# Patient Record
Sex: FEMALE | Race: WHITE | NOT HISPANIC OR LATINO | Employment: UNEMPLOYED | URBAN - METROPOLITAN AREA
[De-identification: names, ages, dates, MRNs, and addresses within clinical notes are randomized per-mention and may not be internally consistent; named-entity substitution may affect disease eponyms.]

---
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
## 2022-01-13 ENCOUNTER — ROUTINE PRENATAL (OUTPATIENT)
Dept: OBSTETRICS AND GYNECOLOGY | Facility: CLINIC | Age: 22
End: 2022-01-13

## 2022-01-13 VITALS — SYSTOLIC BLOOD PRESSURE: 128 MMHG | BODY MASS INDEX: 22.86 KG/M2 | DIASTOLIC BLOOD PRESSURE: 80 MMHG | WEIGHT: 121 LBS

## 2022-01-13 DIAGNOSIS — Z34.80 INTRAUTERINE PREGNANCY IN TEENAGER: ICD-10-CM

## 2022-01-13 DIAGNOSIS — A59.01 TRICHOMONAL VAGINITIS: ICD-10-CM

## 2022-01-13 DIAGNOSIS — Z3A.16 16 WEEKS GESTATION OF PREGNANCY: ICD-10-CM

## 2022-01-13 DIAGNOSIS — N30.00 ACUTE CYSTITIS WITHOUT HEMATURIA: ICD-10-CM

## 2022-01-13 DIAGNOSIS — O99.820 GBS (GROUP B STREPTOCOCCUS CARRIER), +RV CULTURE, CURRENTLY PREGNANT: ICD-10-CM

## 2022-01-13 LAB
GLUCOSE UR STRIP-MCNC: NEGATIVE MG/DL
PROT UR STRIP-MCNC: NEGATIVE MG/DL

## 2022-01-13 PROCEDURE — 99213 OFFICE O/P EST LOW 20 MIN: CPT | Performed by: OBSTETRICS & GYNECOLOGY

## 2022-01-13 RX ORDER — PRENATAL VIT NO.126/IRON/FOLIC 28MG-0.8MG
1 TABLET ORAL DAILY
COMMUNITY
End: 2022-08-19

## 2022-01-13 NOTE — PROGRESS NOTES
OB FOLLOW UP  CC- Here for care of pregnancy        Kristel Fernandez is a 21 y.o.  16w3d patient being seen today for her obstetrical follow up visit. Patient reports no complaints    Her prenatal care is complicated by (and status) :    Patient Active Problem List   Diagnosis   • Pregnancy   • Intrauterine pregnancy in teenager   • Trichomonal vaginitis   • Acute cystitis   • GBS (group B Streptococcus carrier), +RV culture, currently pregnant       Flu Status: Declines  Ultrasound Today: No.    ROS -   Patient Reports : No Problems  Patient Denies: Loss of Fluid, Vaginal Spotting, Vision Changes and Headaches  Fetal Movement : fells flutters  All other systems reviewed and are negative.       The additional following portions of the patient's history were reviewed and updated as appropriate: allergies and current medications.    I have reviewed and agree with the HPI, ROS, and historical information as entered above. Kurt Corea MD    /80   Wt 54.9 kg (121 lb)   LMP 10/24/2021   BMI 22.86 kg/m²       EXAM:     FHT: 155 BPM   Uterine Size: size equals dates  Pelvic Exam: No    Urine glucose/protein: See prenatal flowsheet       Assessment and Plan    Problem List Items Addressed This Visit            Pregnancy    Overview     Dates by 9 1/7 wk u/s  Cell free DNA screen low risk; consistent with female fetus.         Relevant Orders    POC Urinalysis Dipstick (Completed)    Alpha Fetoprotein, Maternal    US Ob 14 + Weeks Single or First Gestation    Intrauterine pregnancy in teenager    Overview     22 yo.          Relevant Orders    US Ob 14 + Weeks Single or First Gestation    Trichomonal vaginitis    Overview     At prenatal visit.   Rx Flagy; treat partner.          Acute cystitis    Overview     GBS on prenatal Urine culture.   Rx amoxicillin.     Went to urgent treatment center and treated with Macrobid and 10 on 2021.  Can discontinue Macrodantin         GBS (group B  Streptococcus carrier), +RV culture, currently pregnant    Overview     Prenatal urine culture positive;   Treat in labor.                1. Pregnancy at 16w3d; AFP screen today.   2. Anom scan next visit.   3. Fetal status reassuring.   4. Activity and Exercise discussed.  Return in about 4 weeks (around 2/10/2022) for Anomaly scan ultrasound.    Kurt Corea MD  01/13/2022

## 2022-01-15 LAB
AFP ADJ MOM SERPL: 1.14
AFP INTERP SERPL-IMP: NORMAL
AFP INTERP SERPL-IMP: NORMAL
AFP SERPL-MCNC: 51.4 NG/ML
AGE AT DELIVERY: 20.1 YR
GA METHOD: NORMAL
GA: 16.4 WEEKS
IDDM PATIENT QL: NO
LABORATORY COMMENT REPORT: NORMAL
MULTIPLE PREGNANCY: NO
NEURAL TUBE DEFECT RISK FETUS: NORMAL %
RESULT: NORMAL

## 2022-01-31 ENCOUNTER — TELEPHONE (OUTPATIENT)
Dept: LABOR AND DELIVERY | Facility: HOSPITAL | Age: 22
End: 2022-01-31

## 2022-01-31 NOTE — TELEPHONE ENCOUNTER
1st attempt to contact Kristel to introduce [employer] program.  Unable to leave message.

## 2022-02-02 ENCOUNTER — TELEPHONE (OUTPATIENT)
Dept: LABOR AND DELIVERY | Facility: HOSPITAL | Age: 22
End: 2022-02-02

## 2022-02-02 NOTE — TELEPHONE ENCOUNTER
"2nd attempt to contact Kristel to introduce [employer] program.  Number is \"not reachable\", unable to leave message.  "

## 2022-02-10 ENCOUNTER — ROUTINE PRENATAL (OUTPATIENT)
Dept: OBSTETRICS AND GYNECOLOGY | Facility: CLINIC | Age: 22
End: 2022-02-10

## 2022-02-10 VITALS — DIASTOLIC BLOOD PRESSURE: 74 MMHG | WEIGHT: 124.6 LBS | SYSTOLIC BLOOD PRESSURE: 131 MMHG | BODY MASS INDEX: 23.54 KG/M2

## 2022-02-10 DIAGNOSIS — Z3A.20 20 WEEKS GESTATION OF PREGNANCY: Primary | ICD-10-CM

## 2022-02-10 DIAGNOSIS — Z3A.16 16 WEEKS GESTATION OF PREGNANCY: ICD-10-CM

## 2022-02-10 DIAGNOSIS — O35.EXX0 FETAL RENAL ANOMALY, SINGLE GESTATION: ICD-10-CM

## 2022-02-10 DIAGNOSIS — Z34.80 INTRAUTERINE PREGNANCY IN TEENAGER: ICD-10-CM

## 2022-02-10 DIAGNOSIS — N30.00 ACUTE CYSTITIS WITHOUT HEMATURIA: ICD-10-CM

## 2022-02-10 DIAGNOSIS — A59.01 TRICHOMONAL VAGINITIS: ICD-10-CM

## 2022-02-10 DIAGNOSIS — O99.820 GBS (GROUP B STREPTOCOCCUS CARRIER), +RV CULTURE, CURRENTLY PREGNANT: ICD-10-CM

## 2022-02-10 DIAGNOSIS — R10.30 LOWER ABDOMINAL PAIN: ICD-10-CM

## 2022-02-10 LAB
EXPIRATION DATE: 1
GLUCOSE UR STRIP-MCNC: NEGATIVE MG/DL
Lab: 1
PROT UR STRIP-MCNC: NEGATIVE MG/DL

## 2022-02-10 PROCEDURE — 99214 OFFICE O/P EST MOD 30 MIN: CPT | Performed by: OBSTETRICS & GYNECOLOGY

## 2022-02-10 NOTE — PROGRESS NOTES
OB FOLLOW UP  CC- Here for care of pregnancy        Kristel Fernandez is a 21 y.o.  20w3d patient being seen today for her obstetrical follow up visit. Patient reports low pelvic pain radiating into her lower back for 3 days. She states that in this 3 day period, she didn't feel baby move much, but she started feeling small movements today. Patient reports that she went to  ER today due to pain and they told her she had a yeast infection and prescribed her some Diflucan.     Her prenatal care is complicated by (and status) :    Patient Active Problem List   Diagnosis   • Pregnancy   • Intrauterine pregnancy in teenager   • Trichomonal vaginitis   • Acute cystitis   • GBS (group B Streptococcus carrier), +RV culture, currently pregnant   • Lower abdominal pain   • Fetal renal anomaly, single gestation       Flu Status: Declines  Ultrasound Today: Yes.  Findings showed viable IUP; anomaly scan with multicystic right kidney. .  I have personally evaluated the U/S and agree with the findings.     ROS -   Patient Reports : Headaches  Patient Denies: Loss of Fluid, Vaginal Spotting, Vision Changes, Nausea , Vomiting , Contractions and Epigastric pain  Fetal Movement : normal  All other systems reviewed and are negative.       The additional following portions of the patient's history were reviewed and updated as appropriate: allergies and current medications.    I have reviewed and agree with the HPI, ROS, and historical information as entered above. Kurt Corea MD    /74   Wt 56.5 kg (124 lb 9.6 oz)   LMP 10/24/2021   BMI 23.54 kg/m²       EXAM:     Abdomen: soft, non-tender; no masses  no umbilical or inguinal hernias are present  no hepato-splenomegaly  fundus firm and non-tender  bilateral lower abdominal tenderness without guarding or rebound.      FHT: 157 BPM   Uterine Size: size equals dates  Pelvic Exam: No    Urine glucose/protein: See prenatal flowsheet       Assessment and Plan    Problem  List Items Addressed This Visit            Pregnancy - Primary    Overview     Dates by 9 1/7 wk u/s  Cell free DNA screen low risk; consistent with female fetus.  aFP screen was negative.         Relevant Orders    POC Glucose, Urine, Qualitative, Dipstick    POC Protein, Urine, Qualitative, Dipstick    Intrauterine pregnancy in teenager    Overview     22 yo.          Trichomonal vaginitis    Overview     At prenatal visit.   Rx Flagy; treat partner.          Acute cystitis    Overview     GBS on prenatal Urine culture.   Rx amoxicillin.     Went to urgent treatment center and treated with Macrobid and 10 on 2021.  Can discontinue Macrodantin         GBS (group B Streptococcus carrier), +RV culture, currently pregnant    Overview     Prenatal urine culture positive;   Treat in labor.          Lower abdominal pain    Overview     2/10/22; Pt c/o diffuse lower abdominal pain and tenderness. No fever chills; Has had prior appendectomy. Cx length normal; Had negative CCu/a at [facility] this am.     Probable round ligament pain.   Try heat and Tylenol.          Fetal renal anomaly, single gestation    Overview     Anomaly scan 2/10/22; multicystic right kidney;     PDC consultation ordered.          Relevant Orders    [facility]          1. Pregnancy at 20w3d;  2. Anomaly scan with multicystic right fetal kidney; PDC evaluation scheduled.   3. Lower abdominal pain; went to [facility] ER today. Told had vaginal yeast infection.   4. Fetal status reassuring.   5. Activity and Exercise discussed.  Return in about 4 weeks (around 3/10/2022).    Kurt Corea MD  02/10/2022

## 2022-02-28 ENCOUNTER — APPOINTMENT (OUTPATIENT)
Dept: WOMENS IMAGING | Facility: HOSPITAL | Age: 22
End: 2022-02-28

## 2022-03-04 ENCOUNTER — OFFICE VISIT (OUTPATIENT)
Dept: OBSTETRICS AND GYNECOLOGY | Facility: HOSPITAL | Age: 22
End: 2022-03-04

## 2022-03-04 ENCOUNTER — HOSPITAL ENCOUNTER (OUTPATIENT)
Dept: WOMENS IMAGING | Facility: HOSPITAL | Age: 22
Discharge: HOME | End: 2022-03-04
Admitting: OBSTETRICS & GYNECOLOGY

## 2022-03-04 VITALS
HEIGHT: 59 IN | WEIGHT: 129 LBS | BODY MASS INDEX: 26 KG/M2 | DIASTOLIC BLOOD PRESSURE: 81 MMHG | SYSTOLIC BLOOD PRESSURE: 129 MMHG

## 2022-03-04 DIAGNOSIS — O35.EXX0 FETAL RENAL ANOMALY, SINGLE GESTATION: Primary | ICD-10-CM

## 2022-03-04 DIAGNOSIS — O35.EXX0 FETAL RENAL ANOMALY, SINGLE GESTATION: ICD-10-CM

## 2022-03-04 DIAGNOSIS — Z34.90 PREGNANCY, UNSPECIFIED GESTATIONAL AGE: ICD-10-CM

## 2022-03-04 PROCEDURE — 99213 OFFICE O/P EST LOW 20 MIN: CPT | Performed by: OBSTETRICS & GYNECOLOGY

## 2022-03-04 PROCEDURE — 76811 OB US DETAILED SNGL FETUS: CPT

## 2022-03-04 PROCEDURE — 76811 OB US DETAILED SNGL FETUS: CPT | Performed by: OBSTETRICS & GYNECOLOGY

## 2022-03-04 RX ORDER — POLYETHYLENE GLYCOL 3350 17 G/17G
POWDER, FOR SOLUTION ORAL TAKE AS DIRECTED
COMMUNITY
Start: 2022-02-11 | End: 2022-03-17

## 2022-03-04 RX ORDER — CYCLOBENZAPRINE HCL 5 MG
TABLET ORAL TAKE AS DIRECTED
COMMUNITY
Start: 2022-02-11 | End: 2022-03-17

## 2022-03-04 RX ORDER — DOCUSATE SODIUM 100 MG/1
CAPSULE, LIQUID FILLED ORAL TAKE AS DIRECTED
COMMUNITY
Start: 2022-02-11 | End: 2022-03-17

## 2022-03-04 RX ORDER — BISACODYL 10 MG
SUPPOSITORY, RECTAL RECTAL TAKE AS DIRECTED
COMMUNITY
Start: 2022-02-11 | End: 2022-03-17

## 2022-03-17 ENCOUNTER — ROUTINE PRENATAL (OUTPATIENT)
Dept: OBSTETRICS AND GYNECOLOGY | Facility: CLINIC | Age: 22
End: 2022-03-17

## 2022-03-17 VITALS — SYSTOLIC BLOOD PRESSURE: 102 MMHG | DIASTOLIC BLOOD PRESSURE: 70 MMHG | BODY MASS INDEX: 28.07 KG/M2 | WEIGHT: 139 LBS

## 2022-03-17 DIAGNOSIS — N30.00 ACUTE CYSTITIS WITHOUT HEMATURIA: ICD-10-CM

## 2022-03-17 DIAGNOSIS — O35.EXX0 FETAL RENAL ANOMALY, SINGLE GESTATION: ICD-10-CM

## 2022-03-17 DIAGNOSIS — A59.01 TRICHOMONAL VAGINITIS: ICD-10-CM

## 2022-03-17 DIAGNOSIS — R10.30 LOWER ABDOMINAL PAIN: ICD-10-CM

## 2022-03-17 DIAGNOSIS — Z34.80 INTRAUTERINE PREGNANCY IN TEENAGER: ICD-10-CM

## 2022-03-17 DIAGNOSIS — O99.820 GBS (GROUP B STREPTOCOCCUS CARRIER), +RV CULTURE, CURRENTLY PREGNANT: ICD-10-CM

## 2022-03-17 DIAGNOSIS — Z3A.25 25 WEEKS GESTATION OF PREGNANCY: Primary | ICD-10-CM

## 2022-03-17 LAB
GLUCOSE UR STRIP-MCNC: NEGATIVE MG/DL
PROT UR STRIP-MCNC: ABNORMAL MG/DL

## 2022-03-17 PROCEDURE — 99213 OFFICE O/P EST LOW 20 MIN: CPT | Performed by: OBSTETRICS & GYNECOLOGY

## 2022-03-17 NOTE — PROGRESS NOTES
OB FOLLOW UP  CC- Here for care of pregnancy        Kristel Fernandez is a 21 y.o.  25w3d patient being seen today for her obstetrical follow up visit. Patient reports no complaints.     Her prenatal care is complicated by (and status) :    Patient Active Problem List   Diagnosis   • Pregnancy   • Intrauterine pregnancy in teenager   • Trichomonal vaginitis   • Acute cystitis   • GBS (group B Streptococcus carrier), +RV culture, currently pregnant   • Lower abdominal pain   • Fetal renal anomaly, single gestation       Flu Status: Declines  Ultrasound Today: No.    ROS -   Patient Reports : No Problems  Patient Denies: Loss of Fluid, Vaginal Spotting, Vision Changes and Headaches  Fetal Movement : normal  All other systems reviewed and are negative.       The additional following portions of the patient's history were reviewed and updated as appropriate: allergies and current medications.    I have reviewed and agree with the HPI, ROS, and historical information as entered above. Kurt Corea MD    /70   Wt 63 kg (139 lb)   LMP 10/24/2021   BMI 28.07 kg/m²       EXAM:     FHT: 163 BPM   Uterine Size: size equals dates  Pelvic Exam: No    Urine glucose/protein: See prenatal flowsheet       Assessment and Plan    Problem List Items Addressed This Visit            Pregnancy - Primary    Overview     Dates by 9 1/7 wk u/s  Cell free DNA screen low risk; consistent with female fetus.  aFP screen was negative.           Relevant Orders    POC Urinalysis Dipstick (Completed)    Intrauterine pregnancy in teenager    Overview     20 yo.            Trichomonal vaginitis    Overview     At prenatal visit.   Rx Flagy; treat partner.            Acute cystitis    Overview     GBS on prenatal Urine culture.   Rx amoxicillin.     Went to urgent treatment center and treated with Macrobid and 10 on 2021.  Can discontinue Macrodantin           GBS (group B Streptococcus carrier), +RV culture, currently  pregnant    Overview     Prenatal urine culture positive;   Treat in labor.            Lower abdominal pain    Overview     2/10/22; Pt c/o diffuse lower abdominal pain and tenderness. No fever chills; Has had prior appendectomy. Cx length normal; Had negative CCu/a at  [facility] this am.     Probable round ligament pain.   Try heat and Tylenol.            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 Pregnancy at 25w3d; glucose screen next.   2. 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   3. Fetal status reassuring.   4. Activity and Exercise discussed.  Return in about 4 weeks (around 4/14/2022) for Next scheduled follow up, One hour Glucose Screen.    Kurt Corea MD  03/17/2022

## 2022-04-07 ENCOUNTER — ROUTINE PRENATAL (OUTPATIENT)
Dept: OBSTETRICS AND GYNECOLOGY | Facility: CLINIC | Age: 22
End: 2022-04-07

## 2022-04-07 VITALS — WEIGHT: 139 LBS | DIASTOLIC BLOOD PRESSURE: 78 MMHG | SYSTOLIC BLOOD PRESSURE: 100 MMHG | BODY MASS INDEX: 28.07 KG/M2

## 2022-04-07 DIAGNOSIS — R10.30 LOWER ABDOMINAL PAIN: ICD-10-CM

## 2022-04-07 DIAGNOSIS — Z3A.28 28 WEEKS GESTATION OF PREGNANCY: Primary | ICD-10-CM

## 2022-04-07 DIAGNOSIS — A59.01 TRICHOMONAL VAGINITIS: ICD-10-CM

## 2022-04-07 DIAGNOSIS — O99.820 GBS (GROUP B STREPTOCOCCUS CARRIER), +RV CULTURE, CURRENTLY PREGNANT: ICD-10-CM

## 2022-04-07 DIAGNOSIS — Z34.80 INTRAUTERINE PREGNANCY IN TEENAGER: ICD-10-CM

## 2022-04-07 DIAGNOSIS — N30.00 ACUTE CYSTITIS WITHOUT HEMATURIA: ICD-10-CM

## 2022-04-07 DIAGNOSIS — Z23 IMMUNIZATION DUE: ICD-10-CM

## 2022-04-07 DIAGNOSIS — O35.EXX0 FETAL RENAL ANOMALY, SINGLE GESTATION: ICD-10-CM

## 2022-04-07 LAB
GLUCOSE UR STRIP-MCNC: NEGATIVE MG/DL
PROT UR STRIP-MCNC: ABNORMAL MG/DL

## 2022-04-07 PROCEDURE — 99213 OFFICE O/P EST LOW 20 MIN: CPT | Performed by: OBSTETRICS & GYNECOLOGY

## 2022-04-07 NOTE — PROGRESS NOTES
OB FOLLOW UP  CC- Here for care of pregnancy        Kristel Fernandez is a 21 y.o.  28w3d patient being seen today for her obstetrical follow up. Patient reports round ligament pain when getting up patient denies headaches andblurred vision pt states sometimes gets nauseated  with no vomiting.     Patient undergoing Glucola testing today. She is due for her testing at 10:05.     MBT: O+  Rhogam Given: N/A  TDAP: given today  Breast Pump: prescription given  Flu Status: Declines  Ultrasound Today: No.    Her prenatal care is complicated by (and status) :    Patient Active Problem List   Diagnosis   • Pregnancy   • Intrauterine pregnancy in teenager   • Trichomonal vaginitis   • Acute cystitis   • GBS (group B Streptococcus carrier), +RV culture, currently pregnant   • Lower abdominal pain   • Fetal renal anomaly, single gestation         ROS -   Patient Reports : round ligament pain  Patient Denies: Loss of Fluid, Vaginal Spotting, Vision Changes, Headaches, Vomiting  and Contractions  Fetal Movement : normal    The additional following portions of the patient's history were reviewed and updated as appropriate: allergies and current medications.    I have reviewed and agree with the HPI, ROS, and historical information as entered above. Kurt Corea MD    /78   Wt 63 kg (139 lb)   LMP 10/24/2021   BMI 28.07 kg/m²         EXAM:     FHT: 153 BPM   Uterine Size: 27 cm  Pelvic Exam: No       Assessment and Plan    Problem List Items Addressed This Visit            Pregnancy - Primary    Overview     Dates by 9 1/7 wk u/s  Cell free DNA screen low risk; consistent with female fetus.  aFP screen was negative.           Relevant Orders    POC Urinalysis Dipstick (Completed)    Antibody Screen    CBC (No Diff)    Gestational Screen 1 Hr ([facility])    Intrauterine pregnancy in teenager    Overview     22 yo.            Relevant Orders    Antibody Screen    CBC (No Diff)    Gestational Screen 1 Hr  ([facility])    Trichomonal vaginitis    Overview     At prenatal visit.   Rx Flagy; treat partner.            Relevant Orders    Antibody Screen    CBC (No Diff)    Gestational Screen 1 Hr ([facility])    Acute cystitis    Overview     GBS on prenatal Urine culture.   Rx amoxicillin.     Went to urgent treatment center and treated with Macrobid and 10 on 12/6/2021.  Can discontinue Macrodantin           Relevant Orders    Antibody Screen    CBC (No Diff)    Gestational Screen 1 Hr ([facility])    GBS (group B Streptococcus carrier), +RV culture, currently pregnant    Overview     Prenatal urine culture positive;   Treat in labor.            Relevant Orders    Antibody Screen    CBC (No Diff)    Gestational Screen 1 Hr ([facility])    Lower abdominal pain    Overview     2/10/22; Pt c/o diffuse lower abdominal pain and tenderness. No fever chills; Has had prior appendectomy. Cx length normal; Had negative [facility]a [facility] this [facility].     Probable round ligament pain.   Try heat and Tylenol.            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 Pregnancy at 28w3d; one hour glucose screen today.   2. H/o dysplastic fetal kidney; Has PDC f/u us/ on 4/28/22.  3. Fetal status reassuring.   4. Activity and Exercise discussed.  Return in about 4 weeks (around 5/5/2022) for Next scheduled follow up.    Kurt Corea MD  04/07/2022

## 2022-04-08 LAB
BLD GP AB SCN SERPL QL: NEGATIVE
ERYTHROCYTE [DISTWIDTH] IN BLOOD BY AUTOMATED COUNT: 12.3 % (ref 12.3–15.4)
GLUCOSE 1H P 50 G GLC PO SERPL-MCNC: 112 MG/DL (ref 65–139)
HCT VFR BLD AUTO: 35.5 % (ref 34–46.6)
HGB BLD-MCNC: 12.2 G/DL (ref 12–15.9)
MCH RBC QN AUTO: 29.8 PG (ref 26.6–33)
MCHC RBC AUTO-ENTMCNC: 34.4 G/DL (ref 31.5–35.7)
MCV RBC AUTO: 86.6 FL (ref 79–97)
PLATELET # BLD AUTO: 200 10*3/MM3 (ref 140–450)
RBC # BLD AUTO: 4.1 10*6/MM3 (ref 3.77–5.28)
WBC # BLD AUTO: 7.62 10*3/MM3 (ref 3.4–10.8)

## 2022-04-11 ENCOUNTER — TELEPHONE (OUTPATIENT)
Dept: OBSTETRICS AND GYNECOLOGY | Facility: CLINIC | Age: 22
End: 2022-04-11

## 2022-04-11 NOTE — TELEPHONE ENCOUNTER
Pt called in stating that she is 29w0d and that her boyfriend called her and said that he has trichomonas. Pt instructed to come in to the office and leave a urine sample so that it can be sent out for STD testing. Pt stated understanding.

## 2022-04-14 ENCOUNTER — ROUTINE PRENATAL (OUTPATIENT)
Dept: OBSTETRICS AND GYNECOLOGY | Facility: CLINIC | Age: 22
End: 2022-04-14

## 2022-04-14 VITALS — WEIGHT: 138 LBS | DIASTOLIC BLOOD PRESSURE: 70 MMHG | SYSTOLIC BLOOD PRESSURE: 128 MMHG | BODY MASS INDEX: 27.87 KG/M2

## 2022-04-14 DIAGNOSIS — Z11.3 SCREENING EXAMINATION FOR STD (SEXUALLY TRANSMITTED DISEASE): ICD-10-CM

## 2022-04-14 DIAGNOSIS — O35.EXX0 FETAL RENAL ANOMALY, SINGLE GESTATION: ICD-10-CM

## 2022-04-14 DIAGNOSIS — O36.8130 DECREASED FETAL MOVEMENTS IN THIRD TRIMESTER, SINGLE OR UNSPECIFIED FETUS: ICD-10-CM

## 2022-04-14 DIAGNOSIS — O99.820 GBS (GROUP B STREPTOCOCCUS CARRIER), +RV CULTURE, CURRENTLY PREGNANT: ICD-10-CM

## 2022-04-14 DIAGNOSIS — Z34.80 INTRAUTERINE PREGNANCY IN TEENAGER: ICD-10-CM

## 2022-04-14 DIAGNOSIS — A59.01 TRICHOMONAL VAGINITIS: ICD-10-CM

## 2022-04-14 DIAGNOSIS — N89.8 VAGINAL DISCHARGE: ICD-10-CM

## 2022-04-14 DIAGNOSIS — O36.8120 DECREASED FETAL MOVEMENTS IN SECOND TRIMESTER, SINGLE OR UNSPECIFIED FETUS: ICD-10-CM

## 2022-04-14 DIAGNOSIS — Z3A.29 29 WEEKS GESTATION OF PREGNANCY: Primary | ICD-10-CM

## 2022-04-14 PROBLEM — K21.9 GERD WITHOUT ESOPHAGITIS: Status: ACTIVE | Noted: 2022-04-14

## 2022-04-14 LAB
EXPIRATION DATE: 0
GLUCOSE UR STRIP-MCNC: NEGATIVE MG/DL
Lab: 0
PROT UR STRIP-MCNC: ABNORMAL MG/DL
WET PREP GENITAL: ABNORMAL

## 2022-04-14 PROCEDURE — 87210 SMEAR WET MOUNT SALINE/INK: CPT | Performed by: OBSTETRICS & GYNECOLOGY

## 2022-04-14 PROCEDURE — 99213 OFFICE O/P EST LOW 20 MIN: CPT | Performed by: OBSTETRICS & GYNECOLOGY

## 2022-04-14 RX ORDER — METRONIDAZOLE 500 MG/1
500 TABLET ORAL 2 TIMES DAILY
Qty: 14 TABLET | Refills: 0 | Status: SHIPPED | OUTPATIENT
Start: 2022-04-14 | End: 2022-04-21

## 2022-04-14 RX ORDER — FAMOTIDINE 20 MG/1
20 TABLET, FILM COATED ORAL 2 TIMES DAILY
Qty: 60 TABLET | Refills: 6 | Status: SHIPPED | OUTPATIENT
Start: 2022-04-14 | End: 2022-06-23 | Stop reason: HOSPADM

## 2022-04-14 RX ORDER — BREAST PUMP
EACH MISCELLANEOUS
Qty: 1 EACH | Refills: 0 | Status: SHIPPED | OUTPATIENT
Start: 2022-04-14

## 2022-04-14 NOTE — PROGRESS NOTES
OB FOLLOW UP  CC- Here for care of pregnancy        Kristel Fernandez is a 21 y.o.  29w3d patient being seen today for her obstetrical follow up visit. Patient reports decreased fetal movement. Patient reports that she was told by her boyfriend this past Monday that he has trichomonas and she would like to be tested.     NST today for decreased fetal movement.     Her prenatal care is complicated by (and status) :    Patient Active Problem List   Diagnosis   • Pregnancy   • Intrauterine pregnancy in teenager   • Trichomonal vaginitis   • Acute cystitis   • GBS (group B Streptococcus carrier), +RV culture, currently pregnant   • Lower abdominal pain   • Fetal renal anomaly, single gestation   • Decreased fetal movements in third trimester   • Vaginal discharge   • GERD without esophagitis       Flu Status: Declines  Ultrasound Today: No.    ROS -   Patient Reports : Cramping  Patient Denies: Loss of Fluid, Vaginal Spotting, Vision Changes, Headaches, Nausea , Vomiting , Contractions and Epigastric pain  Fetal Movement : decreased  All other systems reviewed and are negative.       The additional following portions of the patient's history were reviewed and updated as appropriate: allergies and current medications.    I have reviewed and agree with the HPI, ROS, and historical information as entered above. Kurt Corea MD    /70   Wt 62.6 kg (138 lb)   LMP 10/24/2021   BMI 27.87 kg/m²       EXAM:     FHT: 150 BPM   Uterine Size: size equals dates  Pelvic Exam: Heavy yellow discharge.    Urine glucose/protein: See prenatal flowsheet       Assessment and Plan    Problem List Items Addressed This Visit            Pregnancy - Primary    Overview     Dates by 9 1/7 wk u/s  Cell free DNA screen low risk; consistent with female fetus.  aFP screen was negative.           Relevant Orders    POC Glucose, Urine, Qualitative, Dipstick (Completed)    POC Protein, Urine, Qualitative, Dipstick (Completed)     US Ob Follow Up Transabdominal Approach    US Fetal Biophysical Profile;With Non-Stress Testing    Intrauterine pregnancy in teenager    Overview     20 yo.            Trichomonal vaginitis    Overview     At prenatal visit.   Rx Flagy; treat partner.            GBS (group B Streptococcus carrier), +RV culture, currently pregnant    Overview     Prenatal urine culture positive;   Treat in labor.            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            Relevant Orders    POC Wet Prep (Completed)    Chlamydia trachomatis, Neisseria gonorrhoeae, Trichomonas vaginalis, PCR - Swab, Cervix      Other Visit Diagnoses     Decreased fetal movements in second trimester, single or unspecified fetus        Relevant Orders    Fetal Nonstress Test    US Ob Follow Up Transabdominal Approach    US Fetal Biophysical Profile;With Non-Stress Testing    Screening examination for STD (sexually transmitted disease)        Relevant Orders    Chlamydia trachomatis, Neisseria gonorrhoeae, Trichomonas vaginalis, PCR - Swab, Cervix          1. Pregnancy at 29w3d;  2. Complains of decreased fetal movement.  NST was reactive active fetal movement heard.  Biophysical profile 8 out of 8.   3. Vaginal discharge.  Boyfriend treated for trichomonas.  Wet mount positive for BV.  Rx Flagyl 500 mg twice daily for 7 days.  New swab sent.  4. Massive heartburn.  Rx Pepcid sent to pharmacy.  5. Fetal status reassuring.  Estimate fetal weight 75 percentile.  6. Activity and Exercise discussed.  Return in about 2 weeks (around 4/28/2022).    Kurt Corea MD  04/14/2022

## 2022-04-16 LAB
C TRACH RRNA SPEC QL NAA+PROBE: NEGATIVE
N GONORRHOEA RRNA SPEC QL NAA+PROBE: NEGATIVE
T VAGINALIS RRNA SPEC QL NAA+PROBE: NEGATIVE

## 2022-04-28 ENCOUNTER — HOSPITAL ENCOUNTER (OUTPATIENT)
Dept: WOMENS IMAGING | Facility: HOSPITAL | Age: 22
Discharge: HOME | End: 2022-04-28
Admitting: OBSTETRICS & GYNECOLOGY

## 2022-04-28 ENCOUNTER — OFFICE VISIT (OUTPATIENT)
Dept: OBSTETRICS AND GYNECOLOGY | Facility: HOSPITAL | Age: 22
End: 2022-04-28

## 2022-04-28 VITALS — WEIGHT: 138.8 LBS | DIASTOLIC BLOOD PRESSURE: 64 MMHG | SYSTOLIC BLOOD PRESSURE: 117 MMHG | BODY MASS INDEX: 28.03 KG/M2

## 2022-04-28 DIAGNOSIS — Z34.90 PREGNANCY, UNSPECIFIED GESTATIONAL AGE: ICD-10-CM

## 2022-04-28 DIAGNOSIS — O35.EXX0 FETAL RENAL ANOMALY, SINGLE GESTATION: Primary | ICD-10-CM

## 2022-04-28 DIAGNOSIS — O35.EXX0 FETAL RENAL ANOMALY, SINGLE GESTATION: ICD-10-CM

## 2022-04-28 PROCEDURE — 76816 OB US FOLLOW-UP PER FETUS: CPT | Performed by: OBSTETRICS & GYNECOLOGY

## 2022-04-28 PROCEDURE — 76816 OB US FOLLOW-UP PER FETUS: CPT

## 2022-04-28 NOTE — PROGRESS NOTES
Patient seen in Maternal Fetal Medicine clinic today. Please see full note in under imaging tab of patient chart in Epic (Viewpoint report).    Antonina Moss MD

## 2022-05-12 ENCOUNTER — ROUTINE PRENATAL (OUTPATIENT)
Dept: OBSTETRICS AND GYNECOLOGY | Facility: CLINIC | Age: 22
End: 2022-05-12

## 2022-05-12 VITALS — BODY MASS INDEX: 28.92 KG/M2 | SYSTOLIC BLOOD PRESSURE: 118 MMHG | WEIGHT: 143.2 LBS | DIASTOLIC BLOOD PRESSURE: 64 MMHG

## 2022-05-12 DIAGNOSIS — Z34.80 INTRAUTERINE PREGNANCY IN TEENAGER: ICD-10-CM

## 2022-05-12 DIAGNOSIS — Z34.90 PREGNANCY, UNSPECIFIED GESTATIONAL AGE: ICD-10-CM

## 2022-05-12 DIAGNOSIS — O35.EXX0 FETAL RENAL ANOMALY, SINGLE GESTATION: Primary | ICD-10-CM

## 2022-05-12 DIAGNOSIS — O99.820 GBS (GROUP B STREPTOCOCCUS CARRIER), +RV CULTURE, CURRENTLY PREGNANT: ICD-10-CM

## 2022-05-12 DIAGNOSIS — K21.9 GERD WITHOUT ESOPHAGITIS: ICD-10-CM

## 2022-05-12 PROCEDURE — 99213 OFFICE O/P EST LOW 20 MIN: CPT | Performed by: OBSTETRICS & GYNECOLOGY

## 2022-05-12 NOTE — PROGRESS NOTES
OB FOLLOW UP  CC- Here for care of pregnancy        Kristel Fernandez is a 21 y.o.  33w3d patient being seen today for her obstetrical follow up visit. Patient reports no complaints. Pt states that she needs a refill on her flagyl because she lost her medicine.    Her prenatal care is complicated by (and status) :    Patient Active Problem List   Diagnosis   • Pregnancy   • Intrauterine pregnancy in teenager   • Trichomonal vaginitis   • Acute cystitis   • GBS (group B Streptococcus carrier), +RV culture, currently pregnant   • Lower abdominal pain   • Fetal renal anomaly, single gestation   • Decreased fetal movements in third trimester   • Vaginal discharge   • GERD without esophagitis         Ultrasound Today: No.    ROS -   Patient Reports : No Problems  Patient Denies: Loss of Fluid, Vaginal Spotting, Vision Changes, Headaches, Nausea , Vomiting , Contractions and Epigastric pain  Fetal Movement : normal  All other systems reviewed and are negative.       The additional following portions of the patient's history were reviewed and updated as appropriate: allergies and current medications.    I have reviewed and agree with the HPI, ROS, and historical information as entered above. Kurt Corea MD    /64   Wt 65 kg (143 lb 3.2 oz)   LMP 10/24/2021   BMI 28.92 kg/m²       EXAM:     FHT: 155 BPM   Uterine Size: 32 cm  Pelvic Exam: No    Urine glucose/protein: See prenatal flowsheet       Assessment and Plan    Problem List Items Addressed This Visit            Pregnancy    Overview     Dates by 9 1/7 wk u/s  Cell free DNA screen low risk; consistent with female fetus.  aFP screen was negative.  Unilateral Multicystic dysplastic kidney.            Intrauterine pregnancy in teenager    Overview     22 yo.            Relevant Orders    US Ob Follow Up Transabdominal Approach    US Fetal Biophysical Profile;Without Non-Stress Testing    GBS (group B Streptococcus carrier), +RV culture,  currently pregnant    Overview     Prenatal urine culture positive;   Treat in labor.            Fetal renal anomaly, single gestation - Primary    Overview     Anomaly scan 2/10/22; multicystic right kidney;     PDC u/s c/w right sided unilateral multicystic dysplastic kidney> f/u 4/28 PDC;    PDC 4/28/22;           Relevant Orders    US Ob Follow Up Transabdominal Approach    US Fetal Biophysical Profile;Without Non-Stress Testing    GERD without esophagitis    Overview     Rx Pepcid 20 mg po bid.            Relevant Medications    famotidine (Pepcid) 20 MG tablet          1. Pregnancy at 33w3d; no problems today.  2. Right-sided unilateral dysplastic kidney.  Ultrasound 4/28/2022 at [facility] showed normal fetal growth at 64 percentile and stable fetal dysplastic kidney measuring 7 x 5 cm.  3. Fetal status reassuring.  Agus ultrasound for growth in 2 weeks.  4. Activity and Exercise discussed.  Return in about 2 weeks (around 5/26/2022) for Follow-up ultrasound, Next scheduled follow up.    Kurt Corea MD  05/12/2022

## 2022-05-26 ENCOUNTER — ROUTINE PRENATAL (OUTPATIENT)
Dept: OBSTETRICS AND GYNECOLOGY | Facility: CLINIC | Age: 22
End: 2022-05-26

## 2022-05-26 VITALS — DIASTOLIC BLOOD PRESSURE: 80 MMHG | WEIGHT: 148 LBS | SYSTOLIC BLOOD PRESSURE: 110 MMHG | BODY MASS INDEX: 29.89 KG/M2

## 2022-05-26 DIAGNOSIS — Z3A.35 35 WEEKS GESTATION OF PREGNANCY: ICD-10-CM

## 2022-05-26 DIAGNOSIS — K21.9 GERD WITHOUT ESOPHAGITIS: ICD-10-CM

## 2022-05-26 DIAGNOSIS — Z34.90 PREGNANCY, UNSPECIFIED GESTATIONAL AGE: ICD-10-CM

## 2022-05-26 DIAGNOSIS — N30.00 ACUTE CYSTITIS WITHOUT HEMATURIA: ICD-10-CM

## 2022-05-26 DIAGNOSIS — A59.01 TRICHOMONAL VAGINITIS: ICD-10-CM

## 2022-05-26 DIAGNOSIS — Z34.80 INTRAUTERINE PREGNANCY IN TEENAGER: ICD-10-CM

## 2022-05-26 DIAGNOSIS — N89.8 VAGINAL DISCHARGE: ICD-10-CM

## 2022-05-26 DIAGNOSIS — R10.30 LOWER ABDOMINAL PAIN: ICD-10-CM

## 2022-05-26 DIAGNOSIS — O36.8131 DECREASED FETAL MOVEMENTS IN THIRD TRIMESTER, FETUS 1 OF MULTIPLE GESTATION: Primary | ICD-10-CM

## 2022-05-26 DIAGNOSIS — O99.820 GBS (GROUP B STREPTOCOCCUS CARRIER), +RV CULTURE, CURRENTLY PREGNANT: ICD-10-CM

## 2022-05-26 DIAGNOSIS — O35.EXX0 FETAL RENAL ANOMALY, SINGLE GESTATION: ICD-10-CM

## 2022-05-26 LAB
GLUCOSE UR STRIP-MCNC: NEGATIVE MG/DL
PROT UR STRIP-MCNC: NEGATIVE MG/DL

## 2022-05-26 PROCEDURE — 99214 OFFICE O/P EST MOD 30 MIN: CPT | Performed by: OBSTETRICS & GYNECOLOGY

## 2022-05-26 RX ORDER — ACETAMINOPHEN 500 MG
500 TABLET ORAL EVERY 6 HOURS PRN
COMMUNITY

## 2022-05-26 RX ORDER — METRONIDAZOLE 500 MG/1
500 TABLET ORAL 2 TIMES DAILY
Qty: 14 TABLET | Refills: 0 | Status: SHIPPED | OUTPATIENT
Start: 2022-05-26 | End: 2022-06-02

## 2022-05-26 NOTE — PROGRESS NOTES
OB FOLLOW UP  CC- Here for care of pregnancy        Kristel Fernandez is a 22 y.o.  35w3d patient being seen today for her obstetrical follow up visit. Patient reports shady al.     Her prenatal care is complicated by (and status) :    Patient Active Problem List   Diagnosis   • Pregnancy   • Intrauterine pregnancy in teenager   • Trichomonal vaginitis   • Acute cystitis   • GBS (group B Streptococcus carrier), +RV culture, currently pregnant   • Lower abdominal pain   • Fetal renal anomaly, single gestation   • Decreased fetal movements in third trimester   • Vaginal discharge   • GERD without esophagitis       Flu Status: Declines  Ultrasound Today: No.    ROS -   Patient Reports : Contractions  Patient Denies: Loss of Fluid, Vaginal Spotting, Vision Changes, Headaches, Nausea , Vomiting  and Contractions  Fetal Movement : normal  All other systems reviewed and are negative.       The additional following portions of the patient's history were reviewed and updated as appropriate: allergies, current medications, past family history, past medical history, past social history, past surgical history and problem list.    I have reviewed and agree with the HPI, ROS, and historical information as entered above. Kurt Corea MD    /80   Wt 67.1 kg (148 lb)   LMP 10/24/2021   BMI 29.89 kg/m²       EXAM:     FHT: 158 BPM   Uterine Size: 33 cm  Pelvic Exam: No    Urine glucose/protein: See prenatal flowsheet       Assessment and Plan    Problem List Items Addressed This Visit            Pregnancy    Overview     Dates by 9 1/7 wk u/s  Cell free DNA screen low risk; consistent with female fetus.  aFP screen was negative.  Unilateral Multicystic dysplastic kidney.            Relevant Orders    POC Urinalysis Dipstick    Intrauterine pregnancy in teenager    Overview     20 yo.            Trichomonal vaginitis    Overview     At prenatal visit.   Rx Flagy; treat partner.            Acute cystitis     Overview     GBS on prenatal Urine culture.   Rx amoxicillin.     Went to urgent treatment center and treated with Macrobid and 10 on 12/6/2021.  Can discontinue Macrodantin           GBS (group B Streptococcus carrier), +RV culture, currently pregnant    Overview     Prenatal urine culture positive;   Treat in labor.            Lower abdominal pain    Overview     2/10/22; Pt c/o diffuse lower abdominal pain and tenderness. No fever chills; Has had prior appendectomy. Cx length normal; Had negative [facility]a at  [facility] this am.     Probable round ligament pain.   Try heat and Tylenol.            Fetal renal anomaly, single gestation    Overview     Anomaly scan 2/10/22; multicystic right kidney;     PDC u/s c/w right sided unilateral multicystic dysplastic kidney> f/u 4/28 PDC;    Ultrasound 4/28/2022 at [facility] showed normal fetal growth at 64 percentile and stable fetal dysplastic kidney measuring 7 x 5 cm.           Relevant Orders    US OB Follow Up Transabdominal Approach    Decreased fetal movements in third trimester - Primary    Overview     4/14/22 U/s and BPP           Vaginal discharge    Overview     4/14/22; Wet mount positive for BV; Many WBCs  New swab sent.            GERD without esophagitis    Overview     Rx Pepcid 20 mg po bid.            Relevant Medications    famotidine (Pepcid) 20 MG tablet          1. Pregnancy at 35w3d; breech presentation.  2. H/o right sided unilateral multicystic dysplastic kidney> last u/s 4/28 PDC;7 x 5 cm; measuring 8 cm today.  3. Estimated fetal weight 77 percentile on ultrasound today.  AC 95%.  4. Options for breech presentation reviewed.  May not be a good candidate for external cephalic version with decreased fluid and large baby.  5. Fetal status reassuring.  LOLI 7.9 today.  Encouraged hydration and left-sided rest.  Repeat BPP and LOLI next week.  6. Activity and Exercise discussed.  Return in about 2 weeks (around 6/9/2022) for Next scheduled follow up.    Kurt  Karri Corea MD  05/26/2022

## 2022-06-09 ENCOUNTER — ROUTINE PRENATAL (OUTPATIENT)
Dept: OBSTETRICS AND GYNECOLOGY | Facility: CLINIC | Age: 22
End: 2022-06-09

## 2022-06-09 VITALS — DIASTOLIC BLOOD PRESSURE: 84 MMHG | BODY MASS INDEX: 29.77 KG/M2 | SYSTOLIC BLOOD PRESSURE: 128 MMHG | WEIGHT: 147.4 LBS

## 2022-06-09 DIAGNOSIS — Z34.80 INTRAUTERINE PREGNANCY IN TEENAGER: Primary | ICD-10-CM

## 2022-06-09 DIAGNOSIS — O35.EXX0 FETAL RENAL ANOMALY, SINGLE GESTATION: ICD-10-CM

## 2022-06-09 DIAGNOSIS — K21.9 GERD WITHOUT ESOPHAGITIS: ICD-10-CM

## 2022-06-09 DIAGNOSIS — Z34.90 PREGNANCY, UNSPECIFIED GESTATIONAL AGE: ICD-10-CM

## 2022-06-09 DIAGNOSIS — O99.820 GBS (GROUP B STREPTOCOCCUS CARRIER), +RV CULTURE, CURRENTLY PREGNANT: ICD-10-CM

## 2022-06-09 DIAGNOSIS — Z3A.37 37 WEEKS GESTATION OF PREGNANCY: ICD-10-CM

## 2022-06-09 LAB
EXPIRATION DATE: 0
GLUCOSE UR STRIP-MCNC: NEGATIVE MG/DL
Lab: 0
PROT UR STRIP-MCNC: NEGATIVE MG/DL

## 2022-06-09 PROCEDURE — 90471 IMMUNIZATION ADMIN: CPT | Performed by: OBSTETRICS & GYNECOLOGY

## 2022-06-09 PROCEDURE — 99214 OFFICE O/P EST MOD 30 MIN: CPT | Performed by: OBSTETRICS & GYNECOLOGY

## 2022-06-09 PROCEDURE — 90715 TDAP VACCINE 7 YRS/> IM: CPT | Performed by: OBSTETRICS & GYNECOLOGY

## 2022-06-09 RX ORDER — METRONIDAZOLE 500 MG/1
500 TABLET ORAL 2 TIMES DAILY
COMMUNITY
End: 2022-06-17

## 2022-06-09 NOTE — PROGRESS NOTES
OB FOLLOW UP  CC- Here for care of pregnancy        Kristel Fernandez is a 22 y.o.  37w3d patient being seen today for her obstetrical follow up visit. Patient reports heartburn. Patient also reports some generalized lower abdominal cramping.     Her prenatal care is complicated by (and status) :    Patient Active Problem List   Diagnosis   • Pregnancy   • Intrauterine pregnancy in teenager   • Trichomonal vaginitis   • Acute cystitis   • GBS (group B Streptococcus carrier), +RV culture, currently pregnant   • Lower abdominal pain   • Fetal renal anomaly, single gestation   • Decreased fetal movements in third trimester   • Vaginal discharge   • GERD without esophagitis   • Breech presentation     GBS Status: was positive in urine.  Her Delivery Plan is: Primary . Needs to be scheduled  Ultrasound Today: Yes.  Findings showed viable IUP persistent breech presentation.  Fetal right dysplastic kidney measuring 8 x 5 x 6 cm.  LOLI was 7.3.  Biophysical 8 out of 8..  I have personally evaluated the U/S and agree with the findings.     ROS -   Patient Reports : Cramping  Patient Denies: Loss of Fluid, Vaginal Spotting, Vision Changes, Headaches, Nausea , Vomiting , Contractions and Epigastric pain  Fetal Movement : normal  All other systems reviewed and are negative.       The additional following portions of the patient's history were reviewed and updated as appropriate: allergies and current medications.    I have reviewed and agree with the HPI, ROS, and historical information as entered above. Kurt Corea MD        /84   Wt 66.9 kg (147 lb 6.4 oz)   LMP 10/24/2021   BMI 29.77 kg/m²     EXAM:     FHT: 169 BPM   Uterine Size: not examined.  Pelvic Exam: No    Urine glucose/protein: See prenatal flowsheet       Assessment and Plan    Problem List Items Addressed This Visit            Pregnancy    Overview     Dates by 9 1/7 wk u/s  Cell free DNA screen low risk; consistent with female  fetus.  aFP screen was negative.  Unilateral Multicystic dysplastic kidney.            Relevant Orders    POC Glucose, Urine, Qualitative, Dipstick (Completed)    POC Protein, Urine, Qualitative, Dipstick (Completed)    Intrauterine pregnancy in teenager - Primary    Overview     20 yo.            GBS (group B Streptococcus carrier), +RV culture, currently pregnant    Overview     Prenatal urine culture positive;   Treat in labor.            Fetal renal anomaly, single gestation    Overview     Anomaly scan 2/10/22; multicystic right kidney;     PDC u/s c/w right sided unilateral multicystic dysplastic kidney> f/u  PDC;    Ultrasound 2022 at [facility] showed normal fetal growth at 64 percentile and stable fetal dysplastic kidney measuring 7 x 5 cm.           Relevant Orders    US Fetal Biophysical Profile;Without Non-Stress Testing    GERD without esophagitis    Overview     Rx Pepcid 20 mg po bid.            Relevant Medications    famotidine (Pepcid) 20 MG tablet    Breech presentation    Overview     22; U/s Breech; EFW 77%; LOLI 7.9           Relevant Orders    US Fetal Biophysical Profile;Without Non-Stress Testing          1. Pregnancy at 37w3d;  U/s today;viable IUP persistent breech presentation.  Fetal right dysplastic kidney measuring 8 x 5 x 6 cm.  LOLI was 7.3.  Biophysical 8 out of 8.  2. Persistent breech presentation.  Options reviewed decision made to proceed with delivery by primary  section.  3. Right dysplastic kidney.  Now measuring 8 x 5 x 6 cm.  Low normal LOLI.  Continue oral hydration and left-sided rest.  Repeat LOLI and biophysical profile weekly.  4. Fetal status reassuring.   5. Activity and Exercise discussed.  Return in about 1 week (around 2022) for Weekly BPP.    Kurt Corea MD  2022

## 2022-06-13 ENCOUNTER — TELEPHONE (OUTPATIENT)
Dept: OBSTETRICS AND GYNECOLOGY | Facility: CLINIC | Age: 22
End: 2022-06-13

## 2022-06-16 ENCOUNTER — ROUTINE PRENATAL (OUTPATIENT)
Dept: OBSTETRICS AND GYNECOLOGY | Facility: CLINIC | Age: 22
End: 2022-06-16

## 2022-06-16 ENCOUNTER — PREP FOR SURGERY (OUTPATIENT)
Dept: OTHER | Facility: HOSPITAL | Age: 22
End: 2022-06-16

## 2022-06-16 VITALS — WEIGHT: 149.6 LBS | DIASTOLIC BLOOD PRESSURE: 64 MMHG | SYSTOLIC BLOOD PRESSURE: 116 MMHG | BODY MASS INDEX: 30.22 KG/M2

## 2022-06-16 DIAGNOSIS — Z3A.38 38 WEEKS GESTATION OF PREGNANCY: Primary | ICD-10-CM

## 2022-06-16 DIAGNOSIS — N89.8 VAGINAL DISCHARGE: ICD-10-CM

## 2022-06-16 DIAGNOSIS — O35.EXX0 FETAL RENAL ANOMALY, SINGLE GESTATION: ICD-10-CM

## 2022-06-16 DIAGNOSIS — Z3A.39 39 WEEKS GESTATION OF PREGNANCY: Primary | ICD-10-CM

## 2022-06-16 DIAGNOSIS — Z34.80 INTRAUTERINE PREGNANCY IN TEENAGER: ICD-10-CM

## 2022-06-16 DIAGNOSIS — A59.01 TRICHOMONAL VAGINITIS: ICD-10-CM

## 2022-06-16 DIAGNOSIS — O99.820 GBS (GROUP B STREPTOCOCCUS CARRIER), +RV CULTURE, CURRENTLY PREGNANT: ICD-10-CM

## 2022-06-16 DIAGNOSIS — K21.9 GERD WITHOUT ESOPHAGITIS: ICD-10-CM

## 2022-06-16 DIAGNOSIS — R10.30 LOWER ABDOMINAL PAIN: ICD-10-CM

## 2022-06-16 LAB
EXPIRATION DATE: NORMAL
EXPIRATION DATE: NORMAL
GLUCOSE UR STRIP-MCNC: NEGATIVE MG/DL
Lab: NORMAL
Lab: NORMAL
PROT UR STRIP-MCNC: NEGATIVE MG/DL

## 2022-06-16 PROCEDURE — 99213 OFFICE O/P EST LOW 20 MIN: CPT | Performed by: OBSTETRICS & GYNECOLOGY

## 2022-06-16 RX ORDER — LIDOCAINE HYDROCHLORIDE 10 MG/ML
5 INJECTION, SOLUTION EPIDURAL; INFILTRATION; INTRACAUDAL; PERINEURAL AS NEEDED
Status: CANCELLED | OUTPATIENT
Start: 2022-06-16

## 2022-06-16 RX ORDER — TRISODIUM CITRATE DIHYDRATE AND CITRIC ACID MONOHYDRATE 500; 334 MG/5ML; MG/5ML
30 SOLUTION ORAL ONCE
Status: CANCELLED | OUTPATIENT
Start: 2022-06-16 | End: 2022-06-16

## 2022-06-16 RX ORDER — SODIUM CHLORIDE, SODIUM LACTATE, POTASSIUM CHLORIDE, CALCIUM CHLORIDE 600; 310; 30; 20 MG/100ML; MG/100ML; MG/100ML; MG/100ML
125 INJECTION, SOLUTION INTRAVENOUS CONTINUOUS
Status: CANCELLED | OUTPATIENT
Start: 2022-06-16

## 2022-06-16 RX ORDER — MISOPROSTOL 200 UG/1
800 TABLET ORAL AS NEEDED
Status: CANCELLED | OUTPATIENT
Start: 2022-06-16

## 2022-06-16 RX ORDER — ACETAMINOPHEN 500 MG
1000 TABLET ORAL ONCE
Status: CANCELLED | OUTPATIENT
Start: 2022-06-16 | End: 2022-06-16

## 2022-06-16 RX ORDER — OXYTOCIN/0.9 % SODIUM CHLORIDE 30/500 ML
650 PLASTIC BAG, INJECTION (ML) INTRAVENOUS ONCE
Status: CANCELLED | OUTPATIENT
Start: 2022-06-16

## 2022-06-16 RX ORDER — SODIUM CHLORIDE 0.9 % (FLUSH) 0.9 %
3 SYRINGE (ML) INJECTION EVERY 12 HOURS SCHEDULED
Status: CANCELLED | OUTPATIENT
Start: 2022-06-16

## 2022-06-16 RX ORDER — OXYTOCIN/0.9 % SODIUM CHLORIDE 30/500 ML
85 PLASTIC BAG, INJECTION (ML) INTRAVENOUS ONCE
Status: CANCELLED | OUTPATIENT
Start: 2022-06-16

## 2022-06-16 RX ORDER — CEFAZOLIN SODIUM 2 G/100ML
2 INJECTION, SOLUTION INTRAVENOUS ONCE
Status: CANCELLED | OUTPATIENT
Start: 2022-06-16 | End: 2022-06-16

## 2022-06-16 RX ORDER — CARBOPROST TROMETHAMINE 250 UG/ML
250 INJECTION, SOLUTION INTRAMUSCULAR AS NEEDED
Status: CANCELLED | OUTPATIENT
Start: 2022-06-16

## 2022-06-16 RX ORDER — METHYLERGONOVINE MALEATE 0.2 MG/ML
200 INJECTION INTRAVENOUS ONCE AS NEEDED
Status: CANCELLED | OUTPATIENT
Start: 2022-06-16

## 2022-06-16 RX ORDER — SODIUM CHLORIDE 0.9 % (FLUSH) 0.9 %
3-10 SYRINGE (ML) INJECTION AS NEEDED
Status: CANCELLED | OUTPATIENT
Start: 2022-06-16

## 2022-06-16 RX ORDER — KETOROLAC TROMETHAMINE 30 MG/ML
30 INJECTION, SOLUTION INTRAMUSCULAR; INTRAVENOUS ONCE
Status: CANCELLED | OUTPATIENT
Start: 2022-06-16 | End: 2022-06-16

## 2022-06-16 NOTE — PROGRESS NOTES
OB FOLLOW UP  CC- Here for care of pregnancy        Kristel Fernandez is a 22 y.o.  38w3d patient being seen today for her obstetrical follow up visit. Patient reports headache and cramps.     Her prenatal care is complicated by (and status) :    Patient Active Problem List   Diagnosis   • Pregnancy   • Intrauterine pregnancy in teenager   • Trichomonal vaginitis   • Acute cystitis   • GBS (group B Streptococcus carrier), +RV culture, currently pregnant   • Lower abdominal pain   • Fetal renal anomaly, single gestation   • Decreased fetal movements in third trimester   • Vaginal discharge   • GERD without esophagitis   • Breech presentation         GBS Status: N/A  Her Delivery Plan is: [facility] IOL at 39wks. Scheduled 2022   Ultrasound Today: Yes.  Findings showed viable IUP; breech; BPP 8/8; LOLI low normal 5.3.  I have personally evaluated the U/S and agree with the findings. Kurt Corea MD    ROS -   Patient Reports : Cramping  Patient Denies: Loss of Fluid, Vaginal Spotting, Vision Changes, Headaches, Nausea  and Vomiting   Fetal Movement : normal  All other systems reviewed and are negative.       The additional following portions of the patient's history were reviewed and updated as appropriate: allergies and current medications.    I have reviewed and agree with the HPI, ROS, and historical information as entered above. Kurt Corea MD        /64   Wt 67.9 kg (149 lb 9.6 oz)   LMP 10/24/2021   BMI 30.22 kg/m²     EXAM:     FHT: present u/s BPM   Uterine Size: NA  Pelvic Exam: No    Urine glucose/protein: See prenatal flowsheet       Assessment and Plan    Problem List Items Addressed This Visit            Pregnancy - Primary    Overview     Dates by 9 1/7 wk u/s  Cell free DNA screen low risk; consistent with female fetus.  aFP screen was negative.  Unilateral Multicystic dysplastic kidney.            Relevant Orders    POC Glucose, Urine, Qualitative, Dipstick  (Completed)    POC Protein, Urine, Qualitative, Dipstick (Completed)    Intrauterine pregnancy in teenager    Overview     20 yo.            Trichomonal vaginitis    Overview     At prenatal visit.   Rx Flagy; treat partner.            GBS (group B Streptococcus carrier), +RV culture, currently pregnant    Overview     Prenatal urine culture positive;   Treat in labor.            Lower abdominal pain    Overview     2/10/22; Pt c/o diffuse lower abdominal pain and tenderness. No fever chills; Has had prior appendectomy. Cx length normal; Had negative CCu/a at  ER this am.     Probable round ligament pain.   Try heat and Tylenol.            Fetal renal anomaly, single gestation    Overview     Anomaly scan 2/10/22; multicystic right kidney;     PDC u/s c/w right sided unilateral multicystic dysplastic kidney> f/u 4/28 PDC;    Ultrasound 4/28/2022 at [facility] showed normal fetal growth at 64 percentile and stable fetal dysplastic kidney measuring 7 x 5 cm.    U/s 6/9/22; right dysplastic kidney measures 8/x5x6 cm. LOLI 7.3    Repeat BPP weekly. Continue oral hydration.            Vaginal discharge    Overview     4/14/22; Wet mount positive for BV; Many WBCs  New swab sent.            GERD without esophagitis    Overview     Rx Pepcid 20 mg po bid.            Relevant Medications    famotidine (Pepcid) 20 MG tablet    Breech presentation    Overview     5/26/22; U/s Breech; EFW 77%; LOLI 7.9    6/9/22; Persistent Breech;  LOLI 7.3. Will schedule c section at 39 wk;                      1. Pregnancy at 38w3d; BPP 8/8; LOLI low normal 5.3.   2. Increase po fluids. Repeat LOLI in am. May need C section tomorrow if LOLI decreased.   3. Primary C section scheduled Monday 6/20/2022 at 9 AM; 6/17/2022 2:15 PM PAT   4. Fetal status reassuring.   5. Activity and Exercise discussed.  No follow-ups on file.    Kurt Corea MD  06/16/2022

## 2022-06-17 ENCOUNTER — ROUTINE PRENATAL (OUTPATIENT)
Dept: OBSTETRICS AND GYNECOLOGY | Facility: CLINIC | Age: 22
End: 2022-06-17

## 2022-06-17 ENCOUNTER — PRE-ADMISSION TESTING (OUTPATIENT)
Dept: PREADMISSION TESTING | Facility: HOSPITAL | Age: 22
End: 2022-06-17

## 2022-06-17 VITALS — BODY MASS INDEX: 30.01 KG/M2 | WEIGHT: 148.6 LBS | DIASTOLIC BLOOD PRESSURE: 66 MMHG | SYSTOLIC BLOOD PRESSURE: 118 MMHG

## 2022-06-17 VITALS — WEIGHT: 146.61 LBS | HEIGHT: 59 IN | BODY MASS INDEX: 29.56 KG/M2

## 2022-06-17 DIAGNOSIS — Z3A.38 38 WEEKS GESTATION OF PREGNANCY: Primary | ICD-10-CM

## 2022-06-17 DIAGNOSIS — Z3A.39 39 WEEKS GESTATION OF PREGNANCY: ICD-10-CM

## 2022-06-17 LAB
ABO GROUP BLD: NORMAL
ALP SERPL-CCNC: 151 U/L (ref 39–117)
ALT SERPL W P-5'-P-CCNC: 5 U/L (ref 1–33)
AST SERPL-CCNC: 16 U/L (ref 1–32)
BILIRUB SERPL-MCNC: 0.4 MG/DL (ref 0–1.2)
BLD GP AB SCN SERPL QL: NEGATIVE
CREAT SERPL-MCNC: 0.48 MG/DL (ref 0.57–1)
DEPRECATED RDW RBC AUTO: 41.1 FL (ref 37–54)
ERYTHROCYTE [DISTWIDTH] IN BLOOD BY AUTOMATED COUNT: 13.4 % (ref 12.3–15.4)
GLUCOSE UR STRIP-MCNC: NEGATIVE MG/DL
HCT VFR BLD AUTO: 34.3 % (ref 34–46.6)
HGB BLD-MCNC: 11.6 G/DL (ref 12–15.9)
LDH SERPL-CCNC: 200 U/L (ref 135–214)
MCH RBC QN AUTO: 28.4 PG (ref 26.6–33)
MCHC RBC AUTO-ENTMCNC: 33.8 G/DL (ref 31.5–35.7)
MCV RBC AUTO: 84.1 FL (ref 79–97)
PLATELET # BLD AUTO: 186 10*3/MM3 (ref 140–450)
PMV BLD AUTO: 10.1 FL (ref 6–12)
PROT UR STRIP-MCNC: NEGATIVE MG/DL
RBC # BLD AUTO: 4.08 10*6/MM3 (ref 3.77–5.28)
RH BLD: POSITIVE
T&S EXPIRATION DATE: NORMAL
URATE SERPL-MCNC: 4.2 MG/DL (ref 2.4–5.7)
WBC NRBC COR # BLD: 7.46 10*3/MM3 (ref 3.4–10.8)

## 2022-06-17 PROCEDURE — 84550 ASSAY OF BLOOD/URIC ACID: CPT

## 2022-06-17 PROCEDURE — 84460 ALANINE AMINO (ALT) (SGPT): CPT

## 2022-06-17 PROCEDURE — 86900 BLOOD TYPING SEROLOGIC ABO: CPT

## 2022-06-17 PROCEDURE — 85027 COMPLETE CBC AUTOMATED: CPT

## 2022-06-17 PROCEDURE — 84075 ASSAY ALKALINE PHOSPHATASE: CPT

## 2022-06-17 PROCEDURE — 36415 COLL VENOUS BLD VENIPUNCTURE: CPT

## 2022-06-17 PROCEDURE — 86901 BLOOD TYPING SEROLOGIC RH(D): CPT

## 2022-06-17 PROCEDURE — 84450 TRANSFERASE (AST) (SGOT): CPT

## 2022-06-17 PROCEDURE — C9803 HOPD COVID-19 SPEC COLLECT: HCPCS

## 2022-06-17 PROCEDURE — 82565 ASSAY OF CREATININE: CPT

## 2022-06-17 PROCEDURE — 99212 OFFICE O/P EST SF 10 MIN: CPT | Performed by: NURSE PRACTITIONER

## 2022-06-17 PROCEDURE — U0004 COV-19 TEST NON-CDC HGH THRU: HCPCS

## 2022-06-17 PROCEDURE — 86850 RBC ANTIBODY SCREEN: CPT

## 2022-06-17 PROCEDURE — 82247 BILIRUBIN TOTAL: CPT

## 2022-06-17 PROCEDURE — 83615 LACTATE (LD) (LDH) ENZYME: CPT

## 2022-06-17 NOTE — PROGRESS NOTES
OB FOLLOW UP  CC- Here for care of pregnancy        Kristel Fernandez is a 22 y.o.  38w4d patient being seen today for her obstetrical follow up visit. Patient reports occasional cramping and tightness.     Her prenatal care is complicated by (and status) :    Patient Active Problem List   Diagnosis   • Pregnancy   • Intrauterine pregnancy in teenager   • Trichomonal vaginitis   • Acute cystitis   • GBS (group B Streptococcus carrier), +RV culture, currently pregnant   • Lower abdominal pain   • Fetal renal anomaly, single gestation   • Decreased fetal movements in third trimester   • Vaginal discharge   • GERD without esophagitis   • Breech presentation         Flu Status: Declines  GBS Status: Patient is having a   Her Delivery Plan is: Primary . Scheduled  Ultrasound Today: Yes.  Findings showed --- breech; LOLI 19---- triple checked per US; all over 10.  I have personally evaluated the U/S and agree with the findings. SANGEETHA Jesus    ROS -   Patient Reports : Cramping and Tightness  Patient Denies: Loss of Fluid, Vaginal Spotting, Vision Changes, Headaches, Nausea , Vomiting  and Contractions  Fetal Movement : normal  All other systems reviewed and are negative.       The additional following portions of the patient's history were reviewed and updated as appropriate: allergies and current medications.    I have reviewed and agree with the HPI, ROS, and historical information as entered above. SANGEETHA Jesus        /66   Wt 67.4 kg (148 lb 9.6 oz)   LMP 10/24/2021   BMI 30.01 kg/m²     EXAM:     FHT: see US; wnl BPM     Urine glucose/protein: See prenatal flowsheet       Assessment and Plan    Problem List Items Addressed This Visit        Gravid and     Pregnancy - Primary    Overview     Dates by 9 1/7 wk u/s  Cell free DNA screen low risk; consistent with female fetus.  aFP screen was negative.  Unilateral Multicystic dysplastic kidney.   Breech;  6/20/2022 at 9 AM; 6/17/2022 2:15 PM PAT            Relevant Orders    POC Urinalysis Dipstick          1. Pregnancy at 38w4d.  LOLI wnl now--- over 10  2. Fetal status reassuring.   3. Activity and Exercise discussed.  Return if symptoms worsen or fail to improve, for Next scheduled follow up.    Estefania Bose, APRN  06/17/2022

## 2022-06-18 LAB — SARS-COV-2 RNA PNL SPEC NAA+PROBE: NOT DETECTED

## 2022-06-20 ENCOUNTER — ANESTHESIA (OUTPATIENT)
Dept: LABOR AND DELIVERY | Facility: HOSPITAL | Age: 22
End: 2022-06-20

## 2022-06-20 ENCOUNTER — HOSPITAL ENCOUNTER (INPATIENT)
Facility: HOSPITAL | Age: 22
LOS: 3 days | Discharge: HOME | End: 2022-06-23
Attending: OBSTETRICS & GYNECOLOGY | Admitting: OBSTETRICS & GYNECOLOGY

## 2022-06-20 ENCOUNTER — ANESTHESIA EVENT (OUTPATIENT)
Dept: LABOR AND DELIVERY | Facility: HOSPITAL | Age: 22
End: 2022-06-20

## 2022-06-20 DIAGNOSIS — Z3A.39 39 WEEKS GESTATION OF PREGNANCY: ICD-10-CM

## 2022-06-20 LAB
ABO GROUP BLD: NORMAL
RH BLD: POSITIVE

## 2022-06-20 PROCEDURE — 59515 CESAREAN DELIVERY: CPT | Performed by: OBSTETRICS & GYNECOLOGY

## 2022-06-20 PROCEDURE — 25010000002 FENTANYL CITRATE (PF) 50 MCG/ML SOLUTION: Performed by: NURSE ANESTHETIST, CERTIFIED REGISTERED

## 2022-06-20 PROCEDURE — 25010000002 KETOROLAC TROMETHAMINE PER 15 MG: Performed by: OBSTETRICS & GYNECOLOGY

## 2022-06-20 PROCEDURE — 25010000002 MIDAZOLAM PER 1 MG: Performed by: NURSE ANESTHETIST, CERTIFIED REGISTERED

## 2022-06-20 PROCEDURE — S0260 H&P FOR SURGERY: HCPCS | Performed by: OBSTETRICS & GYNECOLOGY

## 2022-06-20 PROCEDURE — 25010000002 METOCLOPRAMIDE PER 10 MG: Performed by: NURSE ANESTHETIST, CERTIFIED REGISTERED

## 2022-06-20 PROCEDURE — 86901 BLOOD TYPING SEROLOGIC RH(D): CPT

## 2022-06-20 PROCEDURE — 25010000002 CEFAZOLIN IN DEXTROSE 2-4 GM/100ML-% SOLUTION: Performed by: OBSTETRICS & GYNECOLOGY

## 2022-06-20 PROCEDURE — 25010000002 ONDANSETRON PER 1 MG: Performed by: NURSE ANESTHETIST, CERTIFIED REGISTERED

## 2022-06-20 PROCEDURE — 86900 BLOOD TYPING SEROLOGIC ABO: CPT

## 2022-06-20 PROCEDURE — 59025 FETAL NON-STRESS TEST: CPT

## 2022-06-20 PROCEDURE — 0 MORPHINE PER 10 MG: Performed by: NURSE ANESTHETIST, CERTIFIED REGISTERED

## 2022-06-20 RX ORDER — OXYTOCIN/0.9 % SODIUM CHLORIDE 30/500 ML
85 PLASTIC BAG, INJECTION (ML) INTRAVENOUS ONCE
Status: COMPLETED | OUTPATIENT
Start: 2022-06-20 | End: 2022-06-20

## 2022-06-20 RX ORDER — FAMOTIDINE 10 MG/ML
INJECTION, SOLUTION INTRAVENOUS AS NEEDED
Status: DISCONTINUED | OUTPATIENT
Start: 2022-06-20 | End: 2022-06-20 | Stop reason: SURG

## 2022-06-20 RX ORDER — ACETAMINOPHEN 500 MG
1000 TABLET ORAL ONCE
Status: COMPLETED | OUTPATIENT
Start: 2022-06-20 | End: 2022-06-20

## 2022-06-20 RX ORDER — DIPHENHYDRAMINE HCL 25 MG
25 CAPSULE ORAL EVERY 4 HOURS PRN
Status: DISCONTINUED | OUTPATIENT
Start: 2022-06-20 | End: 2022-06-23 | Stop reason: HOSPADM

## 2022-06-20 RX ORDER — MIDAZOLAM HYDROCHLORIDE 1 MG/ML
INJECTION INTRAMUSCULAR; INTRAVENOUS AS NEEDED
Status: DISCONTINUED | OUTPATIENT
Start: 2022-06-20 | End: 2022-06-20 | Stop reason: SURG

## 2022-06-20 RX ORDER — CARBOPROST TROMETHAMINE 250 UG/ML
250 INJECTION, SOLUTION INTRAMUSCULAR AS NEEDED
Status: DISCONTINUED | OUTPATIENT
Start: 2022-06-20 | End: 2022-06-20 | Stop reason: HOSPADM

## 2022-06-20 RX ORDER — DIPHENHYDRAMINE HYDROCHLORIDE 50 MG/ML
25 INJECTION INTRAMUSCULAR; INTRAVENOUS EVERY 4 HOURS PRN
Status: DISCONTINUED | OUTPATIENT
Start: 2022-06-20 | End: 2022-06-23 | Stop reason: HOSPADM

## 2022-06-20 RX ORDER — CALCIUM CARBONATE 200(500)MG
1 TABLET,CHEWABLE ORAL EVERY 4 HOURS PRN
Status: DISCONTINUED | OUTPATIENT
Start: 2022-06-20 | End: 2022-06-23 | Stop reason: HOSPADM

## 2022-06-20 RX ORDER — METOCLOPRAMIDE HYDROCHLORIDE 5 MG/ML
INJECTION INTRAMUSCULAR; INTRAVENOUS AS NEEDED
Status: DISCONTINUED | OUTPATIENT
Start: 2022-06-20 | End: 2022-06-20 | Stop reason: SURG

## 2022-06-20 RX ORDER — HYDROCORTISONE 25 MG/G
1 CREAM TOPICAL AS NEEDED
Status: DISCONTINUED | OUTPATIENT
Start: 2022-06-20 | End: 2022-06-23 | Stop reason: HOSPADM

## 2022-06-20 RX ORDER — DOCUSATE SODIUM 100 MG/1
100 CAPSULE, LIQUID FILLED ORAL 2 TIMES DAILY PRN
Status: DISCONTINUED | OUTPATIENT
Start: 2022-06-20 | End: 2022-06-21

## 2022-06-20 RX ORDER — ACETAMINOPHEN 325 MG/1
650 TABLET ORAL EVERY 6 HOURS
Status: DISCONTINUED | OUTPATIENT
Start: 2022-06-21 | End: 2022-06-23 | Stop reason: HOSPADM

## 2022-06-20 RX ORDER — KETOROLAC TROMETHAMINE 15 MG/ML
15 INJECTION, SOLUTION INTRAMUSCULAR; INTRAVENOUS EVERY 6 HOURS
Status: COMPLETED | OUTPATIENT
Start: 2022-06-20 | End: 2022-06-21

## 2022-06-20 RX ORDER — ACETAMINOPHEN 500 MG
1000 TABLET ORAL EVERY 6 HOURS
Status: COMPLETED | OUTPATIENT
Start: 2022-06-20 | End: 2022-06-21

## 2022-06-20 RX ORDER — MORPHINE SULFATE 0.5 MG/ML
INJECTION, SOLUTION EPIDURAL; INTRATHECAL; INTRAVENOUS AS NEEDED
Status: DISCONTINUED | OUTPATIENT
Start: 2022-06-20 | End: 2022-06-20 | Stop reason: SURG

## 2022-06-20 RX ORDER — OXYCODONE HYDROCHLORIDE 5 MG/1
10 TABLET ORAL EVERY 6 HOURS PRN
Status: DISCONTINUED | OUTPATIENT
Start: 2022-06-20 | End: 2022-06-23 | Stop reason: HOSPADM

## 2022-06-20 RX ORDER — CEFAZOLIN SODIUM 2 G/100ML
2 INJECTION, SOLUTION INTRAVENOUS ONCE
Status: COMPLETED | OUTPATIENT
Start: 2022-06-20 | End: 2022-06-20

## 2022-06-20 RX ORDER — MISOPROSTOL 200 UG/1
800 TABLET ORAL AS NEEDED
Status: DISCONTINUED | OUTPATIENT
Start: 2022-06-20 | End: 2022-06-20 | Stop reason: HOSPADM

## 2022-06-20 RX ORDER — OXYCODONE HYDROCHLORIDE 5 MG/1
5 TABLET ORAL EVERY 6 HOURS PRN
Status: DISCONTINUED | OUTPATIENT
Start: 2022-06-20 | End: 2022-06-23 | Stop reason: HOSPADM

## 2022-06-20 RX ORDER — NALOXONE HCL 0.4 MG/ML
0.4 VIAL (ML) INJECTION
Status: ACTIVE | OUTPATIENT
Start: 2022-06-20 | End: 2022-06-21

## 2022-06-20 RX ORDER — OXYTOCIN/0.9 % SODIUM CHLORIDE 30/500 ML
PLASTIC BAG, INJECTION (ML) INTRAVENOUS AS NEEDED
Status: DISCONTINUED | OUTPATIENT
Start: 2022-06-20 | End: 2022-06-20 | Stop reason: SURG

## 2022-06-20 RX ORDER — FENTANYL CITRATE 50 UG/ML
50 INJECTION, SOLUTION INTRAMUSCULAR; INTRAVENOUS
Status: DISCONTINUED | OUTPATIENT
Start: 2022-06-20 | End: 2022-06-23 | Stop reason: HOSPADM

## 2022-06-20 RX ORDER — CARBOPROST TROMETHAMINE 250 UG/ML
250 INJECTION, SOLUTION INTRAMUSCULAR AS NEEDED
Status: DISCONTINUED | OUTPATIENT
Start: 2022-06-20 | End: 2022-06-23 | Stop reason: HOSPADM

## 2022-06-20 RX ORDER — BUPIVACAINE HYDROCHLORIDE 7.5 MG/ML
INJECTION, SOLUTION INTRASPINAL AS NEEDED
Status: DISCONTINUED | OUTPATIENT
Start: 2022-06-20 | End: 2022-06-20 | Stop reason: SURG

## 2022-06-20 RX ORDER — BUPIVACAINE HCL/0.9 % NACL/PF 0.125 %
PLASTIC BAG, INJECTION (ML) EPIDURAL AS NEEDED
Status: DISCONTINUED | OUTPATIENT
Start: 2022-06-20 | End: 2022-06-20 | Stop reason: SURG

## 2022-06-20 RX ORDER — TRISODIUM CITRATE DIHYDRATE AND CITRIC ACID MONOHYDRATE 500; 334 MG/5ML; MG/5ML
30 SOLUTION ORAL ONCE
Status: COMPLETED | OUTPATIENT
Start: 2022-06-20 | End: 2022-06-20

## 2022-06-20 RX ORDER — METHYLERGONOVINE MALEATE 0.2 MG/ML
200 INJECTION INTRAVENOUS ONCE AS NEEDED
Status: DISCONTINUED | OUTPATIENT
Start: 2022-06-20 | End: 2022-06-20 | Stop reason: HOSPADM

## 2022-06-20 RX ORDER — IBUPROFEN 600 MG/1
600 TABLET ORAL EVERY 6 HOURS
Status: DISCONTINUED | OUTPATIENT
Start: 2022-06-21 | End: 2022-06-23 | Stop reason: HOSPADM

## 2022-06-20 RX ORDER — OXYTOCIN/0.9 % SODIUM CHLORIDE 30/500 ML
650 PLASTIC BAG, INJECTION (ML) INTRAVENOUS ONCE
Status: DISCONTINUED | OUTPATIENT
Start: 2022-06-20 | End: 2022-06-20 | Stop reason: HOSPADM

## 2022-06-20 RX ORDER — ONDANSETRON 2 MG/ML
4 INJECTION INTRAMUSCULAR; INTRAVENOUS EVERY 6 HOURS PRN
Status: DISCONTINUED | OUTPATIENT
Start: 2022-06-20 | End: 2022-06-23 | Stop reason: HOSPADM

## 2022-06-20 RX ORDER — ONDANSETRON 2 MG/ML
INJECTION INTRAMUSCULAR; INTRAVENOUS AS NEEDED
Status: DISCONTINUED | OUTPATIENT
Start: 2022-06-20 | End: 2022-06-20 | Stop reason: SURG

## 2022-06-20 RX ORDER — PRENATAL VIT/IRON FUM/FOLIC AC 27MG-0.8MG
1 TABLET ORAL DAILY
Status: DISCONTINUED | OUTPATIENT
Start: 2022-06-20 | End: 2022-06-23 | Stop reason: HOSPADM

## 2022-06-20 RX ORDER — ONDANSETRON 2 MG/ML
4 INJECTION INTRAMUSCULAR; INTRAVENOUS ONCE AS NEEDED
Status: COMPLETED | OUTPATIENT
Start: 2022-06-20 | End: 2022-06-20

## 2022-06-20 RX ORDER — SODIUM CHLORIDE, SODIUM LACTATE, POTASSIUM CHLORIDE, CALCIUM CHLORIDE 600; 310; 30; 20 MG/100ML; MG/100ML; MG/100ML; MG/100ML
125 INJECTION, SOLUTION INTRAVENOUS CONTINUOUS
Status: DISCONTINUED | OUTPATIENT
Start: 2022-06-20 | End: 2022-06-23 | Stop reason: HOSPADM

## 2022-06-20 RX ORDER — FENTANYL CITRATE 50 UG/ML
INJECTION, SOLUTION INTRAMUSCULAR; INTRAVENOUS AS NEEDED
Status: DISCONTINUED | OUTPATIENT
Start: 2022-06-20 | End: 2022-06-20 | Stop reason: SURG

## 2022-06-20 RX ORDER — SIMETHICONE 80 MG
80 TABLET,CHEWABLE ORAL 4 TIMES DAILY PRN
Status: DISCONTINUED | OUTPATIENT
Start: 2022-06-20 | End: 2022-06-23 | Stop reason: HOSPADM

## 2022-06-20 RX ORDER — KETOROLAC TROMETHAMINE 30 MG/ML
30 INJECTION, SOLUTION INTRAMUSCULAR; INTRAVENOUS ONCE
Status: COMPLETED | OUTPATIENT
Start: 2022-06-20 | End: 2022-06-20

## 2022-06-20 RX ORDER — METHYLERGONOVINE MALEATE 0.2 MG/ML
200 INJECTION INTRAVENOUS ONCE AS NEEDED
Status: DISCONTINUED | OUTPATIENT
Start: 2022-06-20 | End: 2022-06-23 | Stop reason: HOSPADM

## 2022-06-20 RX ORDER — ALUMINA, MAGNESIA, AND SIMETHICONE 2400; 2400; 240 MG/30ML; MG/30ML; MG/30ML
15 SUSPENSION ORAL EVERY 4 HOURS PRN
Status: DISCONTINUED | OUTPATIENT
Start: 2022-06-20 | End: 2022-06-23 | Stop reason: HOSPADM

## 2022-06-20 RX ADMIN — OXYTOCIN 85 ML/HR: 10 INJECTION INTRAVENOUS at 10:44

## 2022-06-20 RX ADMIN — ACETAMINOPHEN 1000 MG: 500 TABLET ORAL at 14:46

## 2022-06-20 RX ADMIN — KETOROLAC TROMETHAMINE 30 MG: 30 INJECTION, SOLUTION INTRAMUSCULAR; INTRAVENOUS at 11:05

## 2022-06-20 RX ADMIN — BUPIVACAINE HYDROCHLORIDE IN DEXTROSE 1.4 ML: 7.5 INJECTION, SOLUTION SUBARACHNOID at 09:30

## 2022-06-20 RX ADMIN — MIDAZOLAM 1 MG: 1 INJECTION INTRAMUSCULAR; INTRAVENOUS at 10:05

## 2022-06-20 RX ADMIN — FENTANYL CITRATE 20 MCG: 50 INJECTION, SOLUTION INTRAMUSCULAR; INTRAVENOUS at 09:30

## 2022-06-20 RX ADMIN — Medication 100 MCG: at 10:08

## 2022-06-20 RX ADMIN — SODIUM CHLORIDE, POTASSIUM CHLORIDE, SODIUM LACTATE AND CALCIUM CHLORIDE 125 ML/HR: 600; 310; 30; 20 INJECTION, SOLUTION INTRAVENOUS at 08:39

## 2022-06-20 RX ADMIN — ACETAMINOPHEN 1000 MG: 500 TABLET ORAL at 21:16

## 2022-06-20 RX ADMIN — SODIUM CHLORIDE, POTASSIUM CHLORIDE, SODIUM LACTATE AND CALCIUM CHLORIDE 125 ML/HR: 600; 310; 30; 20 INJECTION, SOLUTION INTRAVENOUS at 11:08

## 2022-06-20 RX ADMIN — Medication 500 ML: at 09:52

## 2022-06-20 RX ADMIN — SODIUM CHLORIDE, POTASSIUM CHLORIDE, SODIUM LACTATE AND CALCIUM CHLORIDE 1000 ML: 600; 310; 30; 20 INJECTION, SOLUTION INTRAVENOUS at 08:10

## 2022-06-20 RX ADMIN — ONDANSETRON 4 MG: 2 INJECTION INTRAMUSCULAR; INTRAVENOUS at 09:47

## 2022-06-20 RX ADMIN — METOCLOPRAMIDE 10 MG: 5 INJECTION, SOLUTION INTRAMUSCULAR; INTRAVENOUS at 09:49

## 2022-06-20 RX ADMIN — KETOROLAC TROMETHAMINE 15 MG: 15 INJECTION, SOLUTION INTRAMUSCULAR; INTRAVENOUS at 18:06

## 2022-06-20 RX ADMIN — MORPHINE SULFATE 0.15 MG: 0.5 INJECTION, SOLUTION EPIDURAL; INTRATHECAL; INTRAVENOUS at 09:30

## 2022-06-20 RX ADMIN — FAMOTIDINE 20 MG: 10 INJECTION, SOLUTION INTRAVENOUS at 09:47

## 2022-06-20 RX ADMIN — SODIUM CHLORIDE, POTASSIUM CHLORIDE, SODIUM LACTATE AND CALCIUM CHLORIDE 125 ML/HR: 600; 310; 30; 20 INJECTION, SOLUTION INTRAVENOUS at 19:58

## 2022-06-20 RX ADMIN — ACETAMINOPHEN 1000 MG: 500 TABLET ORAL at 08:12

## 2022-06-20 RX ADMIN — ONDANSETRON 4 MG: 2 INJECTION INTRAMUSCULAR; INTRAVENOUS at 12:28

## 2022-06-20 RX ADMIN — CEFAZOLIN SODIUM 2 G: 2 INJECTION, SOLUTION INTRAVENOUS at 09:18

## 2022-06-20 RX ADMIN — MIDAZOLAM 1 MG: 1 INJECTION INTRAMUSCULAR; INTRAVENOUS at 09:52

## 2022-06-20 RX ADMIN — MIDAZOLAM 1 MG: 1 INJECTION INTRAMUSCULAR; INTRAVENOUS at 09:22

## 2022-06-20 RX ADMIN — SODIUM CHLORIDE, POTASSIUM CHLORIDE, SODIUM LACTATE AND CALCIUM CHLORIDE: 600; 310; 30; 20 INJECTION, SOLUTION INTRAVENOUS at 09:39

## 2022-06-20 RX ADMIN — SODIUM CITRATE AND CITRIC ACID MONOHYDRATE 30 ML: 500; 334 SOLUTION ORAL at 09:17

## 2022-06-20 NOTE — ANESTHESIA POSTPROCEDURE EVALUATION
Patient: Kristel Fernandez    Procedure Summary     Date: 22 Room / Location: [facility] LABOR DELIVERY   [facility] LABOR DELIVERY    Anesthesia Start: 921 Anesthesia Stop:     Procedure:  SECTION PRIMARY BREECH (N/A Abdomen) Diagnosis:     Surgeons: Kurt Corea MD Provider: Marilia Nixon DO    Anesthesia Type: spinal, ITN ASA Status: 2          Anesthesia Type: spinal, ITN    Vitals  Vitals Value Taken Time   /62 22 1022   Temp 97.8    Pulse 84 22 1023   Resp 18    SpO2 92 % 22 1023   Vitals shown include unvalidated device data.        Post Anesthesia Care and Evaluation    Patient location during evaluation: bedside  Patient participation: complete - patient participated  Level of consciousness: awake and alert  Pain score: 0  Pain management: adequate    Airway patency: patent  Anesthetic complications: No anesthetic complications    Cardiovascular status: acceptable  Respiratory status: acceptable  Hydration status: acceptable

## 2022-06-20 NOTE — ANESTHESIA PREPROCEDURE EVALUATION
Anesthesia Evaluation     Patient summary reviewed and Nursing notes reviewed   NPO Solid Status: > 8 hours  NPO Liquid Status: > 8 hours           Airway   Dental      Pulmonary - negative pulmonary ROS   Cardiovascular - negative cardio ROS        Neuro/Psych- negative ROS  GI/Hepatic/Renal/Endo    (+)  GERD,      Musculoskeletal (-) negative ROS    Abdominal    Substance History - negative use     OB/GYN    (+) Pregnant,         Other                        Anesthesia Plan    ASA 2     spinal and ITN       Anesthetic plan, risks, benefits, and alternatives have been provided, discussed and informed consent has been obtained with: patient.        CODE STATUS:

## 2022-06-21 LAB
BASOPHILS # BLD AUTO: 0.02 10*3/MM3 (ref 0–0.2)
BASOPHILS NFR BLD AUTO: 0.3 % (ref 0–1.5)
DEPRECATED RDW RBC AUTO: 41.3 FL (ref 37–54)
EOSINOPHIL # BLD AUTO: 0.14 10*3/MM3 (ref 0–0.4)
EOSINOPHIL NFR BLD AUTO: 1.9 % (ref 0.3–6.2)
ERYTHROCYTE [DISTWIDTH] IN BLOOD BY AUTOMATED COUNT: 13.5 % (ref 12.3–15.4)
HCT VFR BLD AUTO: 29.2 % (ref 34–46.6)
HGB BLD-MCNC: 9.8 G/DL (ref 12–15.9)
IMM GRANULOCYTES # BLD AUTO: 0.02 10*3/MM3 (ref 0–0.05)
IMM GRANULOCYTES NFR BLD AUTO: 0.3 % (ref 0–0.5)
LYMPHOCYTES # BLD AUTO: 0.88 10*3/MM3 (ref 0.7–3.1)
LYMPHOCYTES NFR BLD AUTO: 12.2 % (ref 19.6–45.3)
MCH RBC QN AUTO: 28.5 PG (ref 26.6–33)
MCHC RBC AUTO-ENTMCNC: 33.6 G/DL (ref 31.5–35.7)
MCV RBC AUTO: 84.9 FL (ref 79–97)
MONOCYTES # BLD AUTO: 0.54 10*3/MM3 (ref 0.1–0.9)
MONOCYTES NFR BLD AUTO: 7.5 % (ref 5–12)
NEUTROPHILS NFR BLD AUTO: 5.64 10*3/MM3 (ref 1.7–7)
NEUTROPHILS NFR BLD AUTO: 77.8 % (ref 42.7–76)
NRBC BLD AUTO-RTO: 0 /100 WBC (ref 0–0.2)
PLATELET # BLD AUTO: 162 10*3/MM3 (ref 140–450)
PMV BLD AUTO: 10.5 FL (ref 6–12)
RBC # BLD AUTO: 3.44 10*6/MM3 (ref 3.77–5.28)
WBC NRBC COR # BLD: 7.24 10*3/MM3 (ref 3.4–10.8)

## 2022-06-21 PROCEDURE — 85025 COMPLETE CBC W/AUTO DIFF WBC: CPT | Performed by: OBSTETRICS & GYNECOLOGY

## 2022-06-21 PROCEDURE — 25010000002 KETOROLAC TROMETHAMINE PER 15 MG: Performed by: OBSTETRICS & GYNECOLOGY

## 2022-06-21 PROCEDURE — 0503F POSTPARTUM CARE VISIT: CPT

## 2022-06-21 RX ORDER — FAMOTIDINE 20 MG/1
20 TABLET, FILM COATED ORAL 2 TIMES DAILY
Status: DISCONTINUED | OUTPATIENT
Start: 2022-06-21 | End: 2022-06-23 | Stop reason: HOSPADM

## 2022-06-21 RX ORDER — DOCUSATE SODIUM 100 MG/1
100 CAPSULE, LIQUID FILLED ORAL 2 TIMES DAILY
Status: DISCONTINUED | OUTPATIENT
Start: 2022-06-21 | End: 2022-06-23 | Stop reason: HOSPADM

## 2022-06-21 RX ORDER — FERROUS SULFATE 325(65) MG
325 TABLET ORAL
Status: DISCONTINUED | OUTPATIENT
Start: 2022-06-21 | End: 2022-06-23 | Stop reason: HOSPADM

## 2022-06-21 RX ADMIN — SODIUM CHLORIDE, POTASSIUM CHLORIDE, SODIUM LACTATE AND CALCIUM CHLORIDE 125 ML/HR: 600; 310; 30; 20 INJECTION, SOLUTION INTRAVENOUS at 04:31

## 2022-06-21 RX ADMIN — DOCUSATE SODIUM 100 MG: 100 CAPSULE, LIQUID FILLED ORAL at 07:31

## 2022-06-21 RX ADMIN — KETOROLAC TROMETHAMINE 15 MG: 15 INJECTION, SOLUTION INTRAMUSCULAR; INTRAVENOUS at 06:25

## 2022-06-21 RX ADMIN — ACETAMINOPHEN 650 MG: 325 TABLET ORAL at 15:01

## 2022-06-21 RX ADMIN — DOCUSATE SODIUM 100 MG: 100 CAPSULE, LIQUID FILLED ORAL at 22:00

## 2022-06-21 RX ADMIN — ACETAMINOPHEN 1000 MG: 500 TABLET ORAL at 07:29

## 2022-06-21 RX ADMIN — ACETAMINOPHEN 650 MG: 325 TABLET ORAL at 21:59

## 2022-06-21 RX ADMIN — IBUPROFEN 600 MG: 600 TABLET ORAL at 18:01

## 2022-06-21 RX ADMIN — KETOROLAC TROMETHAMINE 15 MG: 15 INJECTION, SOLUTION INTRAMUSCULAR; INTRAVENOUS at 12:23

## 2022-06-21 RX ADMIN — SIMETHICONE 80 MG: 80 TABLET, CHEWABLE ORAL at 22:00

## 2022-06-21 RX ADMIN — PRENATAL VITAMINS-IRON FUMARATE 27 MG IRON-FOLIC ACID 0.8 MG TABLET 1 TABLET: at 07:31

## 2022-06-21 RX ADMIN — FAMOTIDINE 20 MG: 20 TABLET ORAL at 22:00

## 2022-06-21 RX ADMIN — KETOROLAC TROMETHAMINE 15 MG: 15 INJECTION, SOLUTION INTRAMUSCULAR; INTRAVENOUS at 00:06

## 2022-06-21 RX ADMIN — ACETAMINOPHEN 1000 MG: 500 TABLET ORAL at 02:52

## 2022-06-21 RX ADMIN — FERROUS SULFATE TAB 325 MG (65 MG ELEMENTAL FE) 325 MG: 325 (65 FE) TAB at 09:39

## 2022-06-21 RX ADMIN — FAMOTIDINE 20 MG: 20 TABLET ORAL at 07:30

## 2022-06-21 RX ADMIN — IBUPROFEN 600 MG: 600 TABLET ORAL at 23:54

## 2022-06-21 RX ADMIN — SIMETHICONE 80 MG: 80 TABLET, CHEWABLE ORAL at 18:01

## 2022-06-21 RX ADMIN — SIMETHICONE 80 MG: 80 TABLET, CHEWABLE ORAL at 07:29

## 2022-06-21 NOTE — ANESTHESIA POSTPROCEDURE EVALUATION
Patient: Kristel Fernandez    Procedure Summary     Date: 22 Room / Location: [facility] LABOR DELIVERY   [facility] LABOR DELIVERY    Anesthesia Start: 921 Anesthesia Stop:     Procedure:  SECTION PRIMARY BREECH (N/A Abdomen) Diagnosis:     Surgeons: Kurt Corea MD Provider: Marilia Nixon DO    Anesthesia Type: spinal, ITN ASA Status: 2          Anesthesia Type: spinal, ITN    Vitals  Vitals Value Taken Time   /73 22 0700   Temp 97.5 °F (36.4 °C) 22 0700   Pulse 59 22 0700   Resp 16 22 0700   SpO2 98 % 22 1122           Post Anesthesia Care and Evaluation    Patient location during evaluation: bedside  Patient participation: complete - patient participated  Level of consciousness: awake and alert  Pain management: adequate    Airway patency: patent  Anesthetic complications: No anesthetic complications    Cardiovascular status: acceptable  Respiratory status: acceptable  Hydration status: acceptable  Post Neuraxial Block status: Motor and sensory function returned to baseline and No signs or symptoms of PDPH

## 2022-06-22 PROCEDURE — 0503F POSTPARTUM CARE VISIT: CPT

## 2022-06-22 RX ADMIN — IBUPROFEN 600 MG: 600 TABLET ORAL at 12:08

## 2022-06-22 RX ADMIN — SIMETHICONE 80 MG: 80 TABLET, CHEWABLE ORAL at 12:08

## 2022-06-22 RX ADMIN — ACETAMINOPHEN 650 MG: 325 TABLET ORAL at 20:51

## 2022-06-22 RX ADMIN — DOCUSATE SODIUM 100 MG: 100 CAPSULE, LIQUID FILLED ORAL at 07:18

## 2022-06-22 RX ADMIN — DOCUSATE SODIUM 100 MG: 100 CAPSULE, LIQUID FILLED ORAL at 20:51

## 2022-06-22 RX ADMIN — FAMOTIDINE 20 MG: 20 TABLET ORAL at 20:52

## 2022-06-22 RX ADMIN — FERROUS SULFATE TAB 325 MG (65 MG ELEMENTAL FE) 325 MG: 325 (65 FE) TAB at 07:18

## 2022-06-22 RX ADMIN — SIMETHICONE 80 MG: 80 TABLET, CHEWABLE ORAL at 07:18

## 2022-06-22 RX ADMIN — IBUPROFEN 600 MG: 600 TABLET ORAL at 06:13

## 2022-06-22 RX ADMIN — ACETAMINOPHEN 650 MG: 325 TABLET ORAL at 15:04

## 2022-06-22 RX ADMIN — PRENATAL VITAMINS-IRON FUMARATE 27 MG IRON-FOLIC ACID 0.8 MG TABLET 1 TABLET: at 07:18

## 2022-06-22 RX ADMIN — IBUPROFEN 600 MG: 600 TABLET ORAL at 23:40

## 2022-06-22 RX ADMIN — ACETAMINOPHEN 650 MG: 325 TABLET ORAL at 07:18

## 2022-06-22 RX ADMIN — IBUPROFEN 600 MG: 600 TABLET ORAL at 17:29

## 2022-06-22 RX ADMIN — SIMETHICONE 80 MG: 80 TABLET, CHEWABLE ORAL at 17:29

## 2022-06-22 RX ADMIN — ACETAMINOPHEN 650 MG: 325 TABLET ORAL at 03:26

## 2022-06-22 RX ADMIN — FAMOTIDINE 20 MG: 20 TABLET ORAL at 07:18

## 2022-06-23 VITALS
DIASTOLIC BLOOD PRESSURE: 85 MMHG | RESPIRATION RATE: 18 BRPM | TEMPERATURE: 98 F | HEART RATE: 63 BPM | OXYGEN SATURATION: 98 % | SYSTOLIC BLOOD PRESSURE: 136 MMHG

## 2022-06-23 PROCEDURE — 0503F POSTPARTUM CARE VISIT: CPT | Performed by: NURSE PRACTITIONER

## 2022-06-23 RX ORDER — IBUPROFEN 600 MG/1
600 TABLET ORAL EVERY 6 HOURS
Qty: 30 TABLET | Refills: 0 | Status: SHIPPED | OUTPATIENT
Start: 2022-06-23

## 2022-06-23 RX ADMIN — PRENATAL VITAMINS-IRON FUMARATE 27 MG IRON-FOLIC ACID 0.8 MG TABLET 1 TABLET: at 08:45

## 2022-06-23 RX ADMIN — ACETAMINOPHEN 650 MG: 325 TABLET ORAL at 08:45

## 2022-06-23 RX ADMIN — SIMETHICONE 80 MG: 80 TABLET, CHEWABLE ORAL at 08:45

## 2022-06-23 RX ADMIN — IBUPROFEN 600 MG: 600 TABLET ORAL at 06:21

## 2022-06-23 RX ADMIN — IBUPROFEN 600 MG: 600 TABLET ORAL at 11:20

## 2022-06-23 RX ADMIN — DOCUSATE SODIUM 100 MG: 100 CAPSULE, LIQUID FILLED ORAL at 08:45

## 2022-06-23 RX ADMIN — ACETAMINOPHEN 650 MG: 325 TABLET ORAL at 03:21

## 2022-06-23 RX ADMIN — FERROUS SULFATE TAB 325 MG (65 MG ELEMENTAL FE) 325 MG: 325 (65 FE) TAB at 08:45

## 2022-06-23 RX ADMIN — FAMOTIDINE 20 MG: 20 TABLET ORAL at 08:45

## 2022-06-24 ENCOUNTER — APPOINTMENT (OUTPATIENT)
Dept: CT IMAGING | Facility: HOSPITAL | Age: 22
End: 2022-06-24

## 2022-06-24 ENCOUNTER — APPOINTMENT (OUTPATIENT)
Dept: PREADMISSION TESTING | Facility: HOSPITAL | Age: 22
End: 2022-06-24

## 2022-06-24 ENCOUNTER — APPOINTMENT (OUTPATIENT)
Dept: GENERAL RADIOLOGY | Facility: HOSPITAL | Age: 22
End: 2022-06-24

## 2022-06-24 ENCOUNTER — HOSPITAL ENCOUNTER (OUTPATIENT)
Facility: HOSPITAL | Age: 22
Discharge: HOME | End: 2022-06-24
Attending: EMERGENCY MEDICINE | Admitting: OBSTETRICS & GYNECOLOGY

## 2022-06-24 ENCOUNTER — APPOINTMENT (OUTPATIENT)
Dept: CARDIOLOGY | Facility: HOSPITAL | Age: 22
End: 2022-06-24

## 2022-06-24 VITALS
DIASTOLIC BLOOD PRESSURE: 73 MMHG | HEIGHT: 59 IN | BODY MASS INDEX: 33.26 KG/M2 | WEIGHT: 165 LBS | TEMPERATURE: 98.3 F | SYSTOLIC BLOOD PRESSURE: 127 MMHG | OXYGEN SATURATION: 92 % | HEART RATE: 93 BPM | RESPIRATION RATE: 18 BRPM

## 2022-06-24 DIAGNOSIS — R06.02 SHORTNESS OF BREATH: Primary | ICD-10-CM

## 2022-06-24 DIAGNOSIS — I10 HYPERTENSION, UNSPECIFIED TYPE: ICD-10-CM

## 2022-06-24 DIAGNOSIS — R10.9 ACUTE ABDOMINAL PAIN: ICD-10-CM

## 2022-06-24 DIAGNOSIS — R79.89 ELEVATED BRAIN NATRIURETIC PEPTIDE (BNP) LEVEL: ICD-10-CM

## 2022-06-24 DIAGNOSIS — R60.0 BILATERAL LEG EDEMA: ICD-10-CM

## 2022-06-24 LAB
ALBUMIN SERPL-MCNC: 3.6 G/DL (ref 3.5–5.2)
ALBUMIN/GLOB SERPL: 1.4 G/DL
ALP SERPL-CCNC: 142 U/L (ref 39–117)
ALP SERPL-CCNC: 142 U/L (ref 39–117)
ALT SERPL W P-5'-P-CCNC: 9 U/L (ref 1–33)
ALT SERPL W P-5'-P-CCNC: 9 U/L (ref 1–33)
ANION GAP SERPL CALCULATED.3IONS-SCNC: 10 MMOL/L (ref 5–15)
ASCENDING AORTA: 2.4 CM
AST SERPL-CCNC: 18 U/L (ref 1–32)
AST SERPL-CCNC: 18 U/L (ref 1–32)
BASOPHILS # BLD AUTO: 0.02 10*3/MM3 (ref 0–0.2)
BASOPHILS NFR BLD AUTO: 0.3 % (ref 0–1.5)
BH CV ECHO MEAS - AO MAX PG: 13.4 MMHG
BH CV ECHO MEAS - AO MEAN PG: 6 MMHG
BH CV ECHO MEAS - AO ROOT DIAM: 2.6 CM
BH CV ECHO MEAS - AO V2 MAX: 183 CM/SEC
BH CV ECHO MEAS - AO V2 VTI: 36.6 CM
BH CV ECHO MEAS - AVA(I,D): 1.26 CM2
BH CV ECHO MEAS - EDV(CUBED): 117.6 ML
BH CV ECHO MEAS - EDV(MOD-SP2): 79 ML
BH CV ECHO MEAS - EDV(MOD-SP4): 73.4 ML
BH CV ECHO MEAS - EF(MOD-BP): 58 %
BH CV ECHO MEAS - EF(MOD-SP2): 58.2 %
BH CV ECHO MEAS - EF(MOD-SP4): 59.5 %
BH CV ECHO MEAS - ESV(CUBED): 22 ML
BH CV ECHO MEAS - ESV(MOD-SP2): 33 ML
BH CV ECHO MEAS - ESV(MOD-SP4): 29.7 ML
BH CV ECHO MEAS - FS: 42.9 %
BH CV ECHO MEAS - IVS/LVPW: 0.89 CM
BH CV ECHO MEAS - IVSD: 0.8 CM
BH CV ECHO MEAS - LA DIMENSION: 3.2 CM
BH CV ECHO MEAS - LAT PEAK E' VEL: 23.2 CM/SEC
BH CV ECHO MEAS - LV MASS(C)D: 141.9 GRAMS
BH CV ECHO MEAS - LV MAX PG: 2.22 MMHG
BH CV ECHO MEAS - LV MEAN PG: 1 MMHG
BH CV ECHO MEAS - LV V1 MAX: 74.5 CM/SEC
BH CV ECHO MEAS - LV V1 VTI: 14.7 CM
BH CV ECHO MEAS - LVIDD: 4.9 CM
BH CV ECHO MEAS - LVIDS: 2.8 CM
BH CV ECHO MEAS - LVOT AREA: 3.1 CM2
BH CV ECHO MEAS - LVOT DIAM: 2 CM
BH CV ECHO MEAS - LVPWD: 0.9 CM
BH CV ECHO MEAS - MED PEAK E' VEL: 14.3 CM/SEC
BH CV ECHO MEAS - MV A MAX VEL: 50.4 CM/SEC
BH CV ECHO MEAS - MV DEC SLOPE: 476.5 CM/SEC2
BH CV ECHO MEAS - MV DEC TIME: 0.25 MSEC
BH CV ECHO MEAS - MV E MAX VEL: 104 CM/SEC
BH CV ECHO MEAS - MV E/A: 2.06
BH CV ECHO MEAS - MV MAX PG: 7.8 MMHG
BH CV ECHO MEAS - MV MEAN PG: 2 MMHG
BH CV ECHO MEAS - MV P1/2T: 86.7 MSEC
BH CV ECHO MEAS - MV V2 VTI: 36.3 CM
BH CV ECHO MEAS - MVA(P1/2T): 2.5 CM2
BH CV ECHO MEAS - MVA(VTI): 1.27 CM2
BH CV ECHO MEAS - PA ACC TIME: 0.23 SEC
BH CV ECHO MEAS - PA PR(ACCEL): -25.4 MMHG
BH CV ECHO MEAS - PA V2 MAX: 92.4 CM/SEC
BH CV ECHO MEAS - RAP SYSTOLE: 3 MMHG
BH CV ECHO MEAS - RVSP: 25.1 MMHG
BH CV ECHO MEAS - SV(LVOT): 46.2 ML
BH CV ECHO MEAS - SV(MOD-SP2): 46 ML
BH CV ECHO MEAS - SV(MOD-SP4): 43.7 ML
BH CV ECHO MEAS - TAPSE (>1.6): 2.5 CM
BH CV ECHO MEAS - TR MAX PG: 22.1 MMHG
BH CV ECHO MEAS - TR MAX VEL: 235 CM/SEC
BH CV ECHO MEASUREMENTS AVERAGE E/E' RATIO: 5.55
BH CV LOWER VASCULAR LEFT COMMON FEMORAL AUGMENT: NORMAL
BH CV LOWER VASCULAR LEFT COMMON FEMORAL COMPRESS: NORMAL
BH CV LOWER VASCULAR LEFT COMMON FEMORAL PHASIC: NORMAL
BH CV LOWER VASCULAR LEFT COMMON FEMORAL SPONT: NORMAL
BH CV LOWER VASCULAR LEFT DISTAL FEMORAL AUGMENT: NORMAL
BH CV LOWER VASCULAR LEFT DISTAL FEMORAL COMPRESS: NORMAL
BH CV LOWER VASCULAR LEFT DISTAL FEMORAL PHASIC: NORMAL
BH CV LOWER VASCULAR LEFT DISTAL FEMORAL SPONT: NORMAL
BH CV LOWER VASCULAR LEFT GASTRONEMIUS COMPRESS: NORMAL
BH CV LOWER VASCULAR LEFT GREATER SAPH AK COMPRESS: NORMAL
BH CV LOWER VASCULAR LEFT GREATER SAPH BK COMPRESS: NORMAL
BH CV LOWER VASCULAR LEFT LESSER SAPH COMPRESS: NORMAL
BH CV LOWER VASCULAR LEFT MID FEMORAL AUGMENT: NORMAL
BH CV LOWER VASCULAR LEFT MID FEMORAL COMPRESS: NORMAL
BH CV LOWER VASCULAR LEFT MID FEMORAL PHASIC: NORMAL
BH CV LOWER VASCULAR LEFT MID FEMORAL SPONT: NORMAL
BH CV LOWER VASCULAR LEFT PERONEAL COMPRESS: NORMAL
BH CV LOWER VASCULAR LEFT POPLITEAL AUGMENT: NORMAL
BH CV LOWER VASCULAR LEFT POPLITEAL COMPRESS: NORMAL
BH CV LOWER VASCULAR LEFT POPLITEAL PHASIC: NORMAL
BH CV LOWER VASCULAR LEFT POPLITEAL SPONT: NORMAL
BH CV LOWER VASCULAR LEFT POSTERIOR TIBIAL COMPRESS: NORMAL
BH CV LOWER VASCULAR LEFT PROFUNDA FEMORAL AUGMENT: NORMAL
BH CV LOWER VASCULAR LEFT PROFUNDA FEMORAL COMPRESS: NORMAL
BH CV LOWER VASCULAR LEFT PROFUNDA FEMORAL PHASIC: NORMAL
BH CV LOWER VASCULAR LEFT PROFUNDA FEMORAL SPONT: NORMAL
BH CV LOWER VASCULAR LEFT PROXIMAL FEMORAL AUGMENT: NORMAL
BH CV LOWER VASCULAR LEFT PROXIMAL FEMORAL COMPRESS: NORMAL
BH CV LOWER VASCULAR LEFT PROXIMAL FEMORAL PHASIC: NORMAL
BH CV LOWER VASCULAR LEFT PROXIMAL FEMORAL SPONT: NORMAL
BH CV LOWER VASCULAR LEFT SAPHENOFEMORAL JUNCTION AUGMENT: NORMAL
BH CV LOWER VASCULAR LEFT SAPHENOFEMORAL JUNCTION COMPRESS: NORMAL
BH CV LOWER VASCULAR LEFT SAPHENOFEMORAL JUNCTION PHASIC: NORMAL
BH CV LOWER VASCULAR LEFT SAPHENOFEMORAL JUNCTION SPONT: NORMAL
BH CV LOWER VASCULAR RIGHT COMMON FEMORAL AUGMENT: NORMAL
BH CV LOWER VASCULAR RIGHT COMMON FEMORAL COMPRESS: NORMAL
BH CV LOWER VASCULAR RIGHT COMMON FEMORAL PHASIC: NORMAL
BH CV LOWER VASCULAR RIGHT COMMON FEMORAL SPONT: NORMAL
BH CV LOWER VASCULAR RIGHT DISTAL FEMORAL AUGMENT: NORMAL
BH CV LOWER VASCULAR RIGHT DISTAL FEMORAL COMPRESS: NORMAL
BH CV LOWER VASCULAR RIGHT DISTAL FEMORAL PHASIC: NORMAL
BH CV LOWER VASCULAR RIGHT DISTAL FEMORAL SPONT: NORMAL
BH CV LOWER VASCULAR RIGHT GASTRONEMIUS COMPRESS: NORMAL
BH CV LOWER VASCULAR RIGHT GREATER SAPH AK COMPRESS: NORMAL
BH CV LOWER VASCULAR RIGHT GREATER SAPH BK COMPRESS: NORMAL
BH CV LOWER VASCULAR RIGHT LESSER SAPH COMPRESS: NORMAL
BH CV LOWER VASCULAR RIGHT MID FEMORAL AUGMENT: NORMAL
BH CV LOWER VASCULAR RIGHT MID FEMORAL COMPRESS: NORMAL
BH CV LOWER VASCULAR RIGHT MID FEMORAL PHASIC: NORMAL
BH CV LOWER VASCULAR RIGHT MID FEMORAL SPONT: NORMAL
BH CV LOWER VASCULAR RIGHT PERONEAL COMPRESS: NORMAL
BH CV LOWER VASCULAR RIGHT POPLITEAL AUGMENT: NORMAL
BH CV LOWER VASCULAR RIGHT POPLITEAL COMPRESS: NORMAL
BH CV LOWER VASCULAR RIGHT POPLITEAL PHASIC: NORMAL
BH CV LOWER VASCULAR RIGHT POPLITEAL SPONT: NORMAL
BH CV LOWER VASCULAR RIGHT POSTERIOR TIBIAL COMPRESS: NORMAL
BH CV LOWER VASCULAR RIGHT PROFUNDA FEMORAL AUGMENT: NORMAL
BH CV LOWER VASCULAR RIGHT PROFUNDA FEMORAL COMPRESS: NORMAL
BH CV LOWER VASCULAR RIGHT PROFUNDA FEMORAL PHASIC: NORMAL
BH CV LOWER VASCULAR RIGHT PROFUNDA FEMORAL SPONT: NORMAL
BH CV LOWER VASCULAR RIGHT PROXIMAL FEMORAL AUGMENT: NORMAL
BH CV LOWER VASCULAR RIGHT PROXIMAL FEMORAL COMPRESS: NORMAL
BH CV LOWER VASCULAR RIGHT PROXIMAL FEMORAL PHASIC: NORMAL
BH CV LOWER VASCULAR RIGHT PROXIMAL FEMORAL SPONT: NORMAL
BH CV LOWER VASCULAR RIGHT SAPHENOFEMORAL JUNCTION AUGMENT: NORMAL
BH CV LOWER VASCULAR RIGHT SAPHENOFEMORAL JUNCTION COMPRESS: NORMAL
BH CV LOWER VASCULAR RIGHT SAPHENOFEMORAL JUNCTION PHASIC: NORMAL
BH CV LOWER VASCULAR RIGHT SAPHENOFEMORAL JUNCTION SPONT: NORMAL
BH CV VAS BP RIGHT ARM: NORMAL MMHG
BH CV XLRA - RV BASE: 3.2 CM
BH CV XLRA - RV LENGTH: 6.5 CM
BH CV XLRA - RV MID: 2.9 CM
BH CV XLRA - TDI S': 11.5 CM/SEC
BILIRUB SERPL-MCNC: 0.5 MG/DL (ref 0–1.2)
BILIRUB SERPL-MCNC: 0.5 MG/DL (ref 0–1.2)
BUN SERPL-MCNC: 4 MG/DL (ref 6–20)
BUN/CREAT SERPL: 7.7 (ref 7–25)
CALCIUM SPEC-SCNC: 8.7 MG/DL (ref 8.6–10.5)
CHLORIDE SERPL-SCNC: 110 MMOL/L (ref 98–107)
CO2 SERPL-SCNC: 24 MMOL/L (ref 22–29)
CREAT SERPL-MCNC: 0.52 MG/DL (ref 0.57–1)
CREAT SERPL-MCNC: 0.52 MG/DL (ref 0.57–1)
DEPRECATED RDW RBC AUTO: 42.7 FL (ref 37–54)
EGFRCR SERPLBLD CKD-EPI 2021: 136.6 ML/MIN/1.73
EOSINOPHIL # BLD AUTO: 0.17 10*3/MM3 (ref 0–0.4)
EOSINOPHIL NFR BLD AUTO: 2.2 % (ref 0.3–6.2)
ERYTHROCYTE [DISTWIDTH] IN BLOOD BY AUTOMATED COUNT: 13.8 % (ref 12.3–15.4)
FLUAV RNA RESP QL NAA+PROBE: NOT DETECTED
FLUBV RNA RESP QL NAA+PROBE: NOT DETECTED
GLOBULIN UR ELPH-MCNC: 2.6 GM/DL
GLUCOSE SERPL-MCNC: 76 MG/DL (ref 65–99)
HCT VFR BLD AUTO: 32.6 % (ref 34–46.6)
HGB BLD-MCNC: 11 G/DL (ref 12–15.9)
HOLD SPECIMEN: NORMAL
IMM GRANULOCYTES # BLD AUTO: 0.03 10*3/MM3 (ref 0–0.05)
IMM GRANULOCYTES NFR BLD AUTO: 0.4 % (ref 0–0.5)
IVRT: 85 MSEC
LDH SERPL-CCNC: 251 U/L (ref 135–214)
LEFT ATRIUM VOLUME INDEX: 23.2 ML/M2
LV EF 2D ECHO EST: 60 %
LYMPHOCYTES # BLD AUTO: 0.93 10*3/MM3 (ref 0.7–3.1)
LYMPHOCYTES NFR BLD AUTO: 12.3 % (ref 19.6–45.3)
MAXIMAL PREDICTED HEART RATE: 200 BPM
MAXIMAL PREDICTED HEART RATE: 200 BPM
MCH RBC QN AUTO: 28.7 PG (ref 26.6–33)
MCHC RBC AUTO-ENTMCNC: 33.7 G/DL (ref 31.5–35.7)
MCV RBC AUTO: 85.1 FL (ref 79–97)
MONOCYTES # BLD AUTO: 0.65 10*3/MM3 (ref 0.1–0.9)
MONOCYTES NFR BLD AUTO: 8.6 % (ref 5–12)
NEUTROPHILS NFR BLD AUTO: 5.79 10*3/MM3 (ref 1.7–7)
NEUTROPHILS NFR BLD AUTO: 76.2 % (ref 42.7–76)
NRBC BLD AUTO-RTO: 0 /100 WBC (ref 0–0.2)
NT-PROBNP SERPL-MCNC: 997.6 PG/ML (ref 0–450)
PLATELET # BLD AUTO: 250 10*3/MM3 (ref 140–450)
PMV BLD AUTO: 10 FL (ref 6–12)
POTASSIUM SERPL-SCNC: 3.5 MMOL/L (ref 3.5–5.2)
PROT SERPL-MCNC: 6.2 G/DL (ref 6–8.5)
QT INTERVAL: 342 MS
QTC INTERVAL: 389 MS
RBC # BLD AUTO: 3.83 10*6/MM3 (ref 3.77–5.28)
SARS-COV-2 RNA RESP QL NAA+PROBE: NOT DETECTED
SODIUM SERPL-SCNC: 144 MMOL/L (ref 136–145)
STRESS TARGET HR: 170 BPM
STRESS TARGET HR: 170 BPM
TROPONIN T SERPL-MCNC: <0.01 NG/ML (ref 0–0.03)
URATE SERPL-MCNC: 5 MG/DL (ref 2.4–5.7)
WBC NRBC COR # BLD: 7.59 10*3/MM3 (ref 3.4–10.8)
WHOLE BLOOD HOLD COAG: NORMAL
WHOLE BLOOD HOLD SPECIMEN: NORMAL

## 2022-06-24 PROCEDURE — 87636 SARSCOV2 & INF A&B AMP PRB: CPT | Performed by: NURSE PRACTITIONER

## 2022-06-24 PROCEDURE — 74177 CT ABD & PELVIS W/CONTRAST: CPT

## 2022-06-24 PROCEDURE — 84075 ASSAY ALKALINE PHOSPHATASE: CPT | Performed by: NURSE PRACTITIONER

## 2022-06-24 PROCEDURE — 80053 COMPREHEN METABOLIC PANEL: CPT | Performed by: NURSE PRACTITIONER

## 2022-06-24 PROCEDURE — C9803 HOPD COVID-19 SPEC COLLECT: HCPCS

## 2022-06-24 PROCEDURE — 93005 ELECTROCARDIOGRAM TRACING: CPT | Performed by: NURSE PRACTITIONER

## 2022-06-24 PROCEDURE — G0463 HOSPITAL OUTPT CLINIC VISIT: HCPCS

## 2022-06-24 PROCEDURE — 83880 ASSAY OF NATRIURETIC PEPTIDE: CPT | Performed by: NURSE PRACTITIONER

## 2022-06-24 PROCEDURE — 84484 ASSAY OF TROPONIN QUANT: CPT | Performed by: NURSE PRACTITIONER

## 2022-06-24 PROCEDURE — 84450 TRANSFERASE (AST) (SGOT): CPT | Performed by: NURSE PRACTITIONER

## 2022-06-24 PROCEDURE — 99285 EMERGENCY DEPT VISIT HI MDM: CPT

## 2022-06-24 PROCEDURE — 25010000002 ONDANSETRON PER 1 MG: Performed by: EMERGENCY MEDICINE

## 2022-06-24 PROCEDURE — 84550 ASSAY OF BLOOD/URIC ACID: CPT | Performed by: NURSE PRACTITIONER

## 2022-06-24 PROCEDURE — 71275 CT ANGIOGRAPHY CHEST: CPT

## 2022-06-24 PROCEDURE — 85025 COMPLETE CBC W/AUTO DIFF WBC: CPT | Performed by: NURSE PRACTITIONER

## 2022-06-24 PROCEDURE — 82247 BILIRUBIN TOTAL: CPT | Performed by: NURSE PRACTITIONER

## 2022-06-24 PROCEDURE — 0 IOPAMIDOL PER 1 ML: Performed by: EMERGENCY MEDICINE

## 2022-06-24 PROCEDURE — 96374 THER/PROPH/DIAG INJ IV PUSH: CPT

## 2022-06-24 PROCEDURE — 93306 TTE W/DOPPLER COMPLETE: CPT

## 2022-06-24 PROCEDURE — 84460 ALANINE AMINO (ALT) (SGPT): CPT | Performed by: NURSE PRACTITIONER

## 2022-06-24 PROCEDURE — 93970 EXTREMITY STUDY: CPT

## 2022-06-24 PROCEDURE — 83615 LACTATE (LD) (LDH) ENZYME: CPT | Performed by: NURSE PRACTITIONER

## 2022-06-24 PROCEDURE — 71045 X-RAY EXAM CHEST 1 VIEW: CPT

## 2022-06-24 PROCEDURE — 96375 TX/PRO/DX INJ NEW DRUG ADDON: CPT

## 2022-06-24 PROCEDURE — 25010000002 HYDROMORPHONE PER 4 MG: Performed by: EMERGENCY MEDICINE

## 2022-06-24 PROCEDURE — 82565 ASSAY OF CREATININE: CPT | Performed by: NURSE PRACTITIONER

## 2022-06-24 RX ORDER — SODIUM CHLORIDE 0.9 % (FLUSH) 0.9 %
10 SYRINGE (ML) INJECTION AS NEEDED
Status: DISCONTINUED | OUTPATIENT
Start: 2022-06-24 | End: 2022-06-25 | Stop reason: HOSPADM

## 2022-06-24 RX ORDER — GUAIFENESIN AND DEXTROMETHORPHAN HYDROBROMIDE 600; 30 MG/1; MG/1
2 TABLET, EXTENDED RELEASE ORAL 2 TIMES DAILY PRN
Status: DISCONTINUED | OUTPATIENT
Start: 2022-06-24 | End: 2022-06-25 | Stop reason: HOSPADM

## 2022-06-24 RX ORDER — LABETALOL HYDROCHLORIDE 5 MG/ML
10 INJECTION, SOLUTION INTRAVENOUS ONCE
Status: DISCONTINUED | OUTPATIENT
Start: 2022-06-24 | End: 2022-06-25 | Stop reason: HOSPADM

## 2022-06-24 RX ORDER — HYDROMORPHONE HYDROCHLORIDE 1 MG/ML
0.5 INJECTION, SOLUTION INTRAMUSCULAR; INTRAVENOUS; SUBCUTANEOUS ONCE
Status: DISCONTINUED | OUTPATIENT
Start: 2022-06-24 | End: 2022-06-24

## 2022-06-24 RX ORDER — NIFEDIPINE 10 MG/1
10 CAPSULE ORAL EVERY 8 HOURS SCHEDULED
Status: DISCONTINUED | OUTPATIENT
Start: 2022-06-24 | End: 2022-06-25 | Stop reason: HOSPADM

## 2022-06-24 RX ORDER — ONDANSETRON 2 MG/ML
4 INJECTION INTRAMUSCULAR; INTRAVENOUS ONCE
Status: COMPLETED | OUTPATIENT
Start: 2022-06-24 | End: 2022-06-24

## 2022-06-24 RX ORDER — HYDROMORPHONE HYDROCHLORIDE 1 MG/ML
0.5 INJECTION, SOLUTION INTRAMUSCULAR; INTRAVENOUS; SUBCUTANEOUS ONCE
Status: COMPLETED | OUTPATIENT
Start: 2022-06-24 | End: 2022-06-24

## 2022-06-24 RX ADMIN — HYDROMORPHONE HYDROCHLORIDE 0.5 MG: 1 INJECTION, SOLUTION INTRAMUSCULAR; INTRAVENOUS; SUBCUTANEOUS at 12:38

## 2022-06-24 RX ADMIN — NIFEDIPINE 10 MG: 10 CAPSULE ORAL at 18:47

## 2022-06-24 RX ADMIN — IOPAMIDOL 95 ML: 755 INJECTION, SOLUTION INTRAVENOUS at 12:10

## 2022-06-24 RX ADMIN — GUAIFENESIN AND DEXTROMETHORPHAN HYDROBROMIDE 2 TABLET: 600; 30 TABLET, EXTENDED RELEASE ORAL at 17:17

## 2022-06-24 RX ADMIN — ONDANSETRON 4 MG: 2 INJECTION INTRAMUSCULAR; INTRAVENOUS at 12:38

## 2022-06-24 NOTE — H&P
[facility]  Obstetric History and Physical    Chief Complaint   Patient presents with   • Flu Symptoms   • Postpartum Complications       HPI:    Patient is a 22 y.o. female  POD #4 s/p LTCS.  SHe was discharged on POD#3, but returned to the E.R today with a complaint of a cough making it difficult to breath and causing her increased pain from her surgery.  She also noted leg swelling.    The E.R, worked her up for PE, cardiomyopathy, and started thinking pre-eclampsia.  She did have elevated BPs there, but also in pain.   She is COVID and Flu negative.   She was sent up to L&D after a negative CT and normal ECHO.           The following portions of the patients history were reviewed and updated as appropriate: current medications, allergies, past medical history, past surgical history, past family history, past social history and problem list .       Prenatal Information:   Maternal Prenatal Labs  Blood Type No results found for: ABO   Rh Status No results found for: RH   Antibody Screen No results found for: ABSCRN   Gonnorhea No results found for: GCCX   Chlamydia No results found for: CLAMYDCU   RPR No results found for: RPR   Syphilis Antibody No results found for: SYPHILIS   Rubella No results found for: RUBELLAIGGIN   Hepatitis B Surface Antigen No results found for: HEPBSAG   HIV-1 Antibody No results found for: LABHIV1   Hepatitis C Antibody No results found for: HEPCAB   Rapid Urin Drug Screen No results found for: AMPMETHU, BARBITSCNUR, LABBENZSCN, LABMETHSCN, LABOPIASCN, THCURSCR, COCAINEUR, AMPHETSCREEN, PROPOXSCN, BUPRENORSCNU, METAMPSCNUR, OXYCODONESCN, TRICYCLICSCN   Group B Strep Culture No results found for: GBSANTIGEN           External Prenatal Results     Pregnancy Outside Results - Transcribed From Office Records - See Scanned Records For Details     Test Value Date Time    ABO  O  22 0757    Rh  Positive  22 0757    Antibody Screen  Negative  22 1531       Negative   22 1102       Negative  21 1454    Varicella IgG       Rubella  5.01 index 21 1454    Hgb  11.0 g/dL 22 0956       9.8 g/dL 22 0635       11.6 g/dL 22 1531       12.2 g/dL 22 1102      ^ 12.4 g/dL 22 0849       14.4 g/dL 21 1454    Hct  32.6 % 22 0956       29.2 % 22 0635       34.3 % 22 1531       35.5 % 22 1102      ^ 35.9 % 22 0849       40.9 % 21 1454    Glucose Fasting GTT       Glucose Tolerance Test 1 hour       Glucose Tolerance Test 3 hour       Gonorrhea (discrete)  Negative  22 1230       Negative  21 1430       Negative  21 1454    Chlamydia (discrete)  Negative  22 1230       Negative  21 1430       Negative  21 1454    RPR  Non Reactive  21 1454    VDRL       Syphilis Antibody       HBsAg  Negative  21 1454    Herpes Simplex Virus PCR       Herpes Simplex VIrus Culture       HIV  Non Reactive  21 1454    Hep C RNA Quant PCR       Hep C Antibody  <0.1 s/co ratio 21 1454    AFP  51.4 ng/mL 22 1357    Group B Strep       GBS Susceptibility to Clindamycin       GBS Susceptibility to Erythromycin       Fetal Fibronectin       Genetic Testing, Maternal Blood             Drug Screening     Test Value Date Time    Urine Drug Screen       Amphetamine Screen  Negative ng/mL 21 1454    Barbiturate Screen  Negative ng/mL 21 1454    Benzodiazepine Screen  Negative ng/mL 21 1454    Methadone Screen  Negative ng/mL 21 1454    Phencyclidine Screen  Negative ng/mL 21 1454    Opiates Screen       THC Screen       Cocaine Screen       Propoxyphene Screen  Negative ng/mL 21 1454    Buprenorphine Screen       Methamphetamine Screen       Oxycodone Screen       Tricyclic Antidepressants Screen             Legend    ^: Historical                          Past OB History:     OB History    Para Term  AB Living   1 1 1 0 0 1    SAB IAB Ectopic Molar Multiple Live Births   0 0 0 0 0 1      # Outcome Date GA Lbr Cam/2nd Weight Sex Delivery Anes PTL Lv   1 Term 22 39w0d  3285 g (7 lb 3.9 oz) F CS-LTranv Spinal N LUIS      Name: MARYBEL PEREZ      Apgar1: 8  Apgar5: 9       Past Medical History: History reviewed. No pertinent past medical history.   Past Surgical History Past Surgical History:   Procedure Laterality Date   • APPENDECTOMY     •  SECTION N/A 2022    Procedure:  SECTION PRIMARY BREECH;  Surgeon: Kurt Corea MD;  Location: [facility] LABOR DELIVERY;  Service: Obstetrics/Gynecology;  Laterality: N/A;   • EAR TUBES        Family History: Family History   Problem Relation Age of Onset   • Diabetes Maternal Aunt    • Diabetes Maternal Grandmother    • Hypertension Maternal Grandmother       Social History:  reports that she has never smoked. She has never used smokeless tobacco.   reports no history of alcohol use.   reports current drug use. Drug: Marijuana.        Review of Systems  Denies fever, HA, CP, muscle weakness,                                             and rashes      Objective     Vital Signs Range for the last 24 hours  Temperature: Temp:  [98.5 °F (36.9 °C)-99.2 °F (37.3 °C)] 98.5 °F (36.9 °C)   Temp Source: Temp src: Oral   BP: BP: (140-168)/(75-98) 150/93   Pulse: Heart Rate:  [] 79   Respirations: Resp:  [18-20] 18   SPO2: SpO2:  [92 %-98 %] 92 %   O2 Amount (l/min):     O2 Devices Device (Oxygen Therapy): room air   Weight: Weight:  [74.8 kg (165 lb)] 74.8 kg (165 lb)     Physical Examination: General appearance - alert, ill appearing, but  in no distress and oriented to person, place, and time  Chest - clear to auscultation, no wheezes, rales or rhonchi, symmetric air entry  Heart - S1 and S2 normal,  Slight murmurs noted  Abdomen - soft, nontender, nondistended, no masses or organomegaly  no rebound tenderness noted  bowel sounds normal  No guarding, No RUQ  pain  Extremities - pedal edema 2 +        Laboratory Results:   Lab Results   Component Value Date     2022    HGB 11.0 (L) 2022    HCT 32.6 (L) 2022    WBC 7.59 2022     Lab Results   Component Value Date     2022    K 3.5 2022     (H) 2022    CO2 24.0 2022    BUN 4 (L) 2022    CREATININE 0.52 (L) 2022    CREATININE 0.52 (L) 2022    GLUCOSE 76 2022    ALBUMIN 3.60 2022    CALCIUM 8.7 2022    AST 18 2022    AST 18 2022    ALT 9 2022    ALT 9 2022    BILITOT 0.5 2022    BILITOT 0.5 2022     Lab Results   Component Value Date    HGB 11.0 (L) 2022     2022    AST 18 2022    AST 18 2022    ALT 9 2022    ALT 9 2022     (H) 2022    URICACID 5.0 2022    BUN 4 (L) 2022    CREATININE 0.52 (L) 2022    CREATININE 0.52 (L) 2022    GLUCOSE 76 2022               Assessment/Plan   21 yo  POD #4 s/p LTVS with elevated blood pressures and URI     1.  Observe on APU  2.  Monitor blood pressures - give a dose of 10 mg Procardia and see response.     3.  Mucinex for cough   4.  Re-evaluate later this evening  5. Discussed with Dr. Guerrero covering for Dr. Nahomy Stevens MD  2022  19:04 EDT

## 2022-06-24 NOTE — ED PROVIDER NOTES
Subjective   Pleasant patient presents the ER for fever cough difficulty breathing and leg swelling.  She was a  delivery this hospital several days ago by Dr. Corea.  Her blood pressure on arrival was 147/96.  She denies any history of hypertension or any history of preeclampsia during her pregnancy.  Her temperature is 99.9.  She tells me she is not currently breast-feeding but would like to.  She tells me she is having some abdominal discomfort related to her  scar but the majority of her complaint today is fever body aches difficulty breathing.      Shortness of Breath  Severity:  Moderate  Onset quality:  Gradual  Duration: several days.  Timing:  Constant  Progression:  Worsening  Chronicity:  New  Context: activity    Relieved by:  Rest and sitting up  Worsened by:  Activity  Associated symptoms: abdominal pain, cough and fever    Associated symptoms: no chest pain, no diaphoresis, no sore throat, no vomiting and no wheezing    Risk factors: recent surgery        Review of Systems   Constitutional: Positive for fever. Negative for chills and diaphoresis.   HENT: Negative for congestion and sore throat.    Respiratory: Positive for cough and shortness of breath. Negative for choking, chest tightness and wheezing.    Cardiovascular: Positive for leg swelling. Negative for chest pain.   Gastrointestinal: Positive for abdominal pain. Negative for abdominal distention, anal bleeding, blood in stool, constipation, diarrhea, nausea and vomiting.   Genitourinary: Negative for difficulty urinating, dysuria, flank pain, frequency, hematuria and urgency.   All other systems reviewed and are negative.      History reviewed. No pertinent past medical history.    No Known Allergies    Past Surgical History:   Procedure Laterality Date   • APPENDECTOMY     •  SECTION N/A 2022    Procedure:  SECTION PRIMARY BREECH;  Surgeon: Kurt Corea MD;  Location: [facility] LABOR DELIVERY;   Service: Obstetrics/Gynecology;  Laterality: N/A;   • EAR TUBES         Family History   Problem Relation Age of Onset   • Diabetes Maternal Aunt    • Diabetes Maternal Grandmother    • Hypertension Maternal Grandmother        Social History     Socioeconomic History   • Marital status: Single   Tobacco Use   • Smoking status: Never Smoker   • Smokeless tobacco: Never Used   Vaping Use   • Vaping Use: Never used   Substance and Sexual Activity   • Alcohol use: Never   • Drug use: Yes     Types: Marijuana     Comment: LAST USE 2021   • Sexual activity: Yes           Objective   Physical Exam  Constitutional:       Appearance: She is well-developed. She is ill-appearing.   HENT:      Head: Normocephalic and atraumatic.      Right Ear: External ear normal.      Left Ear: External ear normal.      Nose: Nose normal.   Eyes:      Conjunctiva/sclera: Conjunctivae normal.      Pupils: Pupils are equal, round, and reactive to light.   Cardiovascular:      Rate and Rhythm: Normal rate and regular rhythm.      Heart sounds: Normal heart sounds.   Pulmonary:      Effort: Pulmonary effort is normal.      Breath sounds: Decreased breath sounds present.   Abdominal:      General: Bowel sounds are normal.      Palpations: Abdomen is soft.   Musculoskeletal:         General: Normal range of motion.      Cervical back: Normal range of motion and neck supple.      Right lower leg: Edema present.      Left lower leg: Edema present.   Skin:     General: Skin is warm and dry.   Neurological:      Mental Status: She is alert and oriented to person, place, and time.   Psychiatric:         Behavior: Behavior normal.         Thought Content: Thought content normal.         Judgment: Judgment normal.         Procedures           ED Course  ED Course as of 06/24/22 1408   Fri Jun 24, 2022   0940 Pleasant patient with a broad differential.  We have to be concerned about pulmonary embolism pulmonary edema.  Infection pneumonia retained products  as well as preeclampsia.  Reassuringly her oxygenation is doing well she is not tachycardic.  Awaiting all lab work and chest x-ray. [JM]   1245 OB paged   [JM]   1335 I spoke with Dr. Guerrero and she will speak with Dr. Meza and call me back.   [JM]   1403 I spoke to Dr. Corea who recommends getting an echo.  I have ordered the echo and I spoke to Una with cardiology circum and speak to Dr. Braden who is on-call for echo today. [JM]   1407 I spoke to Dr. Braden who will read the echo. [JM]      ED Course User Index  [JM] Kurt Chamorro APRN           CT Angiogram Chest   Final Result       1. No acute cardiopulmonary abnormality. No evidence of pulmonary   embolism.   2. Mild emphysema.       This report was finalized on 2022 12:29 PM by Sharad Mccoy MD.          CT Abdomen Pelvis With Contrast   Final Result       1. Postoperative changes compatible with recent childbirth via   . No obvious scalp location.   2. No acute intra-abdominal abnormality.       This report was finalized on 2022 12:34 PM by Sharad Mccoy MD.          XR Chest 1 View   Final Result   No acute process.        This report was finalized on 2022 9:50 AM by Baldomero Medina MD.                                            MDM    Final diagnoses:   Shortness of breath   Elevated brain natriuretic peptide (BNP) level   Hypertension, unspecified type   Acute abdominal pain   Bilateral leg edema       ED Disposition  ED Disposition     ED Disposition   Decision to Admit    Condition   --    Comment   --             No follow-up provider specified.       Medication List      No changes were made to your prescriptions during this visit.          Kurt Chamorro APRN  22 1407       Kurt Chamorro APRN  22 1403

## 2022-06-25 NOTE — NURSING NOTE
Pt. A&Ox4. Pt. States she is feeling better. Vitals stable. Pt. Provided d/c instructions and verbalized understanding. Pt. Encouraged to keep follow-up appointment.     2146: Pt. Transported off of unit via wheelchair by PCT. Pt. States her mother is her transportation.

## 2022-06-25 NOTE — PROGRESS NOTES
Kristel Fernandez  [id number]  2000      Pt feeling better, labs normal, BP's better.  P/1)home     2)keep next sched appt    Aydin Ramirez MD  6/24/2022  21:38 EDT

## 2022-07-13 ENCOUNTER — POSTPARTUM VISIT (OUTPATIENT)
Dept: OBSTETRICS AND GYNECOLOGY | Facility: CLINIC | Age: 22
End: 2022-07-13

## 2022-07-13 VITALS
SYSTOLIC BLOOD PRESSURE: 120 MMHG | BODY MASS INDEX: 25.36 KG/M2 | WEIGHT: 125.8 LBS | DIASTOLIC BLOOD PRESSURE: 82 MMHG | HEIGHT: 59 IN

## 2022-07-13 DIAGNOSIS — F41.8 POSTPARTUM ANXIETY: ICD-10-CM

## 2022-07-13 PROCEDURE — 0503F POSTPARTUM CARE VISIT: CPT

## 2022-07-13 PROCEDURE — 99213 OFFICE O/P EST LOW 20 MIN: CPT

## 2022-07-13 NOTE — PROGRESS NOTES
Chief Complaint   Patient presents with   • Postpartum Care     2 week        2 Week Postpartum Visit         Kristel Fernandez is a 22 y.o.  who presents today for a 2 week postpartum check.        , Low Transverse    Information for the patient's :  Nicky Fernandez [[id number]]   2020   female   Nicky Fernandez   3285 g (7 lb 3.9 oz)   Gestational Age: 39w0d      Baby Discharged with Mom  Delivering MD: Kurt Corea MD.    Pregnancy complications: Breech    Patient had Primary C- Section. Patient reports her incision is clean, dry, intact.itchy Patient describes vaginal bleeding as light.  She is bottle feeding Patient denies bowel or bladder issues.    She desires contraceptive methods: None for contraception.    Patient reports feeling very anxious. Patient reports she believes she is only have anxiety from being a new mother. Postpartum Depression Screening Questionnaire: 13, treatment is indicated. Patient has support at home. She lives with her mother who helps out with the baby. She does not want to begin therapy. She talks to her best friend when needed. She would like to begin medication therapy today.     The additional following portions of the patient's history were reviewed and updated as appropriate: allergies and current medications.      Review of Systems   Constitutional: Negative.    Respiratory: Negative.    Cardiovascular: Negative.    Gastrointestinal: Negative.    Genitourinary: Positive for vaginal bleeding (Mild post cesearean bleeding ). Negative for pelvic pain, pelvic pressure, urgency, urinary incontinence, vaginal discharge and vaginal pain.   Skin: Negative for rash.        LTCS- Itching    Psychiatric/Behavioral: Negative for suicidal ideas and depressed mood. The patient is nervous/anxious.        I have reviewed and agree with the HPI, ROS, and historical information as entered above. Laura Ignacio, APRN    Objective  "  /82 (BP Location: Right arm, Patient Position: Sitting, Cuff Size: Adult)   Ht 149.9 cm (59.02\")   Wt 57.1 kg (125 lb 12.8 oz)   LMP 10/24/2021   Breastfeeding No   BMI 25.39 kg/m²     Physical Exam  Vitals and nursing note reviewed.   Constitutional:       Appearance: Normal appearance.   HENT:      Head: Normocephalic.   Cardiovascular:      Rate and Rhythm: Normal rate.   Pulmonary:      Effort: Pulmonary effort is normal.   Abdominal:      General: Bowel sounds are normal.      Palpations: Abdomen is soft.      Comments: LTCS incision well approximated; no drainage, bleeding, or redness. No rash   Neurological:      Mental Status: She is alert and oriented to person, place, and time.   Psychiatric:         Mood and Affect: Mood normal.         Behavior: Behavior normal.         Thought Content: Thought content normal.         Judgment: Judgment normal.            Assessment and Plan    Problem List Items Addressed This Visit    None     Visit Diagnoses     Postpartum follow-up    -  Primary    Postpartum anxiety              1. S/p , 2 weeks postpartum.  Doing well.    2. Continued pelvic rest with a return to driving and light physical activity.  3. Baby doing well.  4. Bottlefeeding going well.  5. Signs of postpartum anxiety - discussed options.  Encouraged consideration of counseling and encouraged to consider SSRI. Patient wants to begin Zoloft today. She will continue to talk about issues w/ best friend when needed. Risk/benefits of Zoloft discussed with patient. Pt NIKOLE and wants to proceed with beginning medication.  6. Contraception: contraceptive methods: Abstinence; Will discuss getting hormone patch for contraception at 6 week f/u.  7. Patient instructed to call if anxiety persists or worsen. Or if develops thoughts of self harm or harm to others. PT NIKOLE.  8. Keep LTCS incision clean, dry, & intact. OTC 1% Hydrocortisone cream PRN itching. Interdry cloth to keep dry  Return in " about 4 weeks (around 8/10/2022) for 6 week PP visit or sooner if needed.    Laura Ignacio, APRN  07/13/2022

## 2022-08-19 ENCOUNTER — POSTPARTUM VISIT (OUTPATIENT)
Dept: OBSTETRICS AND GYNECOLOGY | Facility: CLINIC | Age: 22
End: 2022-08-19

## 2022-08-19 VITALS
WEIGHT: 121 LBS | SYSTOLIC BLOOD PRESSURE: 110 MMHG | BODY MASS INDEX: 24.39 KG/M2 | DIASTOLIC BLOOD PRESSURE: 70 MMHG | HEIGHT: 59 IN

## 2022-08-19 DIAGNOSIS — N76.0 BV (BACTERIAL VAGINOSIS): ICD-10-CM

## 2022-08-19 DIAGNOSIS — O35.EXX0 FETAL RENAL ANOMALY, SINGLE GESTATION: ICD-10-CM

## 2022-08-19 DIAGNOSIS — N89.8 VAGINAL DISCHARGE: ICD-10-CM

## 2022-08-19 DIAGNOSIS — Z11.3 SCREENING EXAMINATION FOR STD (SEXUALLY TRANSMITTED DISEASE): ICD-10-CM

## 2022-08-19 DIAGNOSIS — B96.89 BV (BACTERIAL VAGINOSIS): ICD-10-CM

## 2022-08-19 PROBLEM — K21.9 GERD WITHOUT ESOPHAGITIS: Status: RESOLVED | Noted: 2022-04-14 | Resolved: 2022-08-19

## 2022-08-19 PROBLEM — O36.8130 DECREASED FETAL MOVEMENTS IN THIRD TRIMESTER: Status: RESOLVED | Noted: 2022-04-14 | Resolved: 2022-08-19

## 2022-08-19 PROBLEM — N30.00 ACUTE CYSTITIS: Status: RESOLVED | Noted: 2021-12-09 | Resolved: 2022-08-19

## 2022-08-19 PROBLEM — Z34.90 PREGNANCY: Status: RESOLVED | Noted: 2021-12-09 | Resolved: 2022-08-19

## 2022-08-19 PROBLEM — R10.30 LOWER ABDOMINAL PAIN: Status: RESOLVED | Noted: 2022-02-10 | Resolved: 2022-08-19

## 2022-08-19 PROBLEM — O99.820 GBS (GROUP B STREPTOCOCCUS CARRIER), +RV CULTURE, CURRENTLY PREGNANT: Status: RESOLVED | Noted: 2021-12-09 | Resolved: 2022-08-19

## 2022-08-19 PROBLEM — Z34.80 INTRAUTERINE PREGNANCY IN TEENAGER: Status: RESOLVED | Noted: 2021-12-09 | Resolved: 2022-08-19

## 2022-08-19 LAB — WET PREP GENITAL: ABNORMAL

## 2022-08-19 PROCEDURE — 87210 SMEAR WET MOUNT SALINE/INK: CPT | Performed by: OBSTETRICS & GYNECOLOGY

## 2022-08-19 PROCEDURE — 99213 OFFICE O/P EST LOW 20 MIN: CPT | Performed by: OBSTETRICS & GYNECOLOGY

## 2022-08-19 RX ORDER — METRONIDAZOLE 500 MG/1
500 TABLET ORAL 2 TIMES DAILY
Qty: 14 TABLET | Refills: 1 | Status: SHIPPED | OUTPATIENT
Start: 2022-08-19 | End: 2022-08-26

## 2022-08-19 RX ORDER — NORGESTIMATE AND ETHINYL ESTRADIOL 0.25-0.035
1 KIT ORAL DAILY
Qty: 28 TABLET | Refills: 12 | Status: SHIPPED | OUTPATIENT
Start: 2022-08-19

## 2022-08-19 NOTE — PROGRESS NOTES
Chief Complaint   Patient presents with   • Postpartum Care       6 Week Postpartum Visit         Kristel Fernandez is a 22 y.o.  who presents today for a 6 week postpartum check.  Patient states that she has only bottle-fed.  She had a normal period approximately 2 weeks ago.  Patient desires to start oral contraceptives.    C/S: breech     This patient has no babies on file. This patient has no babies on file.       Baby Discharged: Discharged with Mom  Delivering Physician: Kurt Corea MD    At the time of delivery were you diagnosed with any of the following: None. The incision is healing well. Patient describes vaginal bleeding as absent.  Patient is bottle feeding.  She desires contraceptive methods: OCP (estrogen/progesterone) for contraception.  Patient denies bowel or bladder issues. She does report lingering cough from delivery.       Patient reports postpartum depression. Was treated at 2 week appointment, pt reports improvement in symptoms. Postpartum Depression Screening Questionnaire: 13,  treatment previously started.      Last Completed Pap Smear     This patient has no relevant Health Maintenance data.        Is the patient due for a pap today? N/A    The additional following portions of the patient's history were reviewed and updated as appropriate: allergies, current medications, past family history, past medical history, past social history, past surgical history and problem list.    Review of Systems   Constitutional: Negative.    HENT: Negative.    Eyes: Negative.    Respiratory: Positive for cough.    Cardiovascular: Negative.    Gastrointestinal: Negative.    Endocrine: Negative.    Genitourinary: Negative.    Musculoskeletal: Negative.    Skin: Negative.    Allergic/Immunologic: Negative.    Neurological: Negative.    Hematological: Negative.    Psychiatric/Behavioral: Negative.  Positive for depressed mood.     All other systems reviewed and are negative.     I have  "reviewed and agree with the HPI, ROS, and historical information as entered above. Kurt Corea MD    /70   Ht 149.9 cm (59.02\")   Wt 54.9 kg (121 lb)   BMI 24.42 kg/m²     Physical Exam  Vitals and nursing note reviewed. Exam conducted with a chaperone present.   Constitutional:       Appearance: She is well-developed.   HENT:      Head: Normocephalic and atraumatic.   Pulmonary:      Effort: Pulmonary effort is normal.   Abdominal:      General: A surgical scar is present.      Palpations: Abdomen is soft. Abdomen is not rigid.      Comments: Clean, Dry, and Intact.  No erythema.    Genitourinary:     Vagina: No lesions or prolapsed vaginal walls.      Cervix: No cervical motion tenderness, discharge, lesion or erythema.      Uterus: Not enlarged, not tender and no uterine prolapse.       Adnexa:         Right: No mass, tenderness or fullness.          Left: No mass, tenderness or fullness.        Comments: Heavy yellow discharge present wet mount was positive for clue cells and white cells.  New swab sent.  Musculoskeletal:      Cervical back: Normal range of motion.   Neurological:      Mental Status: She is alert and oriented to person, place, and time.   Psychiatric:         Mood and Affect: Mood normal.         Behavior: Behavior normal.             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    U/s 6/9/22; right dysplastic kidney measures 8/x5x6 cm. LOLI 7.3    Repeat BPP weekly. Continue oral hydration.          Breech presentation    Overview     5/26/22; U/s Breech; EFW 77%; LOLI 7.9    6/9/22; Persistent Breech;  LOLI 7.3. Will schedule c section at 39 wk; " 2022 at 9 AM; 2022 2:15 PM PAT              Delivery by  section using transverse incision of lower segment of uterus - Primary    Overview     2022; primary  for breech presentation.  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  7 pounds 4 ounce.         BV (bacterial vaginosis)    Overview     22; 8 wks post partum; BV on wet prep;   Nuswab sent.   Rx Flagyl 500 mg bid x 7 days.            Other Visit Diagnoses     Postpartum follow-up        Screening examination for STD (sexually transmitted disease)        Relevant Orders    Chlamydia trachomatis, Neisseria gonorrhoeae, PCR - Swab, Cervix          1. S/p , 6 weeks postpartum.  Doing well.    2. Return to normal physical activity.  No pelvic restrictions.   3. Baby doing well.  4. Bottlefeeding going well.  5. No si/sx of postpartum depression  6. Baby doing well no problems with dysplastic fetal kidney.  7. Contraception: contraceptive methods: Desires oral contraceptive.  Rx for Sprintec sent to pharmacy.   8. Vaginal discharge consistent with bacterial vaginosis.  Rx for Flagyl sent to pharmacy.  9. STD testing.  New swab sent for GC chlamydia and trichomonas.  10. Return in about 1 year (around 2023) for Annual physical.     Kurt Corea MD  2022

## 2022-08-22 LAB
C TRACH RRNA SPEC QL NAA+PROBE: NEGATIVE
N GONORRHOEA RRNA SPEC QL NAA+PROBE: NEGATIVE

## 2022-09-08 ENCOUNTER — TELEPHONE (OUTPATIENT)
Dept: OBSTETRICS AND GYNECOLOGY | Facility: CLINIC | Age: 22
End: 2022-09-08

## 2022-09-08 NOTE — TELEPHONE ENCOUNTER
"Pt started her BC pills, she has 4 blue pills left, she states she started her period and wants to make sure that it is okay that she continue to take the BC pills like normal even though she is not on her \"period\" pills.    Please advise   "

## 2022-11-28 ENCOUNTER — TELEPHONE (OUTPATIENT)
Dept: OBSTETRICS AND GYNECOLOGY | Facility: CLINIC | Age: 22
End: 2022-11-28

## 2022-11-28 NOTE — TELEPHONE ENCOUNTER
Attempted to call pt. Unable to lvom due to vm being full.     If patient calls back: patient was recently seen for PP visit on 8/19/2022 and birth control was sent in on that day for 30 day supply with 11 RF's (enough to do her for 1 year)

## 2022-11-28 NOTE — TELEPHONE ENCOUNTER
PT calling because she was told at the pharmacy that she has no more refills on Sprintec and she is requesting refills.

## 2022-12-06 ENCOUNTER — TELEPHONE (OUTPATIENT)
Dept: OBSTETRICS AND GYNECOLOGY | Facility: CLINIC | Age: 22
End: 2022-12-06

## 2022-12-06 NOTE — TELEPHONE ENCOUNTER
Patient started sprintec PP 8/2022. States has been off x3 weeks. She did not have RF at the time she was due to restart pill pack.     Desires to restart but no period yet. Instructed may be off due to missed pills. Suggest waiting 2-3 weeks to see if period starts. If so ok to due Sunday start on pill pack after period. She vu.     If still no period.patient to cb to discuss. NEFTALI may let her go 2weeks without IC, check upt and if negative go ahead and start pills. She vu.

## 2022-12-06 NOTE — TELEPHONE ENCOUNTER
PT STATES SHE HAS BEEN OFF OF HER BIRTH CONTROL FOR A MONTH AND HAS NOT HAD A PERIOD. SHE STATES THAT SHE HAS TAKEN MULTIPLE UPT AND THEY HAVE ALL BEEN NEGATIVE. SHE WOULD LIKE TO SPEAK TO SOMEONE ABOUT THIS BECAUSE SHE IS WORRIED.    PLEASE ADVISE

## 2023-03-15 ENCOUNTER — HOSPITAL ENCOUNTER (EMERGENCY)
Facility: HOSPITAL | Age: 23
Discharge: HOME | End: 2023-03-15
Attending: EMERGENCY MEDICINE | Admitting: EMERGENCY MEDICINE
Payer: COMMERCIAL

## 2023-03-15 VITALS
HEIGHT: 61 IN | OXYGEN SATURATION: 99 % | RESPIRATION RATE: 16 BRPM | SYSTOLIC BLOOD PRESSURE: 123 MMHG | WEIGHT: 121 LBS | DIASTOLIC BLOOD PRESSURE: 101 MMHG | TEMPERATURE: 98.6 F | HEART RATE: 92 BPM | BODY MASS INDEX: 22.84 KG/M2

## 2023-03-15 DIAGNOSIS — A08.4 VIRAL GASTROENTERITIS: Primary | ICD-10-CM

## 2023-03-15 DIAGNOSIS — K29.40 ATROPHIC GASTRITIS WITHOUT HEMORRHAGE: ICD-10-CM

## 2023-03-15 LAB
ALBUMIN SERPL-MCNC: 4.6 G/DL (ref 3.5–5.2)
ALBUMIN/GLOB SERPL: 1.5 G/DL
ALP SERPL-CCNC: 83 U/L (ref 39–117)
ALT SERPL W P-5'-P-CCNC: 8 U/L (ref 1–33)
ANION GAP SERPL CALCULATED.3IONS-SCNC: 10 MMOL/L (ref 5–15)
AST SERPL-CCNC: 17 U/L (ref 1–32)
B-HCG UR QL: NEGATIVE
BACTERIA UR QL AUTO: ABNORMAL /HPF
BASOPHILS # BLD AUTO: 0.03 10*3/MM3 (ref 0–0.2)
BASOPHILS NFR BLD AUTO: 0.4 % (ref 0–1.5)
BILIRUB SERPL-MCNC: 0.7 MG/DL (ref 0–1.2)
BILIRUB UR QL STRIP: NEGATIVE
BUN SERPL-MCNC: 13 MG/DL (ref 6–20)
BUN/CREAT SERPL: 19.4 (ref 7–25)
CALCIUM SPEC-SCNC: 9.2 MG/DL (ref 8.6–10.5)
CHLORIDE SERPL-SCNC: 104 MMOL/L (ref 98–107)
CLARITY UR: ABNORMAL
CO2 SERPL-SCNC: 25 MMOL/L (ref 22–29)
COLOR UR: YELLOW
CREAT SERPL-MCNC: 0.67 MG/DL (ref 0.57–1)
DEPRECATED RDW RBC AUTO: 38.5 FL (ref 37–54)
EGFRCR SERPLBLD CKD-EPI 2021: 128.5 ML/MIN/1.73
EOSINOPHIL # BLD AUTO: 0.05 10*3/MM3 (ref 0–0.4)
EOSINOPHIL NFR BLD AUTO: 0.7 % (ref 0.3–6.2)
ERYTHROCYTE [DISTWIDTH] IN BLOOD BY AUTOMATED COUNT: 12.2 % (ref 12.3–15.4)
EXPIRATION DATE: NORMAL
GLOBULIN UR ELPH-MCNC: 3 GM/DL
GLUCOSE SERPL-MCNC: 100 MG/DL (ref 65–99)
GLUCOSE UR STRIP-MCNC: NEGATIVE MG/DL
HCT VFR BLD AUTO: 44.9 % (ref 34–46.6)
HGB BLD-MCNC: 15.2 G/DL (ref 12–15.9)
HGB UR QL STRIP.AUTO: NEGATIVE
HOLD SPECIMEN: NORMAL
HYALINE CASTS UR QL AUTO: ABNORMAL /LPF
IMM GRANULOCYTES # BLD AUTO: 0.02 10*3/MM3 (ref 0–0.05)
IMM GRANULOCYTES NFR BLD AUTO: 0.3 % (ref 0–0.5)
INTERNAL NEGATIVE CONTROL: NORMAL
INTERNAL POSITIVE CONTROL: NORMAL
KETONES UR QL STRIP: ABNORMAL
LEUKOCYTE ESTERASE UR QL STRIP.AUTO: ABNORMAL
LIPASE SERPL-CCNC: 47 U/L (ref 13–60)
LYMPHOCYTES # BLD AUTO: 0.36 10*3/MM3 (ref 0.7–3.1)
LYMPHOCYTES NFR BLD AUTO: 4.7 % (ref 19.6–45.3)
Lab: NORMAL
MCH RBC QN AUTO: 29.1 PG (ref 26.6–33)
MCHC RBC AUTO-ENTMCNC: 33.9 G/DL (ref 31.5–35.7)
MCV RBC AUTO: 86 FL (ref 79–97)
MONOCYTES # BLD AUTO: 0.39 10*3/MM3 (ref 0.1–0.9)
MONOCYTES NFR BLD AUTO: 5.1 % (ref 5–12)
NEUTROPHILS NFR BLD AUTO: 6.73 10*3/MM3 (ref 1.7–7)
NEUTROPHILS NFR BLD AUTO: 88.8 % (ref 42.7–76)
NITRITE UR QL STRIP: NEGATIVE
NRBC BLD AUTO-RTO: 0 /100 WBC (ref 0–0.2)
PH UR STRIP.AUTO: 6.5 [PH] (ref 5–8)
PLATELET # BLD AUTO: 227 10*3/MM3 (ref 140–450)
PMV BLD AUTO: 10 FL (ref 6–12)
POTASSIUM SERPL-SCNC: 4 MMOL/L (ref 3.5–5.2)
PROT SERPL-MCNC: 7.6 G/DL (ref 6–8.5)
PROT UR QL STRIP: ABNORMAL
RBC # BLD AUTO: 5.22 10*6/MM3 (ref 3.77–5.28)
RBC # UR STRIP: ABNORMAL /HPF
REF LAB TEST METHOD: ABNORMAL
SODIUM SERPL-SCNC: 139 MMOL/L (ref 136–145)
SP GR UR STRIP: >=1.03 (ref 1–1.03)
SQUAMOUS #/AREA URNS HPF: ABNORMAL /HPF
UROBILINOGEN UR QL STRIP: ABNORMAL
WBC # UR STRIP: ABNORMAL /HPF
WBC NRBC COR # BLD: 7.58 10*3/MM3 (ref 3.4–10.8)
WHOLE BLOOD HOLD COAG: NORMAL
WHOLE BLOOD HOLD SPECIMEN: NORMAL

## 2023-03-15 PROCEDURE — 96374 THER/PROPH/DIAG INJ IV PUSH: CPT

## 2023-03-15 PROCEDURE — 25010000002 ONDANSETRON PER 1 MG: Performed by: PHYSICIAN ASSISTANT

## 2023-03-15 PROCEDURE — 83690 ASSAY OF LIPASE: CPT

## 2023-03-15 PROCEDURE — 81001 URINALYSIS AUTO W/SCOPE: CPT

## 2023-03-15 PROCEDURE — 80053 COMPREHEN METABOLIC PANEL: CPT

## 2023-03-15 PROCEDURE — 99283 EMERGENCY DEPT VISIT LOW MDM: CPT

## 2023-03-15 PROCEDURE — 81025 URINE PREGNANCY TEST: CPT

## 2023-03-15 PROCEDURE — 85025 COMPLETE CBC W/AUTO DIFF WBC: CPT

## 2023-03-15 RX ORDER — FAMOTIDINE 20 MG/1
20 TABLET, FILM COATED ORAL 2 TIMES DAILY
Qty: 28 TABLET | Refills: 0 | Status: SHIPPED | OUTPATIENT
Start: 2023-03-15

## 2023-03-15 RX ORDER — ONDANSETRON 4 MG/1
4 TABLET, ORALLY DISINTEGRATING ORAL EVERY 8 HOURS PRN
Qty: 12 TABLET | Refills: 0 | Status: SHIPPED | OUTPATIENT
Start: 2023-03-15

## 2023-03-15 RX ORDER — ONDANSETRON 2 MG/ML
4 INJECTION INTRAMUSCULAR; INTRAVENOUS ONCE
Status: COMPLETED | OUTPATIENT
Start: 2023-03-15 | End: 2023-03-15

## 2023-03-15 RX ORDER — SODIUM CHLORIDE 9 MG/ML
10 INJECTION INTRAVENOUS AS NEEDED
Status: DISCONTINUED | OUTPATIENT
Start: 2023-03-15 | End: 2023-03-15 | Stop reason: HOSPADM

## 2023-03-15 RX ORDER — FAMOTIDINE 10 MG/ML
20 INJECTION, SOLUTION INTRAVENOUS ONCE
Status: DISCONTINUED | OUTPATIENT
Start: 2023-03-15 | End: 2023-03-15

## 2023-03-15 RX ORDER — ONDANSETRON 2 MG/ML
INJECTION INTRAMUSCULAR; INTRAVENOUS
Status: DISCONTINUED
Start: 2023-03-15 | End: 2023-03-15 | Stop reason: HOSPADM

## 2023-03-15 RX ADMIN — ONDANSETRON 4 MG: 2 INJECTION INTRAMUSCULAR; INTRAVENOUS at 13:17

## 2023-03-15 RX ADMIN — SODIUM CHLORIDE 1000 ML: 9 INJECTION, SOLUTION INTRAVENOUS at 11:49

## 2023-03-15 NOTE — ED PROVIDER NOTES
Subjective   History of Present Illness  22-year-old female presents emergency today with a 1 day history of nausea vomiting and GI discomfort.  She reports that she has a burning sensation in her stomach.  She has had no fevers no chills.  Pain does not radiate through to her back.  She has no prior history of gallbladder disease.  She had a previous appendectomy and  in the past.  She had no dysuria frequency urgency or hematuria.  She has really no other complaints.    History provided by:  Patient   used: No    Abdominal Pain  Pain location:  Epigastric and LUQ  Pain quality: burning    Pain radiates to:  Does not radiate  Pain severity:  Moderate  Onset quality:  Gradual  Timing:  Constant  Progression:  Waxing and waning  Chronicity:  New  Context: not diet changes, not eating, not laxative use, not recent illness, not recent travel, not sick contacts and not trauma    Relieved by:  Nothing  Worsened by:  Eating  Ineffective treatments:  None tried  Associated symptoms: nausea and vomiting    Associated symptoms: no anorexia, no chest pain, no chills, no cough, no diarrhea, no dysuria, no fever, no hematemesis, no hematochezia, no hematuria and no melena    Risk factors: no aspirin use, no NSAID use and no recent hospitalization        Review of Systems   Constitutional: Negative for chills and fever.   Respiratory: Negative for cough, chest tightness and wheezing.    Cardiovascular: Negative for chest pain and palpitations.   Gastrointestinal: Positive for abdominal pain, nausea and vomiting. Negative for anorexia, diarrhea, hematemesis, hematochezia and melena.   Genitourinary: Negative for dysuria, hematuria and urgency.   Musculoskeletal: Negative for back pain and neck pain.   Skin: Negative for pallor and rash.   Psychiatric/Behavioral: Negative.    All other systems reviewed and are negative.      Past Medical History:   Diagnosis Date   • Acute cystitis 2021    GBS on  prenatal Urine culture.  Rx amoxicillin.   Went to urgent treatment center and treated with Macrobid and 10 on 2021.  Can discontinue Macrodantin   • Breech presentation 2022; U/s Breech; EFW 77%; LOLI 7.9  22; Persistent Breech;  LOLI 7.3. 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  U/s 22; right dysplastic kidney measures 8/x5x6 cm. LOLI 7.3  Repeat BPP weekly. Continue oral hydration.    • GBS (group B Streptococcus carrier), +RV culture, currently pregnant 2021    Prenatal urine culture positive;  Treat in labor.    • GERD without esophagitis 2022    Rx Pepcid 20 mg po bid.    • Pregnancy 2021    Dates by 9 1/7 wk u/s Cell free DNA screen low risk; consistent with female fetus. aFP screen was negative. Unilateral Multicystic dysplastic kidney.  Breech; 2022 at 9 AM; 2022 2:15 PM PAT        No Known Allergies    Past Surgical History:   Procedure Laterality Date   • APPENDECTOMY     •  SECTION N/A 2022    Procedure:  SECTION PRIMARY BREECH;  Surgeon: Kurt Corea MD;  Location: [facility] LABOR DELIVERY;  Service: Obstetrics/Gynecology;  Laterality: N/A;   • EAR TUBES         Family History   Problem Relation Age of Onset   • Diabetes Maternal Aunt    • Diabetes Maternal Grandmother    • Hypertension Maternal Grandmother        Social History     Socioeconomic History   • Marital status: Single   Tobacco Use   • Smoking status: Never   • Smokeless tobacco: Never   Vaping Use   • Vaping Use: Never used   Substance and Sexual Activity   • Alcohol use: Never   • Drug use: Yes     Types: Marijuana      "Comment: LAST USE 2021   • Sexual activity: Yes           Objective   Physical Exam  Vitals and nursing note reviewed.   Constitutional:       General: She is not in acute distress.     Appearance: She is well-developed. She is not diaphoretic.   HENT:      Head: Normocephalic and atraumatic.      Nose: Nose normal.   Eyes:      General: No scleral icterus.     Conjunctiva/sclera: Conjunctivae normal.   Cardiovascular:      Rate and Rhythm: Normal rate and regular rhythm.      Heart sounds: Normal heart sounds. No murmur heard.  Pulmonary:      Effort: Pulmonary effort is normal. No respiratory distress.      Breath sounds: Normal breath sounds.   Abdominal:      General: Bowel sounds are normal.      Palpations: Abdomen is soft.      Tenderness: There is abdominal tenderness in the epigastric area and left upper quadrant.   Musculoskeletal:         General: Normal range of motion.      Cervical back: Normal range of motion and neck supple.   Skin:     General: Skin is warm and dry.   Neurological:      Mental Status: She is alert and oriented to person, place, and time.   Psychiatric:         Behavior: Behavior normal.         Procedures           ED Course                No results found for this or any previous visit (from the past 24 hour(s)).  Note: In addition to lab results from this visit, the labs listed above may include labs taken at another facility or during a different encounter within the last 24 hours. Please correlate lab times with ED admission and discharge times for further clarification of the services performed during this visit.    No orders to display     Vitals:    03/15/23 1002 03/15/23 1300   BP: (!) 123/101    BP Location: Left arm    Patient Position: Sitting    Pulse: 117 92   Resp: 18 16   Temp: 98.6 °F (37 °C)    TempSrc: Oral    SpO2: 99% 99%   Weight: 54.9 kg (121 lb)    Height: 154.9 cm (61\")      Medications   ondansetron (ZOFRAN) injection 4 mg (4 mg Intravenous Given 3/15/23 " 1317)   sodium chloride 0.9 % bolus 1,000 mL (0 mL Intravenous Stopped 3/15/23 1313)     ECG/EMG Results (last 24 hours)     ** No results found for the last 24 hours. **        No orders to display                                    Medical Decision Making  Woke up with several hour history of nausea vomiting burning sensation in the epigastrium.  Feels better after Zofran.  Laboratory data is unremarkable.  I will give her Zofran for home.  Differential diagnosis #1 viral gastroenteritis #2 GERD #3 peptic ulcer disease #4 cholecystitis    Atrophic gastritis without hemorrhage: acute illness or injury  Viral gastroenteritis: acute illness or injury  Risk  Prescription drug management.          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     ondansetron ODT 4 MG disintegrating tablet  Commonly known as: ZOFRAN-ODT  Place 1 tablet on the tongue Every 8 (Eight) Hours As Needed for Nausea.           Where to Get Your Medications      These medications were sent to [id number] - [place], [place] - [address] - [phone]  - [phone] FX  [address] [place] [address]    Phone: [phone]   · famotidine 20 MG tablet  · ondansetron ODT 4 MG disintegrating tablet          Kurt Rodriguez PA  03/16/23 1952

## 2023-06-06 NOTE — TELEPHONE ENCOUNTER
Patient is calling to get refill of Zoloft 50mg. Patient states she didn't know she had to call her own refills in.   Patient states she really needs it. Pharmacy has been verified.

## 2023-07-24 ENCOUNTER — TELEPHONE (OUTPATIENT)
Dept: OBSTETRICS AND GYNECOLOGY | Facility: CLINIC | Age: 23
End: 2023-07-24
Payer: COMMERCIAL

## 2023-07-24 NOTE — TELEPHONE ENCOUNTER
Caller: Kristel Fernandez    Relationship to patient: Self    Best call back number: [phone]        PT IS ON ZOLOFT, SHE STATES SHE IS PARANOID FOR A FEW WEEKS.  SHE IS HAVING FEAR.  PT STATES ZOLOFT ONLY WORKED FOR A MONTH.  NO SIGNS OF HARMING HERSELF OR OTHERS AT THIS TIME.  PT WOULD LIKE TO KNOW WHAT YOU ADVISE.  PLEASE CALL PT BACK AT ANYTIME AND CAN LVM.    
    Caller: Kristel Fernandez    Relationship to patient: Self    Best call back number: [phone]    Patient is needing: PT WAS RETURNING CALL.  PLEASE CALL BACK AS SOON AS POSSIBLE         
"Returned patient's call. Reports having paranoia and fear that something bad is going to happen. Denies any SI/HI. She was put on Zoloft for PP depression after she delivered in June 2022. No hx depression prior to pregnancy. Did have some anxiety prior to pregnancy but was never treated. States she did well on the Zoloft and stopped it. She restarted the Zoloft almost 2 months ago because she was \"feeling really bad\". States symptoms were \"a lot worse\" then than they are now.  States she called a PCP today to try to establish care but they can't see her until 8/14/23 and told her to call our office.   Discussed with SANGEETHA Guardado. She states that paranoia is not something we would be able to manage and recommends patient go to the [facility] for behavioral health.   Left voice message for patient to call us back.   "
Returned patient's call. Informed her of MEENA Vincent's recommendation. Patient v/u and agreed. Address and phone number for [facility] given.   
never

## 2023-08-14 RX ORDER — NORGESTIMATE AND ETHINYL ESTRADIOL 0.25-0.035
KIT ORAL
Qty: 84 TABLET | Refills: 4 | OUTPATIENT
Start: 2023-08-14

## 2023-09-25 ENCOUNTER — TELEPHONE (OUTPATIENT)
Dept: OBSTETRICS AND GYNECOLOGY | Facility: CLINIC | Age: 23
End: 2023-09-25

## 2023-09-25 NOTE — TELEPHONE ENCOUNTER
Caller: Kristel Fernandez    Relationship to patient: Self    Best call back number: [phone] (home)  CAN CALL BACK AND LVM BEFORE 7PM        Type of visit: NEW OB W U/S    Requested date: WHEN AVAILABLE       Additional notes:POS PREG TEST WITH LMP 08-23

## 2023-10-19 ENCOUNTER — TELEPHONE (OUTPATIENT)
Dept: OBSTETRICS AND GYNECOLOGY | Facility: CLINIC | Age: 23
End: 2023-10-19
Payer: COMMERCIAL

## 2023-11-08 ENCOUNTER — INITIAL PRENATAL (OUTPATIENT)
Dept: OBSTETRICS AND GYNECOLOGY | Facility: CLINIC | Age: 23
End: 2023-11-08
Payer: COMMERCIAL

## 2023-11-08 VITALS — SYSTOLIC BLOOD PRESSURE: 126 MMHG | WEIGHT: 120.8 LBS | BODY MASS INDEX: 22.82 KG/M2 | DIASTOLIC BLOOD PRESSURE: 68 MMHG

## 2023-11-08 DIAGNOSIS — Z12.4 SCREENING FOR CERVICAL CANCER: ICD-10-CM

## 2023-11-08 DIAGNOSIS — Z11.3 SCREENING EXAMINATION FOR STD (SEXUALLY TRANSMITTED DISEASE): ICD-10-CM

## 2023-11-08 DIAGNOSIS — Z34.91 PRENATAL CARE IN FIRST TRIMESTER: ICD-10-CM

## 2023-11-08 DIAGNOSIS — B96.89 BV (BACTERIAL VAGINOSIS): ICD-10-CM

## 2023-11-08 DIAGNOSIS — N76.0 BV (BACTERIAL VAGINOSIS): ICD-10-CM

## 2023-11-08 DIAGNOSIS — N89.8 VAGINAL DISCHARGE: ICD-10-CM

## 2023-11-08 DIAGNOSIS — F41.1 GENERALIZED ANXIETY DISORDER: ICD-10-CM

## 2023-11-08 DIAGNOSIS — Z34.90 PREGNANCY, UNSPECIFIED GESTATIONAL AGE: ICD-10-CM

## 2023-11-08 DIAGNOSIS — O09.299 HISTORY OF FETAL ANOMALY IN PRIOR PREGNANCY, CURRENTLY PREGNANT: ICD-10-CM

## 2023-11-08 DIAGNOSIS — Z11.8 SCREENING FOR CHLAMYDIAL DISEASE: ICD-10-CM

## 2023-11-08 DIAGNOSIS — O34.219 PREVIOUS CESAREAN SECTION COMPLICATING PREGNANCY: Primary | ICD-10-CM

## 2023-11-08 RX ORDER — PRENATAL VIT,CAL 76/IRON/FOLIC 29 MG-1 MG
1 TABLET ORAL DAILY
Qty: 30 TABLET | Refills: 11 | Status: SHIPPED | OUTPATIENT
Start: 2023-11-08 | End: 2024-11-07

## 2023-11-08 NOTE — PROGRESS NOTES
Initial ob visit     CC- Here for care of pregnancy        Kristel Fernandez is a 23 y.o. female, , who presents for her first obstetrical visit.  Her last LMP was Patient's last menstrual period was 2023.. Her MATI is 2024, by Last Menstrual Period. Current GA is 11w0d.     Initial positive test date : 23, UPT        Her periods are: regular when not pregnant  Prior obstetric issues: congential renal disease with first pregnancy, breech  Patient's past medical history is significant for:  History of anxiety and postpartum depression.  Currently stable off medication. .  Family history of genetic issues (includes FOB): no  Prior infections concerning in pregnancy (Rash, fever in last 2 weeks): No  Varicella Hx - has never had the infection nor the vaccine  Prior testing for Cystic Fibrosis Carrier or Sickle Cell Trait- no  Prepregnancy BMI - Body mass index is 22.82 kg/m².  History of STD: yes  trich  Hx of HSV for patient or partner: no  Ultrasound Today: yes    OB History    Para Term  AB Living   2 1 1 0 0 1   SAB IAB Ectopic Molar Multiple Live Births   0 0 0 0 0 1      # Outcome Date GA Lbr Cam/2nd Weight Sex Delivery Anes PTL Lv   2 Current            1 Term 22 39w0d  3285 g (7 lb 3.9 oz) F CS-LTranv Spinal N LUIS       Additional Pertinent History   Last Pap : none       Last Completed Pap Smear       This patient has no relevant Health Maintenance data.          History of abnormal Pap smear:  n/a  Family history of uterine, colon, breast, or ovarian cancer: no  Feelings of Anxiety or Depression: yes - anxiety  Tobacco Usage?: No   Alcohol/Drug Use?: NO  Over the age of 35 at delivery: no  Desires Genetic Screening: maternity 23 with gender  Flu Status: Will give in office today    PMH    Current Outpatient Medications:     Misc. Devices (Breast Pump) misc, Use PRN (Patient not taking: Reported on 2023), Disp: 1 each, Rfl: 0    prenatal vitamins  (PRENATABS RX) 29-1 MG tablet tablet, Take 1 tablet by mouth Daily., Disp: 30 tablet, Rfl: 11     Past Medical History:   Diagnosis Date    Acute cystitis 2021    GBS on prenatal Urine culture.  Rx amoxicillin.   Went to urgent treatment center and treated with Macrobid and 10 on 2021.  Can discontinue Macrodantin    Breech presentation 2022; U/s Breech; EFW 77%; LOLI 7.9  22; Persistent Breech;  LOLI 7.3. Will schedule c section at 39 wk; 2022 at 9 AM; 2022 2:15 PM PAT       Fetal renal anomaly, single gestation 02/10/2022    Anomaly scan 2/10/22; multicystic right kidney;   [facility] u/s c/w right sided unilateral multicystic dysplastic kidney> f/u  [facility];  Ultrasound 2022 at [facility] showed normal fetal growth at 64 percentile and stable fetal dysplastic kidney measuring 7 x 5 cm.  U/s 22; right dysplastic kidney measures 8/x5x6 cm. LOLI 7.3  Repeat BPP weekly. Continue oral hydration.     GERD without esophagitis 2022    Rx Pepcid 20 mg po bid.         Past Surgical History:   Procedure Laterality Date    APPENDECTOMY       SECTION N/A 2022    Procedure:  SECTION PRIMARY BREECH;  Surgeon: Kurt Corea MD;  Location: [facility] LABOR DELIVERY;  Service: Obstetrics/Gynecology;  Laterality: N/A;    EAR TUBES         Review of Systems   Review of Systems   Constitutional:  Negative for chills and fever.   Respiratory: Negative.     Cardiovascular: Negative.    Gastrointestinal:  Negative for abdominal distention, abdominal pain and vomiting.   Genitourinary: Negative.    Psychiatric/Behavioral:  Negative for dysphoric mood and depressed mood.        Patient Reports: Nausea, Spotting, and Cramping  Patient Denies: Nausea and vomiting, Heavy bleeding, and Fatigue  All systems reviewed and otherwise normal.    I have reviewed and agree with the HPI, ROS, and historical information as entered above. Kurt Corea MD    /68   Wt 54.8 kg  (120 lb 12.8 oz) Comment: patient does not know prepregnancy weight  LMP 2023   BMI 22.82 kg/m²     The additional following portions of the patient's history were reviewed and updated as appropriate: allergies, current medications, past family history, past medical history, past social history, past surgical history, and problem list.    Physical Exam  General:  well developed; well nourished  no acute distress   Chest/Respiratory: No labored breathing, normal respiratory effort, normal appearance, no respiratory noises noted  CHEST/RESPIRATORY: clear to auscultation, no wheezes, rales, or rhonchi, no tachypnea, retractions, or cyanosis   Heart:  normal rate, regular rhythm,  no murmurs, rubs, or gallops   Thyroid: normal to inspection and palpation   Breasts:  Not performed.   Abdomen: soft, non-tender; no masses  no umbilical or inguinal hernias are present  no hepato-splenomegaly   Pelvis: Clinical staff was present for exam  External genitalia:  normal appearance of the external genitalia including Bartholin's and Wikieup's glands.  :  urethral meatus normal;  Vaginal:  normal pink mucosa without prolapse or lesions. discharge present -  yellow;  Cervix:  normal appearance.  Uterus:  asymmetrically enlarged, consistent with 12 weeks size;  Adnexa:  normal bimanual exam of the adnexa.        Assessment and Plan    Problem List Items Addressed This Visit       Previous  section complicating pregnancy - Primary    Overview     2022; primary  for breech presentation.  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  7 pounds 4 ounce.         BV (bacterial vaginosis)    Overview     22; 8 wks post partum; BV on wet prep;   Nuswab sent.   Rx Flagyl 500 mg bid x 7 days.          Screening for cervical cancer    Overview     2023 Pap with HPV         Relevant Orders    LIQUID-BASED PAP SMEAR WITH HPV GENOTYPING IF ASCUS (TESSIE,COR,MAD)    Pregnancy    Overview     Dates  consistent with 11-4/7-week u/s  Prior  for breech.  History of fetal renal multicystic dysplasia right kidney.  Girl.  7 pounds 4 ounce.            Generalized anxiety disorder     Other Visit Diagnoses       Prenatal care in first trimester        Relevant Orders    Obstetric Panel    HIV-1 / O / 2 Ag / Antibody    Urine Culture - Urine, Urine, Clean Catch    Urinalysis With Microscopic - Urine, Clean Catch    Chlamydia trachomatis, Neisseria gonorrhoeae, PCR - Urine, Urine, Clean Catch    Urine Drug Screen - Urine, Clean Catch    LIQUID-BASED PAP SMEAR WITH HPV GENOTYPING IF ASCUS (TESSIE,COR,MAD)    Fluzone (or Fluarix & Flulaval for VFC) >6 Mos ([id number]) (Completed)    Screening for chlamydial disease                Pregnancy at 11w0d; no free DNA screen done today.  Viable IUP on 11 and 4-week ultrasound today.  STD screening.  Patient states partner may have been exposed to unknown STD.  Vaginal discharge.  BV and trichomoniasis screen sent on Pap smear.  Reining for cervical cancer.  First Pap smear done today.  History of prior  for breech.  History of prior pregnancy with fetal renal abnormality.  Rx for prenatal vitamins sent to pharmacy.  History of depression and anxiety.  Stable off medication.  Took Zoloft in the past; patient will call if wishes to restart.  Reviewed routine prenatal care with the office and educational materials given  Lab(s) Ordered  Discussed options for genetic testing including first trimester nuchal translucency screen, genetic disease carrier testing, quadruple screen, and NIPT  Medication(s) Ordered  Nausea/Vomiting - she does not desire medications at this time.  Discussed conservative ways to help with nausea.  Patient is on Prenatal vitamins  Return in about 4 weeks (around 2023) for Next scheduled follow up.      Marilia Merida RN  2023

## 2023-11-09 LAB
ABO GROUP BLD: NORMAL
AMPHETAMINES UR QL SCN: NEGATIVE NG/ML
APPEARANCE UR: ABNORMAL
BACTERIA #/AREA URNS HPF: ABNORMAL /[HPF]
BARBITURATES UR QL SCN: NEGATIVE NG/ML
BASOPHILS # BLD AUTO: 0 X10E3/UL (ref 0–0.2)
BASOPHILS NFR BLD AUTO: 1 %
BENZODIAZ UR QL SCN: NEGATIVE NG/ML
BILIRUB UR QL STRIP: NEGATIVE
BLD GP AB SCN SERPL QL: NEGATIVE
BZE UR QL SCN: NEGATIVE NG/ML
CANNABINOIDS UR QL SCN: NEGATIVE NG/ML
CASTS URNS QL MICRO: ABNORMAL /LPF
COLOR UR: YELLOW
CREAT UR-MCNC: 103.6 MG/DL (ref 20–300)
EOSINOPHIL # BLD AUTO: 0.1 X10E3/UL (ref 0–0.4)
EOSINOPHIL NFR BLD AUTO: 1 %
EPI CELLS #/AREA URNS HPF: ABNORMAL /HPF (ref 0–10)
ERYTHROCYTE [DISTWIDTH] IN BLOOD BY AUTOMATED COUNT: 13 % (ref 11.7–15.4)
GLUCOSE UR QL STRIP: NEGATIVE
HBV SURFACE AG SERPL QL IA: NEGATIVE
HCT VFR BLD AUTO: 38.7 % (ref 34–46.6)
HCV IGG SERPL QL IA: NON REACTIVE
HGB BLD-MCNC: 13.2 G/DL (ref 11.1–15.9)
HGB UR QL STRIP: NEGATIVE
HIV 1+2 AB+HIV1 P24 AG SERPL QL IA: NON REACTIVE
IMM GRANULOCYTES # BLD AUTO: 0 X10E3/UL (ref 0–0.1)
IMM GRANULOCYTES NFR BLD AUTO: 0 %
KETONES UR QL STRIP: NEGATIVE
LABORATORY COMMENT REPORT: NORMAL
LEUKOCYTE ESTERASE UR QL STRIP: ABNORMAL
LYMPHOCYTES # BLD AUTO: 1.6 X10E3/UL (ref 0.7–3.1)
LYMPHOCYTES NFR BLD AUTO: 25 %
MCH RBC QN AUTO: 29.2 PG (ref 26.6–33)
MCHC RBC AUTO-ENTMCNC: 34.1 G/DL (ref 31.5–35.7)
MCV RBC AUTO: 86 FL (ref 79–97)
METHADONE UR QL SCN: NEGATIVE NG/ML
MICRO URNS: ABNORMAL
MONOCYTES # BLD AUTO: 0.4 X10E3/UL (ref 0.1–0.9)
MONOCYTES NFR BLD AUTO: 6 %
NEUTROPHILS # BLD AUTO: 4.3 X10E3/UL (ref 1.4–7)
NEUTROPHILS NFR BLD AUTO: 67 %
NITRITE UR QL STRIP: NEGATIVE
OPIATES UR QL SCN: NEGATIVE NG/ML
OXYCODONE+OXYMORPHONE UR QL SCN: NEGATIVE NG/ML
PCP UR QL: NEGATIVE NG/ML
PH UR STRIP: 6.5 [PH] (ref 5–7.5)
PH UR: 6.6 [PH] (ref 4.5–8.9)
PLATELET # BLD AUTO: 208 X10E3/UL (ref 150–450)
PROPOXYPH UR QL SCN: NEGATIVE NG/ML
PROT UR QL STRIP: NEGATIVE
RBC # BLD AUTO: 4.52 X10E6/UL (ref 3.77–5.28)
RBC #/AREA URNS HPF: ABNORMAL /HPF (ref 0–2)
RH BLD: POSITIVE
RPR SER QL: NON REACTIVE
RUBV IGG SERPL IA-ACNC: 3.96 INDEX
SP GR UR STRIP: 1.02 (ref 1–1.03)
UROBILINOGEN UR STRIP-MCNC: 1 MG/DL (ref 0.2–1)
WBC # BLD AUTO: 6.5 X10E3/UL (ref 3.4–10.8)
WBC #/AREA URNS HPF: ABNORMAL /HPF (ref 0–5)

## 2023-11-10 LAB
C TRACH RRNA SPEC QL NAA+PROBE: NEGATIVE
N GONORRHOEA RRNA SPEC QL NAA+PROBE: NEGATIVE

## 2023-11-11 LAB
BACTERIA UR CULT: NORMAL
BACTERIA UR CULT: NORMAL

## 2023-11-15 LAB — REF LAB TEST METHOD: NORMAL

## 2023-11-17 ENCOUNTER — TELEPHONE (OUTPATIENT)
Dept: OBSTETRICS AND GYNECOLOGY | Facility: CLINIC | Age: 23
End: 2023-11-17
Payer: COMMERCIAL

## 2023-11-17 PROBLEM — R87.612 LGSIL ON PAP SMEAR OF CERVIX: Status: ACTIVE | Noted: 2023-11-17

## 2023-11-17 RX ORDER — METRONIDAZOLE 500 MG/1
500 TABLET ORAL 2 TIMES DAILY
Qty: 14 TABLET | Refills: 0 | Status: SHIPPED | OUTPATIENT
Start: 2023-11-17 | End: 2023-11-24

## 2023-11-17 RX ORDER — METRONIDAZOLE 500 MG/1
1000 TABLET ORAL 2 TIMES DAILY
Qty: 4 TABLET | Refills: 1 | Status: SHIPPED | OUTPATIENT
Start: 2023-11-17 | End: 2023-11-18

## 2023-11-17 NOTE — TELEPHONE ENCOUNTER
Pap smear 11/8/2023 with LGSIL.  Positive for trichomonas.and BV  Needs colposcopy at next OB visit.   Rx flagyl sent to pharmacy.   Can treat partner with refill one day Rx.

## 2023-11-27 ENCOUNTER — TELEPHONE (OUTPATIENT)
Dept: OBSTETRICS AND GYNECOLOGY | Facility: CLINIC | Age: 23
End: 2023-11-27
Payer: COMMERCIAL

## 2023-11-27 NOTE — TELEPHONE ENCOUNTER
Returned patient's call. She was taking Zoloft 50 mg once daily for anxiety and depression but stopped with pregnancy dx but would like to restart it for worsened anxiety. Denies any thoughts of harming self or others. Discussed with Dr. Corea. Informed patient he stated it is okay for her to resume taking the Zoloft. She v/u and agreed.

## 2023-11-27 NOTE — TELEPHONE ENCOUNTER
Caller: Kristel Fernandez    Relationship: Self    Best call back number: [phone]    Which medication are you concerned about: SERTRALINE (GENERIC FOR ZOLOFT)    Who prescribed you this medication: DR FERNANDEZ    When did you start taking this medication: PREVIOUSLY PRESCRIBED TO THIS MEDICATION FOR DEPRESSION AND ANXIETY, BUT STOPPED IT ABOUT TWO MONTHS AGO BECAUSE SHE IS PREGNANT    What are your concerns: PATIENT IS STARTING TO HAVE MORE DEPRESSION AND ANXIETY     PATIENT WOULD LIKE TO KNOW IF IT IS SAFE TO RESUME THIS MEDICATION, OR IF THERE IS SOMETHING ELSE SHE CAN TAKE INSTEAD    PHARMACY: [facility], [address], [place], [place]

## 2023-12-13 ENCOUNTER — ROUTINE PRENATAL (OUTPATIENT)
Dept: OBSTETRICS AND GYNECOLOGY | Facility: CLINIC | Age: 23
End: 2023-12-13
Payer: COMMERCIAL

## 2023-12-13 VITALS — BODY MASS INDEX: 23.39 KG/M2 | WEIGHT: 123.8 LBS | SYSTOLIC BLOOD PRESSURE: 102 MMHG | DIASTOLIC BLOOD PRESSURE: 58 MMHG

## 2023-12-13 DIAGNOSIS — F41.1 GENERALIZED ANXIETY DISORDER: ICD-10-CM

## 2023-12-13 DIAGNOSIS — Z12.4 SCREENING FOR CERVICAL CANCER: ICD-10-CM

## 2023-12-13 DIAGNOSIS — A64 STD (SEXUALLY TRANSMITTED DISEASE) COMPLICATING PREGNANCY, ANTEPARTUM: ICD-10-CM

## 2023-12-13 DIAGNOSIS — O98.319 STD (SEXUALLY TRANSMITTED DISEASE) COMPLICATING PREGNANCY, ANTEPARTUM: ICD-10-CM

## 2023-12-13 DIAGNOSIS — Z34.90 PREGNANCY, UNSPECIFIED GESTATIONAL AGE: ICD-10-CM

## 2023-12-13 DIAGNOSIS — R87.612 LGSIL ON PAP SMEAR OF CERVIX: ICD-10-CM

## 2023-12-13 DIAGNOSIS — O09.299 HISTORY OF FETAL ANOMALY IN PRIOR PREGNANCY, CURRENTLY PREGNANT: ICD-10-CM

## 2023-12-13 DIAGNOSIS — A59.01 TRICHOMONAL VAGINITIS: ICD-10-CM

## 2023-12-13 DIAGNOSIS — Z34.92 PRENATAL CARE IN SECOND TRIMESTER: Primary | ICD-10-CM

## 2023-12-13 LAB
GLUCOSE UR STRIP-MCNC: NEGATIVE MG/DL
PROT UR STRIP-MCNC: NEGATIVE MG/DL

## 2023-12-13 NOTE — PROGRESS NOTES
OB FOLLOW UP  CC- Here for care of pregnancy        Kristel Fernandez is a 23 y.o.  16w4d patient being seen today for her obstetrical follow up visit. Patient reports cramping, burning,  and soreness in abdomen. She reports severe headaches, does not take Tylenol. She needs prescription for prenatal vitamins.     Her prenatal care is complicated by (and status) :   Patient Active Problem List   Diagnosis    Trichomonal vaginitis    Vaginal discharge    Previous  section complicating pregnancy    BV (bacterial vaginosis)    Screening for cervical cancer    Pregnancy    Generalized anxiety disorder    History of fetal anomaly in prior pregnancy, currently pregnant    LGSIL on Pap smear of cervix       Flu Status: no flu vaccines currently available  Ultrasound Today: No    AFP: desires    ROS -   Patient Reports : see above  Patient Denies: Loss of Fluid, Vaginal Spotting, Vision Changes, Nausea , Vomiting , Contractions, and Epigastric pain  Fetal Movement : too soon for movement  All other systems reviewed and are negative.       The additional following portions of the patient's history were reviewed and updated as appropriate: allergies, current medications, past family history, past medical history, past social history, past surgical history, and problem list.      I have reviewed and agree with the HPI, ROS, and historical information as entered above.   Kurt Corea MD          EXAM:     Prenatal Vitals  BP: 102/58  Weight: 56.2 kg (123 lb 12.8 oz)   Fetal Heart Rate: 160   Pelvic Exam:    Yellow vaginal discharge present.    Urine Glucose Read-only: Negative  Urine Protein Read-only: Negative           Assessment and Plan    Problem List Items Addressed This Visit              Generalized anxiety disorder    Overview     2023.  Currently stable off medication.  Has responded to Zoloft in the past         History of fetal anomaly in prior pregnancy, currently pregnant     Overview     G1; H/o baby-fetal renal anomaly-unilateral multicystic dysplastic kidney         Relevant Orders    US Ob 14 + Weeks Single or First Gestation    LGSIL on Pap smear of cervix    Overview     Pap smear 2023 with LGSIL.  Positive for trichomonas.and BV  NEEDS COLPO         Pregnancy    Overview     Dates consistent with -week u/s  Prior  for breech.  History of fetal renal multicystic dysplasia right kidney.  Girl.  7 pounds 4 ounce.   ; Cell free DNA screen low risk; c/w male.           Relevant Orders    US Ob 14 + Weeks Single or First Gestation    Trichomonal vaginitis    Overview     2023; on Pap smear at prenatal visit.   Rx Flagy; treat partner.   Needs PRIYANKA in 4 wk. done 2023            Other    Screening for cervical cancer    Overview     2023 Pap with HPV done.  Pap smear 2023 with LGSIL.  Positive for trichomonas.and BV  NEEDS COLPO           Other Visit Diagnoses       Prenatal care in second trimester    -  Primary    Relevant Orders    POC Urinalysis Dipstick (Completed)    Alpha Fetoprotein, Maternal    STD (sexually transmitted disease) complicating pregnancy, antepartum        Relevant Orders    NuSwab VG+, Candida 6sp            Pregnancy at 16w4d; AFP screen today.  Anatomy scan ultrasound next visit.  Test of cure for BV and trichomonas.  Patient states symptoms have improved.  Pap smear with LGSIL.  Needs colposcopy next visit.  Fetal status reassuring.   Counseled on MSAFP alone in relation to OTD and placental issues.    Anatomy scan next visit.   Activity and Exercise discussed.  Patient is on Prenatal vitamins  Return in about 4 weeks (around 1/10/2024) for Anomaly scan ultrasound.    Kurt Corea MD  2023

## 2023-12-15 LAB
AFP INTERP SERPL-IMP: NORMAL
AFP INTERP SERPL-IMP: NORMAL
AFP MOM SERPL: 1.14
AFP SERPL-MCNC: 47.7 NG/ML
AGE AT DELIVERY: 22 YR
GA METHOD: NORMAL
GA: 16.6 WEEKS
IDDM PATIENT QL: NO
LABORATORY COMMENT REPORT: NORMAL
MULTIPLE PREGNANCY: NO
NEURAL TUBE DEFECT RISK FETUS: 7850 %
RESULT: NORMAL

## 2023-12-19 ENCOUNTER — HOSPITAL ENCOUNTER (EMERGENCY)
Facility: HOSPITAL | Age: 23
Discharge: HOME | End: 2023-12-19
Attending: EMERGENCY MEDICINE
Payer: COMMERCIAL

## 2023-12-19 VITALS
RESPIRATION RATE: 18 BRPM | WEIGHT: 123 LBS | HEIGHT: 62 IN | DIASTOLIC BLOOD PRESSURE: 71 MMHG | SYSTOLIC BLOOD PRESSURE: 118 MMHG | BODY MASS INDEX: 22.63 KG/M2 | OXYGEN SATURATION: 100 % | HEART RATE: 74 BPM | TEMPERATURE: 97.7 F

## 2023-12-19 DIAGNOSIS — R42 LIGHTHEADEDNESS: ICD-10-CM

## 2023-12-19 DIAGNOSIS — R55 SYNCOPE AND COLLAPSE: ICD-10-CM

## 2023-12-19 DIAGNOSIS — R42 ORTHOSTATIC DIZZINESS: ICD-10-CM

## 2023-12-19 DIAGNOSIS — Z34.92 SECOND TRIMESTER PREGNANCY: Primary | ICD-10-CM

## 2023-12-19 LAB
A VAGINAE DNA VAG QL NAA+PROBE: ABNORMAL SCORE
ALBUMIN SERPL-MCNC: 4.1 G/DL (ref 3.5–5.2)
ALBUMIN/GLOB SERPL: 1.3 G/DL
ALP SERPL-CCNC: 74 U/L (ref 39–117)
ALT SERPL W P-5'-P-CCNC: 7 U/L (ref 1–33)
ANION GAP SERPL CALCULATED.3IONS-SCNC: 15 MMOL/L (ref 5–15)
AST SERPL-CCNC: 15 U/L (ref 1–32)
BASOPHILS # BLD AUTO: 0.02 10*3/MM3 (ref 0–0.2)
BASOPHILS NFR BLD AUTO: 0.2 % (ref 0–1.5)
BILIRUB SERPL-MCNC: 0.5 MG/DL (ref 0–1.2)
BUN SERPL-MCNC: 7 MG/DL (ref 6–20)
BUN/CREAT SERPL: 13.2 (ref 7–25)
BVAB2 DNA VAG QL NAA+PROBE: ABNORMAL SCORE
C ALBICANS DNA VAG QL NAA+PROBE: POSITIVE
C GLABRATA DNA VAG QL NAA+PROBE: NEGATIVE
C KRUSEI DNA VAG QL NAA+PROBE: NEGATIVE
C LUSITANIAE DNA VAG QL NAA+PROBE: NEGATIVE
C TRACH DNA VAG QL NAA+PROBE: NEGATIVE
CALCIUM SPEC-SCNC: 8.9 MG/DL (ref 8.6–10.5)
CANDIDA DNA VAG QL NAA+PROBE: NEGATIVE
CHLORIDE SERPL-SCNC: 103 MMOL/L (ref 98–107)
CO2 SERPL-SCNC: 20 MMOL/L (ref 22–29)
CREAT SERPL-MCNC: 0.53 MG/DL (ref 0.57–1)
DEPRECATED RDW RBC AUTO: 40.2 FL (ref 37–54)
EGFRCR SERPLBLD CKD-EPI 2021: 135.1 ML/MIN/1.73
EOSINOPHIL # BLD AUTO: 0.04 10*3/MM3 (ref 0–0.4)
EOSINOPHIL NFR BLD AUTO: 0.4 % (ref 0.3–6.2)
ERYTHROCYTE [DISTWIDTH] IN BLOOD BY AUTOMATED COUNT: 13 % (ref 12.3–15.4)
GLOBULIN UR ELPH-MCNC: 3.1 GM/DL
GLUCOSE SERPL-MCNC: 72 MG/DL (ref 65–99)
HCG INTACT+B SERPL-ACNC: NORMAL MIU/ML
HCT VFR BLD AUTO: 38.6 % (ref 34–46.6)
HGB BLD-MCNC: 13.5 G/DL (ref 12–15.9)
HOLD SPECIMEN: NORMAL
HOLD SPECIMEN: NORMAL
IMM GRANULOCYTES # BLD AUTO: 0.07 10*3/MM3 (ref 0–0.05)
IMM GRANULOCYTES NFR BLD AUTO: 0.7 % (ref 0–0.5)
INR PPP: 1.01 (ref 0.89–1.12)
LYMPHOCYTES # BLD AUTO: 1.32 10*3/MM3 (ref 0.7–3.1)
LYMPHOCYTES NFR BLD AUTO: 13.4 % (ref 19.6–45.3)
MCH RBC QN AUTO: 30.2 PG (ref 26.6–33)
MCHC RBC AUTO-ENTMCNC: 35 G/DL (ref 31.5–35.7)
MCV RBC AUTO: 86.4 FL (ref 79–97)
MEGA1 DNA VAG QL NAA+PROBE: ABNORMAL SCORE
MONOCYTES # BLD AUTO: 0.4 10*3/MM3 (ref 0.1–0.9)
MONOCYTES NFR BLD AUTO: 4.1 % (ref 5–12)
N GONORRHOEA DNA VAG QL NAA+PROBE: NEGATIVE
NEUTROPHILS NFR BLD AUTO: 7.98 10*3/MM3 (ref 1.7–7)
NEUTROPHILS NFR BLD AUTO: 81.2 % (ref 42.7–76)
NRBC BLD AUTO-RTO: 0 /100 WBC (ref 0–0.2)
PLATELET # BLD AUTO: 212 10*3/MM3 (ref 140–450)
PMV BLD AUTO: 9.8 FL (ref 6–12)
POTASSIUM SERPL-SCNC: 3.2 MMOL/L (ref 3.5–5.2)
PROT SERPL-MCNC: 7.2 G/DL (ref 6–8.5)
PROTHROMBIN TIME: 13.4 SECONDS (ref 12.2–14.5)
QT INTERVAL: 376 MS
QTC INTERVAL: 441 MS
RBC # BLD AUTO: 4.47 10*6/MM3 (ref 3.77–5.28)
SODIUM SERPL-SCNC: 138 MMOL/L (ref 136–145)
T VAGINALIS DNA VAG QL NAA+PROBE: NEGATIVE
TROPONIN T SERPL HS-MCNC: <6 NG/L
WBC NRBC COR # BLD AUTO: 9.83 10*3/MM3 (ref 3.4–10.8)
WHOLE BLOOD HOLD COAG: NORMAL
WHOLE BLOOD HOLD SPECIMEN: NORMAL

## 2023-12-19 PROCEDURE — 85610 PROTHROMBIN TIME: CPT | Performed by: EMERGENCY MEDICINE

## 2023-12-19 PROCEDURE — 80053 COMPREHEN METABOLIC PANEL: CPT | Performed by: EMERGENCY MEDICINE

## 2023-12-19 PROCEDURE — 84702 CHORIONIC GONADOTROPIN TEST: CPT | Performed by: EMERGENCY MEDICINE

## 2023-12-19 PROCEDURE — 99283 EMERGENCY DEPT VISIT LOW MDM: CPT

## 2023-12-19 PROCEDURE — 25810000003 SODIUM CHLORIDE 0.9 % SOLUTION: Performed by: EMERGENCY MEDICINE

## 2023-12-19 PROCEDURE — 84484 ASSAY OF TROPONIN QUANT: CPT | Performed by: EMERGENCY MEDICINE

## 2023-12-19 PROCEDURE — 36415 COLL VENOUS BLD VENIPUNCTURE: CPT

## 2023-12-19 PROCEDURE — 93005 ELECTROCARDIOGRAM TRACING: CPT | Performed by: EMERGENCY MEDICINE

## 2023-12-19 PROCEDURE — 85025 COMPLETE CBC W/AUTO DIFF WBC: CPT | Performed by: EMERGENCY MEDICINE

## 2023-12-19 RX ORDER — POTASSIUM CHLORIDE 1.5 G/1.58G
20 POWDER, FOR SOLUTION ORAL ONCE
Status: COMPLETED | OUTPATIENT
Start: 2023-12-19 | End: 2023-12-19

## 2023-12-19 RX ORDER — SODIUM CHLORIDE 0.9 % (FLUSH) 0.9 %
10 SYRINGE (ML) INJECTION AS NEEDED
Status: DISCONTINUED | OUTPATIENT
Start: 2023-12-19 | End: 2023-12-19 | Stop reason: HOSPADM

## 2023-12-19 RX ADMIN — SODIUM CHLORIDE 1000 ML: 9 INJECTION, SOLUTION INTRAVENOUS at 16:44

## 2023-12-19 RX ADMIN — POTASSIUM CHLORIDE 20 MEQ: 1.5 POWDER, FOR SOLUTION ORAL at 16:49

## 2023-12-19 NOTE — ED PROVIDER NOTES
Subjective   History of Present Illness  Patient is a pleasant 23-year-old female, G2, P1, no significant complications with initial pregnancy, presents to the emergency department today with syncopal episode and lightheadedness.  She states that she is currently approximately a 19-week gestation, followed by Dr. Corea, OB/GYN.  She was in her normal state of health and was at [facility].  She states that when she would stand up to walk around [facility] she would get lightheaded.  She actually had 1 syncopal episode where her mother lowered her to the ground and the patient then awoke immediately once once lying on the ground.  The patient then stood up and each time she would start walk around the store again she would again get lightheaded.  She had no other full syncopal episodes but again was lightheaded and near syncopal with standing and ambulation.  As this persisted once the patient arrived home she called EMS and was brought to the emergency department for evaluation.  She notes that with her previous pregnancies she had very similar spells where she would become lightheaded when she would stand up.  She never did have any complete loss of consciousness as she did today while at [facility].  Denies any abdominal or pelvic complaints.  Denies fever, chills, chest pain, shortness of breath, abdominal pain, nausea, vomiting, diarrhea.  Denies any vaginal or pelvic contractions pelvic bleeding.      Review of Systems   All other systems reviewed and are negative.      Past Medical History:   Diagnosis Date    Acute cystitis 12/09/2021    GBS on prenatal Urine culture.  Rx amoxicillin.   Went to urgent treatment center and treated with Macrobid and 10 on 12/6/2021.  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  U/s 22; right dysplastic kidney measures 8/x5x6 cm. LOLI 7.3  Repeat BPP weekly. Continue oral hydration.     GERD without esophagitis 2022    Rx Pepcid 20 mg po bid.        No Known Allergies    Past Surgical History:   Procedure Laterality Date    APPENDECTOMY       SECTION N/A 2022    Procedure:  SECTION PRIMARY BREECH;  Surgeon: Kurt Corea MD;  Location: [facility] LABOR DELIVERY;  Service: Obstetrics/Gynecology;  Laterality: N/A;    EAR TUBES         Family History   Problem Relation Age of Onset    Diabetes Maternal Aunt     Diabetes Maternal Grandmother     Hypertension Maternal Grandmother        Social History     Socioeconomic History    Marital status: Single   Tobacco Use    Smoking status: Never    Smokeless tobacco: Never   Vaping Use    Vaping Use: Never used   Substance and Sexual Activity    Alcohol use: Never    Drug use: Yes     Types: Marijuana     Comment: LAST USE     Sexual activity: Yes           Objective   Physical Exam  Vitals and nursing note reviewed.   Constitutional:       General: She is not in acute distress.     Appearance: Normal appearance.   HENT:      Head: Normocephalic and atraumatic.   Eyes:      Conjunctiva/sclera: Conjunctivae normal.      Pupils: Pupils are equal, round, and reactive to light.   Neck:      Thyroid: No thyromegaly.   Cardiovascular:      Rate and Rhythm: Normal rate and regular rhythm.      Heart sounds: Normal heart sounds. No murmur heard.     No friction rub. No gallop.   Pulmonary:      Effort: Pulmonary effort is normal. No respiratory distress.      Breath sounds: Normal breath sounds.   Abdominal:      General: Bowel sounds are normal.      Palpations: Abdomen is soft.      Tenderness: There is no abdominal tenderness.   Musculoskeletal:         General: Normal range of motion.      Cervical back: Normal range of motion and  neck supple.   Lymphadenopathy:      Cervical: No cervical adenopathy.   Skin:     General: Skin is warm and dry.      Capillary Refill: Capillary refill takes less than 2 seconds.   Neurological:      General: No focal deficit present.      Mental Status: She is alert and oriented to person, place, and time.   Psychiatric:         Mood and Affect: Mood normal.         Behavior: Behavior normal.         Thought Content: Thought content normal.         Procedures           ED Course  ED Course as of 12/23/23 0544   Tue Dec 19, 2023   1737 Bedside ultrasound performed.  Fetal heart rate 148 bpm. [CP]      ED Course User Index  [CP] Keron Patel DO         Latest Reference Range & Units 12/19/23 13:51   HS Troponin T <14 ng/L <6   Sodium 136 - 145 mmol/L 138   Potassium 3.5 - 5.2 mmol/L 3.2 (L)   Chloride 98 - 107 mmol/L 103   CO2 22.0 - 29.0 mmol/L 20.0 (L)   Anion Gap 5.0 - 15.0 mmol/L 15.0   BUN 6 - 20 mg/dL 7   Creatinine 0.57 - 1.00 mg/dL 0.53 (L)   BUN/Creatinine Ratio 7.0 - 25.0  13.2   eGFR >60.0 mL/min/1.73 135.1   Glucose 65 - 99 mg/dL 72   Calcium 8.6 - 10.5 mg/dL 8.9   Alkaline Phosphatase 39 - 117 U/L 74   Total Protein 6.0 - 8.5 g/dL 7.2   Albumin 3.5 - 5.2 g/dL 4.1   Globulin gm/dL 3.1   A/G Ratio g/dL 1.3   AST (SGOT) 1 - 32 U/L 15   ALT (SGPT) 1 - 33 U/L 7   Total Bilirubin 0.0 - 1.2 mg/dL 0.5   HCG Quantitative mIU/mL 15,695.00   Protime 12.2 - 14.5 Seconds 13.4   INR 0.89 - 1.12  1.01   WBC 3.40 - 10.80 10*3/mm3 9.83   RBC 3.77 - 5.28 10*6/mm3 4.47   Hemoglobin 12.0 - 15.9 g/dL 13.5   Hematocrit 34.0 - 46.6 % 38.6   Platelets 140 - 450 10*3/mm3 212   RDW 12.3 - 15.4 % 13.0   MCV 79.0 - 97.0 fL 86.4   MCH 26.6 - 33.0 pg 30.2   MCHC 31.5 - 35.7 g/dL 35.0   MPV 6.0 - 12.0 fL 9.8   RDW-SD 37.0 - 54.0 fl 40.2   Neutrophil Rel % 42.7 - 76.0 % 81.2 (H)   Lymphocyte Rel % 19.6 - 45.3 % 13.4 (L)   Monocyte Rel % 5.0 - 12.0 % 4.1 (L)   Eosinophil Rel % 0.3 - 6.2 % 0.4   Basophil Rel % 0.0 - 1.5 % 0.2  "  Immature Granulocyte Rel % 0.0 - 0.5 % 0.7 (H)   Neutrophils Absolute 1.70 - 7.00 10*3/mm3 7.98 (H)   Lymphocytes Absolute 0.70 - 3.10 10*3/mm3 1.32   Monocytes Absolute 0.10 - 0.90 10*3/mm3 0.40   Eosinophils Absolute 0.00 - 0.40 10*3/mm3 0.04   Basophils Absolute 0.00 - 0.20 10*3/mm3 0.02   Immature Grans, Absolute 0.00 - 0.05 10*3/mm3 0.07 (H)   nRBC 0.0 - 0.2 /100 WBC 0.0   (L): Data is abnormally low  (H): Data is abnormally high        No orders to display     Vitals:    12/19/23 1257 12/19/23 1641   BP: 121/68 118/71   BP Location: Right arm Right arm   Patient Position: Sitting Lying   Pulse: 70 74   Resp: 18 18   Temp: 97.4 °F (36.3 °C) 97.7 °F (36.5 °C)   TempSrc: Oral Oral   SpO2: 100% 100%   Weight: 55.8 kg (123 lb) 55.8 kg (123 lb)   Height: 152.4 cm (60\") 157.5 cm (62\")     Medications   sodium chloride 0.9 % bolus 1,000 mL (0 mL Intravenous Stopped 12/19/23 1730)   potassium chloride (KLOR-CON) packet 20 mEq (20 mEq Oral Given 12/19/23 1649)     ECG/EMG Results (last 24 hours)       ** No results found for the last 24 hours. **          ECG 12 Lead Altered Mental Status   Final Result   Test Reason : Altered Mental Status   Blood Pressure :   */*   mmHG   Vent. Rate :  83 BPM     Atrial Rate :  83 BPM      P-R Int : 134 ms          QRS Dur :  80 ms       QT Int : 376 ms       P-R-T Axes :  66  71  36 degrees      QTc Int : 441 ms      Normal sinus rhythm   Normal ECG   When compared with ECG of 24-JUN-2022 09:54,   QT has lengthened   Confirmed by MD PANCHAL CORY ([id number]) on 12/19/2023 11:24:37 PM      Referred By: EDMD           Confirmed By: AISHA PACNHAL MD                                                 Medical Decision Making  Problems Addressed:  Lightheadedness: complicated acute illness or injury  Orthostatic dizziness: complicated acute illness or injury  Second trimester pregnancy: complicated acute illness or injury  Syncope and collapse: complicated acute illness or injury    Amount and/or " Complexity of Data Reviewed  Labs: ordered.  Radiology: ordered.  ECG/medicine tests: ordered.    Risk  Prescription drug management.        Final diagnoses:   Second trimester pregnancy   Lightheadedness   Syncope and collapse   Orthostatic dizziness       ED Disposition  ED Disposition       ED Disposition   Discharge    Condition   Stable    Comment   --             DISCHARGE    Patient discharged in stable condition.    Reviewed implications of results, diagnosis, meds, responsibility to follow up, warning signs and symptoms of possible worsening, potential complications and reasons to return to ER.    Patient/Family voiced understanding of above instructions.    Discussed plan for discharge, as there is no emergent indication for admission.  Pt/family is agreeable and understands need for follow up and possible repeat testing.  Pt/family is aware that discharge does not mean that nothing is wrong but that it indicates no emergency is currently present that requires admission and they must continue care with follow-up as given below or with a physician of their choice.     FOLLOW-UP  [facility]  [address]  [address]  [facility] [id number] [phone]  Schedule an appointment as soon as possible for a visit       [facility] EMERGENCY DEPARTMENT  [address] [id number] [phone]    If symptoms worsen    Your OBGYN    Schedule an appointment as soon as possible for a visit            Medication List      No changes were made to your prescriptions during this visit.                Keron Patel,   12/23/23 0549

## 2023-12-20 ENCOUNTER — TELEPHONE (OUTPATIENT)
Dept: OBSTETRICS AND GYNECOLOGY | Facility: CLINIC | Age: 23
End: 2023-12-20
Payer: COMMERCIAL

## 2023-12-20 NOTE — TELEPHONE ENCOUNTER
12/13/2023 vaginal swab was positive for Candida albicans.  Negative for BV.  Negative for trichomoniasis.  GC and Chlamydia negative.    Rx for Terazol 7 sent to pharmacy for vaginal yeast.

## 2023-12-26 ENCOUNTER — TELEPHONE (OUTPATIENT)
Dept: CARDIOLOGY | Facility: HOSPITAL | Age: 23
End: 2023-12-26
Payer: COMMERCIAL

## 2023-12-27 NOTE — TELEPHONE ENCOUNTER
Patient was referred to [facility] by the [facility]. Several attempts have been made to contact the patient with no success. Letter was mailed to patient to contact the office to schedule.

## 2024-01-10 ENCOUNTER — ROUTINE PRENATAL (OUTPATIENT)
Dept: OBSTETRICS AND GYNECOLOGY | Facility: CLINIC | Age: 24
End: 2024-01-10
Payer: COMMERCIAL

## 2024-01-10 VITALS — DIASTOLIC BLOOD PRESSURE: 70 MMHG | WEIGHT: 129.8 LBS | SYSTOLIC BLOOD PRESSURE: 120 MMHG | BODY MASS INDEX: 23.74 KG/M2

## 2024-01-10 DIAGNOSIS — N89.8 VAGINAL DISCHARGE: ICD-10-CM

## 2024-01-10 DIAGNOSIS — O34.219 PREVIOUS CESAREAN SECTION COMPLICATING PREGNANCY: ICD-10-CM

## 2024-01-10 DIAGNOSIS — F41.1 GENERALIZED ANXIETY DISORDER: ICD-10-CM

## 2024-01-10 DIAGNOSIS — Z12.4 SCREENING FOR CERVICAL CANCER: ICD-10-CM

## 2024-01-10 DIAGNOSIS — Z34.90 PREGNANCY, UNSPECIFIED GESTATIONAL AGE: Primary | ICD-10-CM

## 2024-01-10 DIAGNOSIS — A59.01 TRICHOMONAL VAGINITIS: ICD-10-CM

## 2024-01-10 DIAGNOSIS — R87.612 LGSIL ON PAP SMEAR OF CERVIX: ICD-10-CM

## 2024-01-10 DIAGNOSIS — Z87.42 HISTORY OF RECURRENT VAGINAL DISCHARGE: ICD-10-CM

## 2024-01-10 DIAGNOSIS — O09.299 HISTORY OF FETAL ANOMALY IN PRIOR PREGNANCY, CURRENTLY PREGNANT: ICD-10-CM

## 2024-01-10 DIAGNOSIS — Z34.82 PRENATAL CARE, SUBSEQUENT PREGNANCY, SECOND TRIMESTER: ICD-10-CM

## 2024-01-10 LAB
EXPIRATION DATE: 0
GLUCOSE UR STRIP-MCNC: NEGATIVE MG/DL
Lab: 0
PROT UR STRIP-MCNC: NEGATIVE MG/DL

## 2024-01-10 NOTE — PROGRESS NOTES
Colposcopy Procedure Note        Colposcopy    Date/Time: 1/10/2024 4:35 PM    Performed by: Kurt Corea MD  Authorized by: Kurt Corea MD  Preparation: Patient was prepped and draped in the usual sterile fashion.  Local anesthesia used: no    Anesthesia:  Local anesthesia used: no    Sedation:  Patient sedated: no    Patient tolerance: patient tolerated the procedure well with no immediate complications          Indications: Kristel Fernandez is a 23 y.o. female, , whose Patient's last menstrual period was 2023..  She presents for follow up for evaluation of an abnormal PAP smear that showed low-grade squamous intraepithelial neoplasia (LGSIL - encompassing HPV,mild dysplasia,VIKTOR I). Prior cervical treatment includes:  No prior Pap smears . She understands the need for the procedure and is aware of the complications, including post-colposcopic vaginal bleeding, vaginal leukorrhea or cervicitis.  She is aware she may experience discomfort.  After being presented with the risk, benefits, and alternatives the patient wished to proceed.      Urine pregnancy test not done patient is 20 weeks pregnant.        Procedure Details   The risks and benefits of the procedure and Verbal informed consent obtained. Time out: immediate members of the procedure team and patient agree to the following: correct patient, correct site, correct procedure to be performed.   Kurt Corea MD      She was positioned in the dorsal lithotomy position and a speculum was inserted into the vagina and excellent visualization of cervix achieved, cervix swabbed x 3 with acetic acid solution. The transformation zone was completely visualized.  A cervical biopsy was obtained at 12;00 o'clock.  An endocervical curettage was not performed.  This colposcopy was satisfactory.     Findings: White epithelium with punctation and mosaicism at 12:00.  The procedure was notable for: Brisk bleeding after small  biopsy.  Physical Exam  Genitourinary:     General: Normal vulva.             /70   Wt 58.9 kg (129 lb 12.8 oz)   LMP 2023   BMI 23.74 kg/m²       Specimens: Cervical biopsy at 12.  Specimens labelled and sent to Pathology.    Complications:  Brisk bleeding due to pregnancy. .    The patient tolerated the procedure well and with moderate discomfort and bleeding.    Assessment and Plan    Problem List Items Addressed This Visit              Generalized anxiety disorder    Overview     2023.  Currently stable off medication.  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  Discharge noted at colposcopy; NuSwab sent 1/10/2024.         LGSIL on Pap smear of cervix    Overview     Pap smear 2023 with LGSIL.  Positive for trichomonas.and BV  Pregnant.  Colpo 1/10/2024 with biopsy at 12.  No ECC.         Pregnancy - Primary    Overview     Dates consistent with -week u/s  Prior  for breech.  History of fetal renal multicystic dysplasia right kidney.  Girl.  7 pounds 4 ounce.   ; Cell free DNA screen low risk; c/w male.  aFP was negative.         Previous  section complicating pregnancy    Overview     2022; primary  for breech presentation.  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  7 pounds 4 ounce.         Screening for cervical cancer    Overview     2023 Pap with HPV done.  Pap smear 2023 with LGSIL.  Positive for trichomonas.and BV    Colpo 1/10/2024 with biopsy at 12.  No ECC.          Trichomonal vaginitis    Overview     2023; on Pap smear at prenatal visit.   Rx Flagy; treat partner.    Needs PRIYANKA in 4 wk. done 12/13/2023 12/13/2023 test of cure was negative            Other    Vaginal discharge    Overview     4/14/22; Wet mount positive for BV; Many WBCs  New swab sent.           Other Visit Diagnoses       Prenatal care, subsequent pregnancy, second trimester        Relevant Orders    POC Protein, Urine, Qualitative, Dipstick (Completed)    POC Glucose, Urine, Qualitative, Dipstick (Completed)            Will base further treatment on Pathology findings.  Treatment options discussed with patient.  Post biopsy instructions given to patient.  Repeat Pap smear 6 weeks postpartum; will also need 6-month follow-up Pap smear.    Kurt Corea MD  01/10/2024

## 2024-01-10 NOTE — PROGRESS NOTES
OB FOLLOW UP  CC- Here for care of pregnancy        Kristel Fernandez is a 23 y.o.  20w4d patient being seen today for her obstetrical follow up visit. Patient reports trouble taking Zoloft on a daily basis, has gone as much as 8 weeks without medication because she starts feeling better, and start back up taking medication due to feeling sad and not wanting to be around others, and hardly gets out the bed.  Patient needs Colpo with recent history of LSIL 23 on pap.       Her prenatal care is complicated by (and status) :   Patient Active Problem List   Diagnosis    Trichomonal vaginitis    Vaginal discharge    Previous  section complicating pregnancy    BV (bacterial vaginosis)    Screening for cervical cancer    Pregnancy    Generalized anxiety disorder    History of fetal anomaly in prior pregnancy, currently pregnant    LGSIL on Pap smear of cervix    History of recurrent vaginal discharge       Flu Status: Already given in current flu season  Ultrasound Today: Yes    ROS -   Patient Reports :  see above  Patient Denies: Loss of Fluid, Vaginal Spotting, Vision Changes, Headaches, Nausea , Vomiting , Contractions, and Epigastric pain  Fetal Movement : normal  All other systems reviewed and are negative.       The additional following portions of the patient's history were reviewed and updated as appropriate: allergies, current medications, past family history, past medical history, past social history, past surgical history, and problem list.      I have reviewed and agree with the HPI, ROS, and historical information as entered above.   Kurt Corea MD      /70   Wt 58.9 kg (129 lb 12.8 oz)   LMP 2023   BMI 23.74 kg/m²       EXAM:     Prenatal Vitals  BP: 120/70  Weight: 58.9 kg (129 lb 12.8 oz)   Fetal Heart Rate: 155 US          Urine Glucose Read-only: Negative  Urine Protein Read-only: Negative       Assessment and Plan    Problem List Items Addressed This Visit               Generalized anxiety disorder    Overview     2023.  Currently stable off medication.  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  Discharge noted at colposcopy; NuSwab sent 1/10/2024.         LGSIL on Pap smear of cervix    Overview     Pap smear 2023 with LGSIL.  Positive for trichomonas.and BV  Pregnant.  Colpo 1/10/2024 with biopsy at 12.  No ECC.         Pregnancy - Primary    Overview     Dates consistent with -week u/s  Prior  for breech.  History of fetal renal multicystic dysplasia right kidney.  Girl.  7 pounds 4 ounce.   ; Cell free DNA screen low risk; c/w male.  aFP was negative.         Previous  section complicating pregnancy    Overview     2022; primary  for breech presentation.  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  7 pounds 4 ounce.         Screening for cervical cancer    Overview     2023 Pap with HPV done.  Pap smear 2023 with LGSIL.  Positive for trichomonas.and BV    Colpo 1/10/2024 with biopsy at 12.  No ECC.          Trichomonal vaginitis    Overview     2023; on Pap smear at prenatal visit.   Rx Flagy; treat partner.   Needs PRIYANKA in 4 wk. done 2023 test of cure was negative            Other    Vaginal discharge    Overview     22; Wet mount positive for BV; Many WBCs  New swab sent.           Other Visit Diagnoses       Prenatal care, subsequent pregnancy, second trimester        Relevant Orders    POC Protein, Urine, Qualitative, Dipstick (Completed)    POC Glucose, Urine, Qualitative, Dipstick (Completed)            Pregnancy at  20w4d  Anatomy scan today is complete and appear within normal limits.  Fetal status reassuring.   Pap smear with LGSIL.  Needs colposcopy today.  Vaginal discharge noted at colposcopy.  NuSwab sent.  Activity and Exercise discussed.  Patient is on Prenatal vitamins  Return in about 4 weeks (around 2/7/2024).      Kurt Corea MD  01/10/2024

## 2024-01-11 LAB — REF LAB TEST METHOD: NORMAL

## 2024-01-13 LAB
A VAGINAE DNA VAG QL NAA+PROBE: ABNORMAL SCORE
BVAB2 DNA VAG QL NAA+PROBE: ABNORMAL SCORE
MEGA1 DNA VAG QL NAA+PROBE: ABNORMAL SCORE

## 2024-01-16 ENCOUNTER — TELEPHONE (OUTPATIENT)
Dept: OBSTETRICS AND GYNECOLOGY | Facility: CLINIC | Age: 24
End: 2024-01-16
Payer: COMMERCIAL

## 2024-01-16 PROBLEM — N76.0 BACTERIAL VAGINITIS: Status: ACTIVE | Noted: 2024-01-16

## 2024-01-16 PROBLEM — B96.89 BACTERIAL VAGINITIS: Status: ACTIVE | Noted: 2024-01-16

## 2024-01-16 NOTE — TELEPHONE ENCOUNTER
Vaginal discharge was consistent with bacterial vaginosis.  May be typical BV and resistant to Flagyl.  Rx clindamycin vaginal cream 1 applicator PV nightly for 7 nights.    GC, chlamydia, and trichomonas were negative.

## 2024-01-16 NOTE — TELEPHONE ENCOUNTER
Approved today  The request has been approved. The authorization is effective for a maximum of 1 fills from 01/16/2024 to 02/15/2024, as long as the member is enrolled in their current health plan. The request was approved for generic equivalent only. A written notification letter will follow with additional details. [facility] notified . Zero dollar copay.

## 2024-01-22 ENCOUNTER — TELEPHONE (OUTPATIENT)
Dept: OBSTETRICS AND GYNECOLOGY | Facility: CLINIC | Age: 24
End: 2024-01-22

## 2024-02-19 ENCOUNTER — ROUTINE PRENATAL (OUTPATIENT)
Dept: OBSTETRICS AND GYNECOLOGY | Facility: CLINIC | Age: 24
End: 2024-02-19
Payer: COMMERCIAL

## 2024-02-19 VITALS — WEIGHT: 136 LBS | BODY MASS INDEX: 24.87 KG/M2 | SYSTOLIC BLOOD PRESSURE: 120 MMHG | DIASTOLIC BLOOD PRESSURE: 60 MMHG

## 2024-02-19 DIAGNOSIS — A59.01 TRICHOMONAL VAGINITIS: Primary | ICD-10-CM

## 2024-02-19 DIAGNOSIS — O34.219 PREVIOUS CESAREAN SECTION COMPLICATING PREGNANCY: ICD-10-CM

## 2024-02-19 DIAGNOSIS — F41.1 GENERALIZED ANXIETY DISORDER: ICD-10-CM

## 2024-02-19 DIAGNOSIS — R87.612 LGSIL ON PAP SMEAR OF CERVIX: ICD-10-CM

## 2024-02-19 DIAGNOSIS — Z3A.26 26 WEEKS GESTATION OF PREGNANCY: ICD-10-CM

## 2024-02-19 DIAGNOSIS — O09.299 HISTORY OF FETAL ANOMALY IN PRIOR PREGNANCY, CURRENTLY PREGNANT: ICD-10-CM

## 2024-02-19 DIAGNOSIS — Z87.42 HISTORY OF RECURRENT VAGINAL DISCHARGE: ICD-10-CM

## 2024-02-19 PROCEDURE — 99213 OFFICE O/P EST LOW 20 MIN: CPT | Performed by: NURSE PRACTITIONER

## 2024-02-19 NOTE — PROGRESS NOTES
OB FOLLOW UP  CC- Here for care of pregnancy        Kristel Fernandez is a 23 y.o.  26w2d patient being seen today for her obstetrical follow up visit. Patient reports no complaints. 28 wk instr given/O+. Pt feels well, good fm.    Her prenatal care is complicated by (and status) :   Patient Active Problem List   Diagnosis    Trichomonal vaginitis    Vaginal discharge    Previous  section complicating pregnancy    BV (bacterial vaginosis)    Screening for cervical cancer    Pregnancy    Generalized anxiety disorder    History of fetal anomaly in prior pregnancy, currently pregnant    LGSIL on Pap smear of cervix    History of recurrent vaginal discharge    Bacterial vaginitis       Flu Status: Already given in current flu season  Ultrasound Today: No  Reviewed 1 hr glucose testing, TDAP, and Rhogam next visit.    ROS -   Patient Denies: leaking of fluid, vaginal bleeding, dysuria, excessive vomiting, and more than 6 contractions per hour  Fetal Movement : normal  All other systems reviewed and are negative.       The additional following portions of the patient's history were reviewed and updated as appropriate: allergies, current medications, past family history, past medical history, past social history, past surgical history, and problem list.      I have reviewed and agree with the HPI, ROS, and historical information as entered above. Chelsey Beltran, APRN      /60   Wt 61.7 kg (136 lb)   LMP 2023   BMI 24.87 kg/m²       EXAM:     Prenatal Vitals  BP: 120/60  Weight: 61.7 kg (136 lb)   Fetal Heart Rate: 150      PT unable to leave urine sample today          Assessment and Plan    Problem List Items Addressed This Visit       Trichomonal vaginitis - Primary    Overview     2023; on Pap smear at prenatal visit.   Rx Flagy; treat partner.   Needs PRIYANKA in 4 wk. done 2023 test of cure was negative         Previous  section complicating pregnancy     Overview     2022; primary  for breech presentation.  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  7 pounds 4 ounce.         Pregnancy    Overview     Dates consistent with -week u/s  Prior  for breech.  History of fetal renal multicystic dysplasia right kidney.  Girl.  7 pounds 4 ounce.   ; Cell free DNA screen low risk; c/w male.  aFP was negative.         Generalized anxiety disorder    Overview     2023.  Currently stable off medication.  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  Positive for trichomonas.and BV  Pregnant.  Colpo 1/10/2024 with biopsy at 12.  No ECC.  Biopsy with LGSIL.  Repeat Pap smear 6 weeks postpartum.         History of recurrent vaginal discharge    Overview     History of treatment of BV and trichomonas during pregnancy.  Discharge noted at colposcopy; NuSwab sent 1/10/2024.            Pregnancy at 26w2d  Fetal status reassuring.  No US indicated today.  1 hour gtt, CBC, Antibody screen, TDAP, and RPR next visit. Instructions given  Fetal movement/PTL or Labor precautions  Reviewed routine prenatal care with the office and educational materials given  Discussed/encouraged TDAP vaccination after 28 weeks  Activity and Exercise discussed.  Return in about 2 weeks (around 3/4/2024) for Annual physical.      Chelsey Beltran, SANGEETHA  2024

## 2024-02-26 NOTE — TELEPHONE ENCOUNTER
"Late entry, carly last note stating \"had new OB visit\"-error.  KJB  See patient  note 1/10/24.  Pt picked up medication for treatment of BV.    "

## 2024-03-04 ENCOUNTER — TELEPHONE (OUTPATIENT)
Dept: OBSTETRICS AND GYNECOLOGY | Facility: CLINIC | Age: 24
End: 2024-03-04

## 2024-03-04 NOTE — TELEPHONE ENCOUNTER
Provider: DR. FERNANDEZ    Caller: REJI PEREZ    Relationship to Patient: SELF    Phone Number: [phone]    Reason for Call: PATIENT CANCELLED TODAY'S APPT AND WOULD LIKE TO RESCHEDULE.     When was the patient last seen: 2/19/24    PATIENT REQUESTING A CALL BACK TO RESCHEDULE.

## 2024-03-06 ENCOUNTER — ROUTINE PRENATAL (OUTPATIENT)
Dept: OBSTETRICS AND GYNECOLOGY | Facility: CLINIC | Age: 24
End: 2024-03-06
Payer: COMMERCIAL

## 2024-03-06 VITALS — DIASTOLIC BLOOD PRESSURE: 70 MMHG | SYSTOLIC BLOOD PRESSURE: 118 MMHG | WEIGHT: 138.8 LBS | BODY MASS INDEX: 25.39 KG/M2

## 2024-03-06 DIAGNOSIS — Z12.4 SCREENING FOR CERVICAL CANCER: ICD-10-CM

## 2024-03-06 DIAGNOSIS — O34.219 PREVIOUS CESAREAN SECTION COMPLICATING PREGNANCY: ICD-10-CM

## 2024-03-06 DIAGNOSIS — F41.1 GENERALIZED ANXIETY DISORDER: ICD-10-CM

## 2024-03-06 DIAGNOSIS — B96.89 BACTERIAL VAGINITIS: ICD-10-CM

## 2024-03-06 DIAGNOSIS — R87.612 LGSIL ON PAP SMEAR OF CERVIX: ICD-10-CM

## 2024-03-06 DIAGNOSIS — O09.299 HISTORY OF FETAL ANOMALY IN PRIOR PREGNANCY, CURRENTLY PREGNANT: ICD-10-CM

## 2024-03-06 DIAGNOSIS — N76.0 BACTERIAL VAGINITIS: ICD-10-CM

## 2024-03-06 DIAGNOSIS — Z34.90 PRENATAL CARE, ANTEPARTUM: Primary | ICD-10-CM

## 2024-03-06 DIAGNOSIS — Z34.90 PREGNANCY, UNSPECIFIED GESTATIONAL AGE: ICD-10-CM

## 2024-03-06 LAB
GLUCOSE UR STRIP-MCNC: NEGATIVE MG/DL
PROT UR STRIP-MCNC: NEGATIVE MG/DL

## 2024-03-06 NOTE — PROGRESS NOTES
OB FOLLOW UP  CC- Here for care of pregnancy        Kristel Fernandez is a 23 y.o.  28w4d patient being seen today for her obstetrical follow up. Patient reports that her feet stay swollen.     Patient undergoing Glucola testing today. She is due for her testing at 1230.       MBT: O+  Rhogam: is not indicated.  28 week packet: reviewed with patient , counseled on fetal movement , breast pump discussed, and childbirth classes reviewed  TDAP: will receive next visit  Flu Status: Already given in current flu season  Ultrasound Today: No    Her prenatal care is complicated by (and status) :   Patient Active Problem List   Diagnosis    Trichomonal vaginitis    Vaginal discharge    Previous  section complicating pregnancy    BV (bacterial vaginosis)    Screening for cervical cancer    Pregnancy    Generalized anxiety disorder    History of fetal anomaly in prior pregnancy, currently pregnant    LGSIL on Pap smear of cervix    History of recurrent vaginal discharge    Bacterial vaginitis         ROS -   Patient Denies: Loss of Fluid, Vaginal Spotting, Vision Changes, Headaches, Nausea , Vomiting , Contractions, Epigastric pain, and skin itching  Fetal Movement : normal    The additional following portions of the patient's history were reviewed and updated as appropriate: allergies, current medications, past family history, past medical history, past social history, past surgical history, and problem list.    I have reviewed and agree with the HPI, ROS, and historical information as entered above.   Kurt Corea MD      /70   Wt 63 kg (138 lb 12.8 oz)   LMP 2023   BMI 25.39 kg/m²         EXAM:     Prenatal Vitals  BP: 118/70  Weight: 63 kg (138 lb 12.8 oz)   Fetal Heart Rate: 130      Fundal Height (cm): 28 cm        Urine Glucose Read-only: Negative  Urine Protein Read-only: Negative         Assessment and Plan    Problem List Items Addressed This Visit          Gynecologic and  Obstetric Problems    Bacterial vaginitis    Overview     1/10/2024 vaginal discharge was consistent with bacterial vaginosis.  May be typical BV and resistant to Flagyl.  Rx clindamycin vaginal cream 1 applicator PV nightly for 7 nights.    GC, chlamydia, and trichomonas were negative.            Other    Generalized anxiety disorder    Overview     2023.  Currently stable off medication.  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  Positive for trichomonas.and BV  Pregnant.  Colpo 1/10/2024 with biopsy at 12.  No ECC.  Biopsy with LGSIL.  Repeat Pap smear 6 weeks postpartum.         Pregnancy    Overview     Dates consistent with -week u/s  Prior  for breech.  History of fetal renal multicystic dysplasia right kidney.  Girl.  7 pounds 4 ounce.   ; Cell free DNA screen low risk; c/w male.  aFP was negative.         Previous  section complicating pregnancy    Overview     2022; primary  for breech presentation.  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  7 pounds 4 ounce.    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          Screening for cervical cancer    Overview     2023 Pap with HPV done.  Pap smear 2023 with LGSIL.  Positive for trichomonas.and BV     Colpo 1/10/2024 with biopsy at 12.  No ECC.  Biopsy with LGSIL.  Repeat Pap smear 6 weeks postpartum.          Other Visit Diagnoses       Prenatal care, antepartum    -  Primary    Relevant Orders    POC Urinalysis Dipstick (Completed)    CBC (No Diff)    Gestational Screen 1 Hr ([facility])    Antibody Screen    RPR            Pregnancy at 28w4d  1 hr  Glucola, CBC, RPR. Antibody screen and TDAP today  Fetal movement/PTL or Labor precautions  Activity and Exercise discussed.  History of fetal anomaly with prior pregnancy.    Follow-up ultrasound at [facility].   History of prior  section.  Interested in repeat .  Return in about 4 weeks (around 4/3/2024) for [facility] u/s 4 wk, Next scheduled follow up.        Kurt Corea MD  2024

## 2024-03-07 ENCOUNTER — TELEPHONE (OUTPATIENT)
Dept: OBSTETRICS AND GYNECOLOGY | Facility: CLINIC | Age: 24
End: 2024-03-07
Payer: COMMERCIAL

## 2024-03-07 PROBLEM — O99.019 IRON DEFICIENCY ANEMIA DURING PREGNANCY: Status: ACTIVE | Noted: 2024-03-07

## 2024-03-07 PROBLEM — D50.9 IRON DEFICIENCY ANEMIA DURING PREGNANCY: Status: ACTIVE | Noted: 2024-03-07

## 2024-03-07 LAB
BLD GP AB SCN SERPL QL: NEGATIVE
ERYTHROCYTE [DISTWIDTH] IN BLOOD BY AUTOMATED COUNT: 12.7 % (ref 12.3–15.4)
GLUCOSE 1H P 50 G GLC PO SERPL-MCNC: 118 MG/DL (ref 65–139)
HCT VFR BLD AUTO: 33.9 % (ref 34–46.6)
HGB BLD-MCNC: 11.6 G/DL (ref 12–15.9)
MCH RBC QN AUTO: 29.7 PG (ref 26.6–33)
MCHC RBC AUTO-ENTMCNC: 34.2 G/DL (ref 31.5–35.7)
MCV RBC AUTO: 86.7 FL (ref 79–97)
PLATELET # BLD AUTO: 183 10*3/MM3 (ref 140–450)
RBC # BLD AUTO: 3.91 10*6/MM3 (ref 3.77–5.28)
RPR SER QL: NON REACTIVE
WBC # BLD AUTO: 7.83 10*3/MM3 (ref 3.4–10.8)

## 2024-03-07 RX ORDER — FERROUS SULFATE 325(65) MG
325 TABLET ORAL
Qty: 30 TABLET | Refills: 9 | Status: SHIPPED | OUTPATIENT
Start: 2024-03-07

## 2024-04-23 ENCOUNTER — HOSPITAL ENCOUNTER (OUTPATIENT)
Dept: WOMENS IMAGING | Facility: HOSPITAL | Age: 24
Discharge: HOME | End: 2024-04-23
Admitting: OBSTETRICS & GYNECOLOGY
Payer: COMMERCIAL

## 2024-04-23 ENCOUNTER — OFFICE VISIT (OUTPATIENT)
Dept: OBSTETRICS AND GYNECOLOGY | Facility: HOSPITAL | Age: 24
End: 2024-04-23
Payer: COMMERCIAL

## 2024-04-23 VITALS
HEIGHT: 59 IN | BODY MASS INDEX: 29.64 KG/M2 | SYSTOLIC BLOOD PRESSURE: 114 MMHG | WEIGHT: 147 LBS | DIASTOLIC BLOOD PRESSURE: 73 MMHG

## 2024-04-23 DIAGNOSIS — O34.219 PREVIOUS CESAREAN SECTION COMPLICATING PREGNANCY: ICD-10-CM

## 2024-04-23 DIAGNOSIS — O09.299 HISTORY OF FETAL ANOMALY IN PRIOR PREGNANCY, CURRENTLY PREGNANT: Primary | ICD-10-CM

## 2024-04-23 DIAGNOSIS — Z34.90 PREGNANCY, UNSPECIFIED GESTATIONAL AGE: ICD-10-CM

## 2024-04-23 DIAGNOSIS — O09.299 HISTORY OF FETAL ANOMALY IN PRIOR PREGNANCY, CURRENTLY PREGNANT: ICD-10-CM

## 2024-04-23 PROCEDURE — 76811 OB US DETAILED SNGL FETUS: CPT | Performed by: OBSTETRICS & GYNECOLOGY

## 2024-04-23 PROCEDURE — 76811 OB US DETAILED SNGL FETUS: CPT

## 2024-04-23 PROCEDURE — 76819 FETAL BIOPHYS PROFIL W/O NST: CPT | Performed by: OBSTETRICS & GYNECOLOGY

## 2024-04-23 PROCEDURE — 76819 FETAL BIOPHYS PROFIL W/O NST: CPT

## 2024-04-23 NOTE — PROGRESS NOTES
Patient has an appointment with Dr. Corea tomorrow. NIPT and AFP negative. Patient reports random contractions; denies vaginal bleeding and loss of fluid. Family history of carrier of sickle cell trait: patient's daughter.

## 2024-04-24 ENCOUNTER — LAB (OUTPATIENT)
Dept: LAB | Facility: HOSPITAL | Age: 24
End: 2024-04-24
Payer: COMMERCIAL

## 2024-04-24 ENCOUNTER — ROUTINE PRENATAL (OUTPATIENT)
Dept: OBSTETRICS AND GYNECOLOGY | Facility: CLINIC | Age: 24
End: 2024-04-24
Payer: COMMERCIAL

## 2024-04-24 VITALS — SYSTOLIC BLOOD PRESSURE: 108 MMHG | DIASTOLIC BLOOD PRESSURE: 70 MMHG | WEIGHT: 146.6 LBS | BODY MASS INDEX: 29.61 KG/M2

## 2024-04-24 DIAGNOSIS — F41.1 GENERALIZED ANXIETY DISORDER: ICD-10-CM

## 2024-04-24 DIAGNOSIS — O09.299 HISTORY OF FETAL ANOMALY IN PRIOR PREGNANCY, CURRENTLY PREGNANT: ICD-10-CM

## 2024-04-24 DIAGNOSIS — O36.60X0 EXCESSIVE FETAL GROWTH AFFECTING MANAGEMENT OF PREGNANCY, ANTEPARTUM, SINGLE OR UNSPECIFIED FETUS: ICD-10-CM

## 2024-04-24 DIAGNOSIS — O99.019 IRON DEFICIENCY ANEMIA DURING PREGNANCY: ICD-10-CM

## 2024-04-24 DIAGNOSIS — Z34.90 PREGNANCY, UNSPECIFIED GESTATIONAL AGE: Primary | ICD-10-CM

## 2024-04-24 DIAGNOSIS — D50.9 IRON DEFICIENCY ANEMIA DURING PREGNANCY: ICD-10-CM

## 2024-04-24 DIAGNOSIS — R87.612 LGSIL ON PAP SMEAR OF CERVIX: ICD-10-CM

## 2024-04-24 DIAGNOSIS — Z12.4 SCREENING FOR CERVICAL CANCER: ICD-10-CM

## 2024-04-24 DIAGNOSIS — Z34.90 PREGNANCY, UNSPECIFIED GESTATIONAL AGE: ICD-10-CM

## 2024-04-24 DIAGNOSIS — O34.219 PREVIOUS CESAREAN SECTION COMPLICATING PREGNANCY: ICD-10-CM

## 2024-04-24 LAB
GLUCOSE UR STRIP-MCNC: NEGATIVE MG/DL
PROT UR STRIP-MCNC: NEGATIVE MG/DL

## 2024-04-24 PROCEDURE — 87081 CULTURE SCREEN ONLY: CPT

## 2024-04-24 NOTE — PROGRESS NOTES
OB FOLLOW UP  CC- Here for care of pregnancy        Kristel Fernandez is a 23 y.o.  35w4d patient being seen today for her obstetrical follow up visit. Patient reports severe edema. Her hands hurt secondary to edema.  Reports that she does have shady al contractions.     Her prenatal care is complicated by (and status) :  Patient Active Problem List   Diagnosis    Trichomonal vaginitis    Vaginal discharge    Previous  section complicating pregnancy    BV (bacterial vaginosis)    Screening for cervical cancer    Pregnancy    Generalized anxiety disorder    History of fetal anomaly in prior pregnancy, currently pregnant    LGSIL on Pap smear of cervix    History of recurrent vaginal discharge    Bacterial vaginitis    Iron deficiency anemia during pregnancy    Breech presentation    Excessive fetal growth affecting management of pregnancy, antepartum       GBS Status: Done Today. She is not allergic to PCN.    No Known Allergies       Flu Status: Already given in current flu season  Her Delivery Plan is: Undecided    US today: no  Non Stress Test: No.    ROS -   Patient Denies: Loss of Fluid, Vaginal Spotting, Vision Changes, Headaches, Nausea , Vomiting , Epigastric pain, and skin itching  Fetal Movement : normal  All other systems reviewed and are negative.       The additional following portions of the patient's history were reviewed and updated as appropriate: allergies, current medications, past family history, past medical history, past social history, past surgical history, and problem list.    I have reviewed and agree with the HPI, ROS, and historical information as entered above.   Kurt Corea MD        EXAM:     Prenatal Vitals  BP: 108/70  Weight: 66.5 kg (146 lb 9.6 oz)   Fetal Heart Rate: 144       Dilation/Effacement/Station  Dilation: Closed  Effacement (%): 40  Station: -5      Urine Glucose Read-only: Negative  Urine Protein Read-only: Negative           Assessment and  Plan    Problem List Items Addressed This Visit          Gynecologic and Obstetric Problems    Iron deficiency anemia during pregnancy    Overview     Iron deficiency anemia.  Hematocrit 33% at 28 weeks.  Rx iron sulfate 325 mg every other day.            Other    Breech presentation    Overview       2024; [facility] ultrasound normal with breech presentation.  EFW 72%.   AC 84%.         Excessive fetal growth affecting management of pregnancy, antepartum    Overview     2024; [facility] ultrasound normal with presentation.  EFW 72%.   AC 84%.         Generalized anxiety disorder    Overview     2023.  Currently stable off medication.  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  EFW 72%.   AC 84%.         LGSIL on Pap smear of cervix    Overview     Pap smear 2023 with LGSIL.  Positive for trichomonas.and BV  Pregnant.  Colpo 1/10/2024 with biopsy at 12.  No ECC.  Biopsy with LGSIL.  Repeat Pap smear 6 weeks postpartum.         Pregnancy - Primary    Overview     Dates consistent with -week u/s  Prior  for breech.  History of fetal renal multicystic dysplasia right kidney.  Girl.  7 pounds 4 ounce.   ; Cell free DNA screen low risk; c/w male.  aFP was negative.    2024; [facility] ultrasound normal; breech presentation.  EFW 72%.   AC 84%.         Relevant Orders    POC Urinalysis Dipstick (Completed)    Group B Streptococcus Culture - Swab, Vaginal/Rectum    Previous  section complicating pregnancy    Overview     2022; primary  for breech presentation.  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  7 pounds 4 ounce.    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          Screening for cervical cancer    Overview     2023 Pap with HPV done.  Pap smear  2023 with LGSIL.  Positive for trichomonas.and BV     Colpo 1/10/2024 with biopsy at 12.  No ECC.  Biopsy with LGSIL.  Repeat Pap smear 6 weeks postpartum.            Pregnancy at 35w4d; group B strep screen done today.  Breech presentation on [facility] ultrasound yesterday.  History of prior  section for breech.  Requests repeat .  [facility] ultrasound was normal; EFW 72% and AC 84%.  Fetal status reassuring.   Reviewed Pre-eclampsia signs/symptoms  Wishes to schedule repeat  at 39 weeks.  Delivery options reviewed with patient  Signs of labor reviewed  Kick counts reviewed  Activity and Exercise discussed.  Return in about 1 week (around 2024) for Next scheduled follow up.    Kurt Corea MD  2024

## 2024-04-27 LAB — BACTERIA SPEC AEROBE CULT: NORMAL

## 2024-05-01 ENCOUNTER — ROUTINE PRENATAL (OUTPATIENT)
Dept: OBSTETRICS AND GYNECOLOGY | Facility: CLINIC | Age: 24
End: 2024-05-01
Payer: COMMERCIAL

## 2024-05-01 VITALS — SYSTOLIC BLOOD PRESSURE: 110 MMHG | DIASTOLIC BLOOD PRESSURE: 70 MMHG | WEIGHT: 147.2 LBS | BODY MASS INDEX: 29.73 KG/M2

## 2024-05-01 DIAGNOSIS — O36.60X0 EXCESSIVE FETAL GROWTH AFFECTING MANAGEMENT OF PREGNANCY, ANTEPARTUM, SINGLE OR UNSPECIFIED FETUS: ICD-10-CM

## 2024-05-01 DIAGNOSIS — Z12.4 SCREENING FOR CERVICAL CANCER: ICD-10-CM

## 2024-05-01 DIAGNOSIS — D50.9 IRON DEFICIENCY ANEMIA DURING PREGNANCY: ICD-10-CM

## 2024-05-01 DIAGNOSIS — O09.299 HISTORY OF FETAL ANOMALY IN PRIOR PREGNANCY, CURRENTLY PREGNANT: ICD-10-CM

## 2024-05-01 DIAGNOSIS — Z34.03 ENCOUNTER FOR SUPERVISION OF NORMAL FIRST PREGNANCY IN THIRD TRIMESTER: Primary | ICD-10-CM

## 2024-05-01 DIAGNOSIS — F41.1 GENERALIZED ANXIETY DISORDER: ICD-10-CM

## 2024-05-01 DIAGNOSIS — R87.612 LGSIL ON PAP SMEAR OF CERVIX: ICD-10-CM

## 2024-05-01 DIAGNOSIS — Z34.90 PREGNANCY, UNSPECIFIED GESTATIONAL AGE: ICD-10-CM

## 2024-05-01 DIAGNOSIS — O99.019 IRON DEFICIENCY ANEMIA DURING PREGNANCY: ICD-10-CM

## 2024-05-01 DIAGNOSIS — O34.219 PREVIOUS CESAREAN SECTION COMPLICATING PREGNANCY: ICD-10-CM

## 2024-05-01 LAB
GLUCOSE UR STRIP-MCNC: NEGATIVE MG/DL
PROT UR STRIP-MCNC: NEGATIVE MG/DL

## 2024-05-01 NOTE — PROGRESS NOTES
OB FOLLOW UP  CC- Here for care of pregnancy        Kristel Fernandez is a 23 y.o.  36w4d patient being seen today for her obstetrical follow up visit. Patient reports shady al contractions and edema (primarily at night) in bilateral legs and hands.     Her prenatal care is complicated by (and status) :  Patient Active Problem List   Diagnosis    Trichomonal vaginitis    Vaginal discharge    Previous  section complicating pregnancy    BV (bacterial vaginosis)    Screening for cervical cancer    Pregnancy    Generalized anxiety disorder    History of fetal anomaly in prior pregnancy, currently pregnant    LGSIL on Pap smear of cervix    History of recurrent vaginal discharge    Bacterial vaginitis    Iron deficiency anemia during pregnancy    Breech presentation    Excessive fetal growth affecting management of pregnancy, antepartum       GBS Status: was already done and is negative.    No Known Allergies       Flu Status: Already given in current flu season  Her Delivery Plan is: Repeat . Scheduled  24   today: no  Non Stress Test: No.    ROS -   Patient Denies: Loss of Fluid, Vaginal Spotting, Vision Changes, Headaches, Nausea , Vomiting , Epigastric pain, and skin itching  Fetal Movement : normal  All other systems reviewed and are negative.       The additional following portions of the patient's history were reviewed and updated as appropriate: allergies, current medications, past family history, past medical history, past social history, past surgical history, and problem list.    I have reviewed and agree with the HPI, ROS, and historical information as entered above.   Kurt Corea MD        EXAM:     Prenatal Vitals  BP: 110/70  Weight: 66.8 kg (147 lb 3.2 oz)   Fetal Heart Rate: 142                          Assessment and Plan    Problem List Items Addressed This Visit          Gynecologic and Obstetric Problems    Iron deficiency anemia during pregnancy     Overview     Iron deficiency anemia.  Hematocrit 33% at 28 weeks.  Rx iron sulfate 325 mg every other day.            Other    Breech presentation    Overview       2024; PDC ultrasound normal with breech presentation.  EFW 72%.   AC 84%.         Relevant Orders    US Ob Follow Up Transabdominal Approach    US Fetal Biophysical Profile;Without Non-Stress Testing    Excessive fetal growth affecting management of pregnancy, antepartum    Overview     2024; PDC ultrasound normal with presentation.  EFW 72%.   AC 84%.         Relevant Orders    US Ob Follow Up Transabdominal Approach    US Fetal Biophysical Profile;Without Non-Stress Testing    Generalized anxiety disorder    Overview     2023.  Currently stable off medication.  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  EFW 72%.   AC 84%.         LGSIL on Pap smear of cervix    Overview     Pap smear 2023 with LGSIL.  Positive for trichomonas.and BV  Pregnant.  Colpo 1/10/2024 with biopsy at 12.  No ECC.  Biopsy with LGSIL.  Repeat Pap smear 6 weeks postpartum.         Pregnancy    Overview     Dates consistent with -week u/s  Prior  for breech.  History of fetal renal multicystic dysplasia right kidney.  Girl.  7 pounds 4 ounce.   ; Cell free DNA screen low risk; c/w male.  aFP was negative.    2024; PDC ultrasound normal; breech presentation.  EFW 72%.   AC 84%.         Previous  section complicating pregnancy    Overview     2022; primary  for breech presentation.  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  7 pounds 4 ounce.    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     Repeat   at 12 noon.             Screening for cervical cancer    Overview     2023 Pap with  HPV done.  Pap smear 2023 with LGSIL.  Positive for trichomonas.and BV     Colpo 1/10/2024 with biopsy at 12.  No ECC.  Biopsy with LGSIL.  Repeat Pap smear 6 weeks postpartum.          Other Visit Diagnoses       Encounter for supervision of normal first pregnancy in third trimester    -  Primary    Relevant Orders    POC Urinalysis Dipstick            Pregnancy at 36w4d; group B strep screen was negative.  Persistent breech presentation.  Large for gestational age on ultrasound at [facility] 2024.  Repeat with ultrasound biophysical profile next week.  History of prior  section.  Repeat  scheduled  at 12 noon.  Fetal status reassuring.   Reviewed Pre-eclampsia signs/symptoms  Reviewed upcoming c/s and PAT instructions with patient. Arrival time and NPO status reviewed.   Delivery options reviewed with patient  Signs of labor reviewed  Kick counts reviewed  Activity and Exercise discussed.  Return in about 1 week (around 2024) for Next scheduled follow up, Follow-up ultrasound.    Kurt Corea MD  2024

## 2024-05-08 ENCOUNTER — ROUTINE PRENATAL (OUTPATIENT)
Dept: OBSTETRICS AND GYNECOLOGY | Facility: CLINIC | Age: 24
End: 2024-05-08
Payer: COMMERCIAL

## 2024-05-08 VITALS — DIASTOLIC BLOOD PRESSURE: 80 MMHG | WEIGHT: 148.6 LBS | SYSTOLIC BLOOD PRESSURE: 112 MMHG | BODY MASS INDEX: 30.01 KG/M2

## 2024-05-08 DIAGNOSIS — D50.9 IRON DEFICIENCY ANEMIA DURING PREGNANCY: ICD-10-CM

## 2024-05-08 DIAGNOSIS — Z12.4 SCREENING FOR CERVICAL CANCER: ICD-10-CM

## 2024-05-08 DIAGNOSIS — O34.219 PREVIOUS CESAREAN SECTION COMPLICATING PREGNANCY: ICD-10-CM

## 2024-05-08 DIAGNOSIS — O99.019 IRON DEFICIENCY ANEMIA DURING PREGNANCY: ICD-10-CM

## 2024-05-08 DIAGNOSIS — Z34.83 PRENATAL CARE, SUBSEQUENT PREGNANCY, THIRD TRIMESTER: ICD-10-CM

## 2024-05-08 DIAGNOSIS — R87.612 LGSIL ON PAP SMEAR OF CERVIX: ICD-10-CM

## 2024-05-08 DIAGNOSIS — O36.60X0 EXCESSIVE FETAL GROWTH AFFECTING MANAGEMENT OF PREGNANCY, ANTEPARTUM, SINGLE OR UNSPECIFIED FETUS: ICD-10-CM

## 2024-05-08 DIAGNOSIS — O09.299 HISTORY OF FETAL ANOMALY IN PRIOR PREGNANCY, CURRENTLY PREGNANT: ICD-10-CM

## 2024-05-08 DIAGNOSIS — Z34.90 PREGNANCY, UNSPECIFIED GESTATIONAL AGE: Primary | ICD-10-CM

## 2024-05-08 DIAGNOSIS — F41.1 GENERALIZED ANXIETY DISORDER: ICD-10-CM

## 2024-05-08 LAB
GLUCOSE UR STRIP-MCNC: NEGATIVE MG/DL
PROT UR STRIP-MCNC: NEGATIVE MG/DL

## 2024-05-08 RX ORDER — UREA 10 %
325 LOTION (ML) TOPICAL
Qty: 30 TABLET | Refills: 3 | Status: SHIPPED | OUTPATIENT
Start: 2024-05-08 | End: 2025-05-08

## 2024-05-13 ENCOUNTER — TELEPHONE (OUTPATIENT)
Dept: OBSTETRICS AND GYNECOLOGY | Facility: CLINIC | Age: 24
End: 2024-05-13

## 2024-05-13 NOTE — TELEPHONE ENCOUNTER
Caller: Kristel Fernandez    Relationship: Self    Best call back number: [phone]      Who are you requesting to speak with (clinical staff, DR. FERNANDEZ      What was the call regarding: PATIENT ADVISED THAT SHE MIGHT NOT HAVE A RIDE ON  5/20/24 TO HER PAT APPT, WOULD LIKE TO KNOW IF PAT APPT CAN BE CHANGED TO THIS WEDNESDAY  5/15/24 . PLEASE ADVISE

## 2024-05-15 ENCOUNTER — PREP FOR SURGERY (OUTPATIENT)
Dept: OTHER | Facility: HOSPITAL | Age: 24
End: 2024-05-15
Payer: COMMERCIAL

## 2024-05-15 ENCOUNTER — ROUTINE PRENATAL (OUTPATIENT)
Dept: OBSTETRICS AND GYNECOLOGY | Facility: CLINIC | Age: 24
End: 2024-05-15
Payer: COMMERCIAL

## 2024-05-15 VITALS — SYSTOLIC BLOOD PRESSURE: 122 MMHG | DIASTOLIC BLOOD PRESSURE: 60 MMHG | BODY MASS INDEX: 30.78 KG/M2 | WEIGHT: 152.4 LBS

## 2024-05-15 DIAGNOSIS — F41.1 GENERALIZED ANXIETY DISORDER: ICD-10-CM

## 2024-05-15 DIAGNOSIS — O09.299 HISTORY OF FETAL ANOMALY IN PRIOR PREGNANCY, CURRENTLY PREGNANT: ICD-10-CM

## 2024-05-15 DIAGNOSIS — O99.019 IRON DEFICIENCY ANEMIA DURING PREGNANCY: ICD-10-CM

## 2024-05-15 DIAGNOSIS — R87.612 LGSIL ON PAP SMEAR OF CERVIX: ICD-10-CM

## 2024-05-15 DIAGNOSIS — Z34.83 PRENATAL CARE, SUBSEQUENT PREGNANCY, THIRD TRIMESTER: ICD-10-CM

## 2024-05-15 DIAGNOSIS — O34.219 PREVIOUS CESAREAN SECTION COMPLICATING PREGNANCY: Primary | ICD-10-CM

## 2024-05-15 DIAGNOSIS — D50.9 IRON DEFICIENCY ANEMIA DURING PREGNANCY: ICD-10-CM

## 2024-05-15 DIAGNOSIS — O36.60X0 EXCESSIVE FETAL GROWTH AFFECTING MANAGEMENT OF PREGNANCY, ANTEPARTUM, SINGLE OR UNSPECIFIED FETUS: ICD-10-CM

## 2024-05-15 DIAGNOSIS — Z34.90 PREGNANCY, UNSPECIFIED GESTATIONAL AGE: ICD-10-CM

## 2024-05-15 DIAGNOSIS — Z12.4 SCREENING FOR CERVICAL CANCER: ICD-10-CM

## 2024-05-15 LAB
GLUCOSE UR STRIP-MCNC: NEGATIVE MG/DL
PROT UR STRIP-MCNC: NEGATIVE MG/DL

## 2024-05-15 RX ORDER — CARBOPROST TROMETHAMINE 250 UG/ML
250 INJECTION, SOLUTION INTRAMUSCULAR AS NEEDED
OUTPATIENT
Start: 2024-05-15

## 2024-05-15 RX ORDER — CITRIC ACID/SODIUM CITRATE 334-500MG
30 SOLUTION, ORAL ORAL ONCE
OUTPATIENT
Start: 2024-05-15 | End: 2024-05-15

## 2024-05-15 RX ORDER — METHYLERGONOVINE MALEATE 0.2 MG/ML
200 INJECTION INTRAVENOUS ONCE AS NEEDED
OUTPATIENT
Start: 2024-05-15

## 2024-05-15 RX ORDER — OXYTOCIN/0.9 % SODIUM CHLORIDE 30/500 ML
650 PLASTIC BAG, INJECTION (ML) INTRAVENOUS ONCE
OUTPATIENT
Start: 2024-05-15 | End: 2024-05-15

## 2024-05-15 RX ORDER — ACETAMINOPHEN 500 MG
1000 TABLET ORAL ONCE
OUTPATIENT
Start: 2024-05-15 | End: 2024-05-15

## 2024-05-15 RX ORDER — SODIUM CHLORIDE 9 MG/ML
40 INJECTION, SOLUTION INTRAVENOUS AS NEEDED
OUTPATIENT
Start: 2024-05-15

## 2024-05-15 RX ORDER — SODIUM CHLORIDE 0.9 % (FLUSH) 0.9 %
10 SYRINGE (ML) INJECTION AS NEEDED
OUTPATIENT
Start: 2024-05-15

## 2024-05-15 RX ORDER — BUPIVACAINE HCL/0.9 % NACL/PF 0.1 %
2 PLASTIC BAG, INJECTION (ML) EPIDURAL ONCE
OUTPATIENT
Start: 2024-05-15 | End: 2024-05-15

## 2024-05-15 RX ORDER — KETOROLAC TROMETHAMINE 30 MG/ML
30 INJECTION, SOLUTION INTRAMUSCULAR; INTRAVENOUS ONCE
OUTPATIENT
Start: 2024-05-15 | End: 2024-05-15

## 2024-05-15 RX ORDER — SODIUM CHLORIDE 0.9 % (FLUSH) 0.9 %
10 SYRINGE (ML) INJECTION EVERY 12 HOURS SCHEDULED
OUTPATIENT
Start: 2024-05-15

## 2024-05-15 RX ORDER — LIDOCAINE HYDROCHLORIDE 10 MG/ML
0.5 INJECTION, SOLUTION EPIDURAL; INFILTRATION; INTRACAUDAL; PERINEURAL ONCE AS NEEDED
OUTPATIENT
Start: 2024-05-15

## 2024-05-15 RX ORDER — OXYTOCIN/0.9 % SODIUM CHLORIDE 30/500 ML
85 PLASTIC BAG, INJECTION (ML) INTRAVENOUS ONCE
OUTPATIENT
Start: 2024-05-15 | End: 2024-05-15

## 2024-05-15 RX ORDER — SODIUM CHLORIDE, SODIUM LACTATE, POTASSIUM CHLORIDE, CALCIUM CHLORIDE 600; 310; 30; 20 MG/100ML; MG/100ML; MG/100ML; MG/100ML
125 INJECTION, SOLUTION INTRAVENOUS CONTINUOUS
OUTPATIENT
Start: 2024-05-15

## 2024-05-15 NOTE — PROGRESS NOTES
"    OB FOLLOW UP  CC- Here for care of pregnancy        Kristel Fernandez is a 23 y.o.  38w4d patient being seen today for her obstetrical follow up visit. Patient reports that her hands and feet feel \"tight and swell at times throughout the day\".       Her prenatal care is complicated by (and status) : see below.  Patient Active Problem List   Diagnosis    Trichomonal vaginitis    Vaginal discharge    Previous  section complicating pregnancy    BV (bacterial vaginosis)    Screening for cervical cancer    Pregnancy    Generalized anxiety disorder    History of fetal anomaly in prior pregnancy, currently pregnant    LGSIL on Pap smear of cervix    History of recurrent vaginal discharge    Bacterial vaginitis    Iron deficiency anemia during pregnancy    Breech presentation    Excessive fetal growth affecting management of pregnancy, antepartum       GBS Status: Neg  Group B Strep Culture   Date Value Ref Range Status   2024 No Group B Streptococcus isolated  Final         No Known Allergies       Flu Status: Already given in current flu season  TDAP HAS NOT BEEN GIVEN, NO OFFICE SUPPLY.  PT STATES SHE WILL GET AT [facility] TODAY  Her Delivery Plan is: Repeat . Scheduled  24 @ 1200, PAT 24 @0830   today: yes BPP   Non Stress Test: No.    ROS -   Patient Denies: Loss of Fluid, Vaginal Spotting, Vision Changes, Headaches, Nausea , Vomiting , Contractions, Epigastric pain, and skin itching  Fetal Movement : normal  All other systems reviewed and are negative.       The additional following portions of the patient's history were reviewed and updated as appropriate: allergies, current medications, past family history, past medical history, past social history, past surgical history, and problem list.    I have reviewed and agree with the HPI, ROS, and historical information as entered above.   Kurt Corea MD        EXAM:     Prenatal Vitals  BP: 122/60  Weight: 69.1 kg (152 lb " 6.4 oz)   Fetal Heart Rate: 132 US              Urine Glucose Read-only: Negative  Urine Protein Read-only: Negative           Assessment and Plan    Problem List Items Addressed This Visit          Gynecologic and Obstetric Problems    Iron deficiency anemia during pregnancy    Overview     Iron deficiency anemia.  Hematocrit 33% at 28 weeks.  Rx iron sulfate 325 mg every other day.         Relevant Medications    ferrous sulfate 325 (65 FE) MG EC tablet       Other    Breech presentation    Overview       2024; PDC ultrasound normal with breech presentation.  EFW 72%.   AC 84%.  2024; Breech presentation.  normal growth; EFW 54%; AC 74%         Excessive fetal growth affecting management of pregnancy, antepartum    Overview     2024; PDC ultrasound normal with presentation.  EFW 72%.   AC 84%.  2024; Breech presentation. normal growth; EFW 54%; AC 74%          Generalized anxiety disorder    Overview     2023.  Currently stable off medication.  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  EFW 72%.   AC 84%.         LGSIL on Pap smear of cervix    Overview     Pap smear 2023 with LGSIL.  Positive for trichomonas.and BV  Pregnant.  Colpo 1/10/2024 with biopsy at 12.  No ECC.  Biopsy with LGSIL.  Repeat Pap smear 6 weeks postpartum.         Pregnancy    Overview     Dates consistent with -7-week u/s  Prior  for breech.  History of fetal renal multicystic dysplasia right kidney.  Girl.  7 pounds 4 ounce.   ; Cell free DNA screen low risk; c/w male.  aFP was negative.    2024; PDC ultrasound normal; breech presentation.  EFW 72%.   AC 84%.         Previous  section complicating pregnancy - Primary    Overview     2022; primary  for  breech presentation.  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  7 pounds 4 ounce.    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     Repeat   at 12 noon.             Screening for cervical cancer    Overview     2023 Pap with HPV done.  Pap smear 2023 with LGSIL.  Positive for trichomonas.and BV     Colpo 1/10/2024 with biopsy at 12.  No ECC.  Biopsy with LGSIL.  Repeat Pap smear 6 weeks postpartum.          Other Visit Diagnoses       Prenatal care, subsequent pregnancy, third trimester        Relevant Orders    POC Urinalysis Dipstick (Completed)            Pregnancy at 38w4d; ultrasound today persistent breech presentation with normal BPP.  Iron deficiency anemia.  On iron sulfate 325 mg daily.  Fetal status reassuring.   Reviewed Pre-eclampsia signs/symptoms  Reviewed upcoming c/s and PAT instructions with patient. Arrival time and NPO status reviewed.   Repeat   at 12 noon. PAT is  @ 0830  Delivery options reviewed with patient  Signs of labor reviewed  Kick counts reviewed  Activity and Exercise discussed.  No follow-ups on file.    Kurt Corea MD  05/15/2024

## 2024-05-20 ENCOUNTER — PRE-ADMISSION TESTING (OUTPATIENT)
Dept: PREADMISSION TESTING | Facility: HOSPITAL | Age: 24
End: 2024-05-20
Payer: COMMERCIAL

## 2024-05-20 ENCOUNTER — ANESTHESIA EVENT (OUTPATIENT)
Dept: LABOR AND DELIVERY | Facility: HOSPITAL | Age: 24
End: 2024-05-20
Payer: COMMERCIAL

## 2024-05-20 VITALS — BODY MASS INDEX: 31.77 KG/M2 | WEIGHT: 151.35 LBS | HEIGHT: 58 IN

## 2024-05-20 DIAGNOSIS — O34.219 PREVIOUS CESAREAN SECTION COMPLICATING PREGNANCY: ICD-10-CM

## 2024-05-20 LAB
ABO GROUP BLD: NORMAL
ALP SERPL-CCNC: 239 U/L (ref 39–117)
ALT SERPL W P-5'-P-CCNC: 7 U/L (ref 1–33)
AST SERPL-CCNC: 18 U/L (ref 1–32)
BILIRUB SERPL-MCNC: 0.4 MG/DL (ref 0–1.2)
BLD GP AB SCN SERPL QL: NEGATIVE
CREAT SERPL-MCNC: 0.48 MG/DL (ref 0.57–1)
DEPRECATED RDW RBC AUTO: 41 FL (ref 37–54)
ERYTHROCYTE [DISTWIDTH] IN BLOOD BY AUTOMATED COUNT: 13.4 % (ref 12.3–15.4)
HCT VFR BLD AUTO: 34.3 % (ref 34–46.6)
HGB BLD-MCNC: 11.2 G/DL (ref 12–15.9)
LDH SERPL-CCNC: 180 U/L (ref 135–214)
MCH RBC QN AUTO: 27.6 PG (ref 26.6–33)
MCHC RBC AUTO-ENTMCNC: 32.7 G/DL (ref 31.5–35.7)
MCV RBC AUTO: 84.5 FL (ref 79–97)
PLATELET # BLD AUTO: 193 10*3/MM3 (ref 140–450)
PMV BLD AUTO: 10.1 FL (ref 6–12)
RBC # BLD AUTO: 4.06 10*6/MM3 (ref 3.77–5.28)
RH BLD: POSITIVE
T PALLIDUM IGG SER QL: NORMAL
T&S EXPIRATION DATE: NORMAL
URATE SERPL-MCNC: 4.5 MG/DL (ref 2.4–5.7)
WBC NRBC COR # BLD AUTO: 8.35 10*3/MM3 (ref 3.4–10.8)

## 2024-05-20 PROCEDURE — 86780 TREPONEMA PALLIDUM: CPT | Performed by: OBSTETRICS & GYNECOLOGY

## 2024-05-20 PROCEDURE — 84450 TRANSFERASE (AST) (SGOT): CPT

## 2024-05-20 PROCEDURE — 36415 COLL VENOUS BLD VENIPUNCTURE: CPT

## 2024-05-20 PROCEDURE — 82565 ASSAY OF CREATININE: CPT

## 2024-05-20 PROCEDURE — 86850 RBC ANTIBODY SCREEN: CPT

## 2024-05-20 PROCEDURE — 84550 ASSAY OF BLOOD/URIC ACID: CPT

## 2024-05-20 PROCEDURE — 85027 COMPLETE CBC AUTOMATED: CPT

## 2024-05-20 PROCEDURE — 84075 ASSAY ALKALINE PHOSPHATASE: CPT

## 2024-05-20 PROCEDURE — 86900 BLOOD TYPING SEROLOGIC ABO: CPT

## 2024-05-20 PROCEDURE — 82247 BILIRUBIN TOTAL: CPT

## 2024-05-20 PROCEDURE — 86901 BLOOD TYPING SEROLOGIC RH(D): CPT

## 2024-05-20 PROCEDURE — 84460 ALANINE AMINO (ALT) (SGPT): CPT

## 2024-05-20 PROCEDURE — 83615 LACTATE (LD) (LDH) ENZYME: CPT

## 2024-05-20 NOTE — PAT
An arrival time for procedure was not provided during PAT visit. If patient had any questions or concerns about their arrival time, they were instructed to contact their surgeon/physician.  Additionally, if the patient referred to an arrival time that was acquired from their my chart account, patient was encouraged to verify that time with their surgeon/physician. Arrival times are NOT provided in Pre Admission Testing Department.    Patient denies any current skin issues.     Patient to apply Chlorhexadine wipes  to surgical area (as instructed) the night before procedure and the AM of procedure. Wipes provided.    Patient viewed general PAT education video as instructed in their preoperative information received from their surgeon.  Patient stated the general PAT education video was viewed in its entirety and survey completed.  Copies of PAT general education handouts (Incentive Spirometry, Meds to Beds Program, Patient Belongings, Pre-op skin preparation instructions, Blood Glucose testing, Visitor policy, Surgery FAQ, Code H) distributed to patient if not printed. Education related to the PAT pass and skin preparation for surgery (if applicable) completed in PAT as a reinforcement to PAT education video. Patient instructed to return PAT pass provided today as well as completed skin preparation sheet (if applicable) on the day of procedure.     Additionally if patient had not viewed video yet but intended to view it at home or in our waiting area, then referred them to the handout with QR code/link provided during PAT visit.  Instructed patient to complete survey after viewing the video in its entirety.  Encouraged patient/family to read PAT general education handouts thoroughly and notify PAT staff with any questions or concerns. Patient verbalized understanding of all information and priority content.    Blood bank bracelet applied to patient during Pre Admission Testing visit.  Patient instructed not to remove  from arm until after procedure and they are discharged from the hospital.  Explained to patient that they may shower and get the bracelet wet, but not to immerse under water for longer periods (bathing, swimming, hand dishwashing, etc).  Patient verbalized understanding.    Patient instructed to drink 20 ounces of Gatorade or Gatorlyte (if diabetic) and it needs to be completed 1 hour (for Main OR patients) or 2 hours (scheduled  section & [facility] patients) before given arrival time for procedure (NO RED Gatorade and NO Gatorade Zero).    Patient verbalized understanding.    Instructed patient to take two Tylenol extra strength (total of 1000 mg) the night before surgery.

## 2024-05-20 NOTE — DISCHARGE INSTRUCTIONS
What to know before your arrive:    -Do not eat, drink or chew gum beginning 8 hours before your scheduled arrival time to the hospital.  Except please drink 20 ounces of Gatorade and complete two hours before your given arrival time to the hospital.  If you drink too close to surgery time, your sugery may  be delayed or cancelled.  Please complete as instructed.     -Do not shave any part of your body including abdomen or pelvic are for two days before your procedure.  -If you are taking a scheduled medication (insulin, blood pressure medicine,antibiotics) please consult with your physician whether to take on the day of surgery.  -Remove all jewelry including rings, wedding bands, and piercing before coming to the hospital.  -Leave important valuables at home.  -Do not wear dark fingernail polish.  -Please take two Tylenol 500 mg tablets the evening before surgery.  -Bring the following with you to the hospital:    -Picture ID and insurance, Medicare or Medicaid cards    -Co-pay/deductible required by insurance (Cash, Check, Credit Card)    -Copy of living will or power  document (if applicable)    -CPAP mask and tubing, not machine (if applicable)    -Skin prep instructions sheet    What to know the day of procedure:    -Park in the [facility], take elevator for first floor, exit to the right and  proceed through the doors to outside, follow the covered sidewalk to the entrance of the [place] [facility], [facility] and [facility], enter the [place] to your right BEFORE entering the 1720 lobby.  Take the elevators to the 3rd floor ([address] [place]).  -Leave unnecessary items in your vehicle, including your suitcase.  Your support  person or a family member can get it for you after your procedure.   -Check in at the reception desk in the lobby of the 3rd floor ([address] [place]).   -One person may accompany you to the pre-op/recovery area.  Please have  other family members wait in the  waiting room.   -An anesthesiologist will meet with your prior to your procedure.   -After anesthesia has been initiated, one person may accompany you in the  operating room.   -No video cameras are permitted in the operating room; only still cameras,  Please.      What to expect while you are in recovery:     -One person may stay with you while you are in recovery.   -If the baby is stable, he/she may visit to initiate breastfeeding & [facility].      CHLORHEXIDINE GLUCONATE WIPES AND INSTRUCTIONS GIVEN TO PATIENT

## 2024-05-21 ENCOUNTER — HOSPITAL ENCOUNTER (INPATIENT)
Facility: HOSPITAL | Age: 24
LOS: 2 days | Discharge: HOME | End: 2024-05-23
Attending: OBSTETRICS & GYNECOLOGY | Admitting: OBSTETRICS & GYNECOLOGY
Payer: COMMERCIAL

## 2024-05-21 ENCOUNTER — ANESTHESIA (OUTPATIENT)
Dept: LABOR AND DELIVERY | Facility: HOSPITAL | Age: 24
End: 2024-05-21
Payer: COMMERCIAL

## 2024-05-21 DIAGNOSIS — O34.219 PREVIOUS CESAREAN SECTION COMPLICATING PREGNANCY: ICD-10-CM

## 2024-05-21 PROCEDURE — 25010000002 BUPIVACAINE IN DEXTROSE 0.75-8.25 % SOLUTION: Performed by: NURSE ANESTHETIST, CERTIFIED REGISTERED

## 2024-05-21 PROCEDURE — 59025 FETAL NON-STRESS TEST: CPT

## 2024-05-21 PROCEDURE — 25810000003 LACTATED RINGERS PER 1000 ML: Performed by: OBSTETRICS & GYNECOLOGY

## 2024-05-21 PROCEDURE — 25010000002 KETOROLAC TROMETHAMINE PER 15 MG: Performed by: OBSTETRICS & GYNECOLOGY

## 2024-05-21 PROCEDURE — 25010000002 ONDANSETRON PER 1 MG: Performed by: NURSE ANESTHETIST, CERTIFIED REGISTERED

## 2024-05-21 PROCEDURE — 25010000002 MIDAZOLAM PER 1 MG: Performed by: NURSE ANESTHETIST, CERTIFIED REGISTERED

## 2024-05-21 PROCEDURE — 25810000003 LACTATED RINGERS SOLUTION: Performed by: OBSTETRICS & GYNECOLOGY

## 2024-05-21 PROCEDURE — 25010000002 MORPHINE PER 10 MG: Performed by: NURSE ANESTHETIST, CERTIFIED REGISTERED

## 2024-05-21 PROCEDURE — 25010000002 CEFAZOLIN PER 500 MG: Performed by: OBSTETRICS & GYNECOLOGY

## 2024-05-21 PROCEDURE — 25010000002 FENTANYL CITRATE (PF) 100 MCG/2ML SOLUTION: Performed by: NURSE ANESTHETIST, CERTIFIED REGISTERED

## 2024-05-21 PROCEDURE — 25010000002 PROMETHAZINE PER 50 MG: Performed by: NURSE ANESTHETIST, CERTIFIED REGISTERED

## 2024-05-21 PROCEDURE — 59515 CESAREAN DELIVERY: CPT | Performed by: OBSTETRICS & GYNECOLOGY

## 2024-05-21 RX ORDER — LIDOCAINE HYDROCHLORIDE 10 MG/ML
0.5 INJECTION, SOLUTION EPIDURAL; INFILTRATION; INTRACAUDAL; PERINEURAL ONCE AS NEEDED
Status: DISCONTINUED | OUTPATIENT
Start: 2024-05-21 | End: 2024-05-21 | Stop reason: HOSPADM

## 2024-05-21 RX ORDER — DIPHENHYDRAMINE HYDROCHLORIDE 50 MG/ML
25 INJECTION INTRAMUSCULAR; INTRAVENOUS ONCE AS NEEDED
Status: DISCONTINUED | OUTPATIENT
Start: 2024-05-21 | End: 2024-05-23 | Stop reason: HOSPADM

## 2024-05-21 RX ORDER — DIPHENHYDRAMINE HCL 25 MG
25 CAPSULE ORAL EVERY 4 HOURS PRN
Status: DISCONTINUED | OUTPATIENT
Start: 2024-05-21 | End: 2024-05-23 | Stop reason: HOSPADM

## 2024-05-21 RX ORDER — PHENYLEPHRINE HCL IN 0.9% NACL 1 MG/10 ML
SYRINGE (ML) INTRAVENOUS AS NEEDED
Status: DISCONTINUED | OUTPATIENT
Start: 2024-05-21 | End: 2024-05-21 | Stop reason: SURG

## 2024-05-21 RX ORDER — NALOXONE HCL 0.4 MG/ML
0.4 VIAL (ML) INJECTION
Status: ACTIVE | OUTPATIENT
Start: 2024-05-21 | End: 2024-05-22

## 2024-05-21 RX ORDER — PRENATAL VIT/IRON FUM/FOLIC AC 27MG-0.8MG
1 TABLET ORAL DAILY
Status: DISCONTINUED | OUTPATIENT
Start: 2024-05-21 | End: 2024-05-23 | Stop reason: HOSPADM

## 2024-05-21 RX ORDER — ACETAMINOPHEN 325 MG/1
650 TABLET ORAL EVERY 6 HOURS
Status: DISCONTINUED | OUTPATIENT
Start: 2024-05-22 | End: 2024-05-23 | Stop reason: HOSPADM

## 2024-05-21 RX ORDER — ONDANSETRON 2 MG/ML
4 INJECTION INTRAMUSCULAR; INTRAVENOUS ONCE AS NEEDED
Status: DISCONTINUED | OUTPATIENT
Start: 2024-05-21 | End: 2024-05-21

## 2024-05-21 RX ORDER — ALUMINA, MAGNESIA, AND SIMETHICONE 2400; 2400; 240 MG/30ML; MG/30ML; MG/30ML
15 SUSPENSION ORAL EVERY 4 HOURS PRN
Status: DISCONTINUED | OUTPATIENT
Start: 2024-05-21 | End: 2024-05-23 | Stop reason: HOSPADM

## 2024-05-21 RX ORDER — CITRIC ACID/SODIUM CITRATE 334-500MG
30 SOLUTION, ORAL ORAL ONCE
Status: COMPLETED | OUTPATIENT
Start: 2024-05-21 | End: 2024-05-21

## 2024-05-21 RX ORDER — KETOROLAC TROMETHAMINE 30 MG/ML
30 INJECTION, SOLUTION INTRAMUSCULAR; INTRAVENOUS ONCE
Qty: 1 ML | Refills: 0 | Status: COMPLETED | OUTPATIENT
Start: 2024-05-21 | End: 2024-05-21

## 2024-05-21 RX ORDER — METHYLERGONOVINE MALEATE 0.2 MG/ML
200 INJECTION INTRAVENOUS ONCE AS NEEDED
Status: DISCONTINUED | OUTPATIENT
Start: 2024-05-21 | End: 2024-05-23 | Stop reason: HOSPADM

## 2024-05-21 RX ORDER — ONDANSETRON 2 MG/ML
INJECTION INTRAMUSCULAR; INTRAVENOUS AS NEEDED
Status: DISCONTINUED | OUTPATIENT
Start: 2024-05-21 | End: 2024-05-21 | Stop reason: SURG

## 2024-05-21 RX ORDER — SODIUM CHLORIDE 0.9 % (FLUSH) 0.9 %
10 SYRINGE (ML) INJECTION EVERY 12 HOURS SCHEDULED
Status: DISCONTINUED | OUTPATIENT
Start: 2024-05-21 | End: 2024-05-21 | Stop reason: HOSPADM

## 2024-05-21 RX ORDER — BUPIVACAINE HYDROCHLORIDE 7.5 MG/ML
INJECTION, SOLUTION INTRASPINAL AS NEEDED
Status: DISCONTINUED | OUTPATIENT
Start: 2024-05-21 | End: 2024-05-21 | Stop reason: SURG

## 2024-05-21 RX ORDER — CALCIUM CARBONATE 500 MG/1
1 TABLET, CHEWABLE ORAL EVERY 4 HOURS PRN
Status: DISCONTINUED | OUTPATIENT
Start: 2024-05-21 | End: 2024-05-23 | Stop reason: HOSPADM

## 2024-05-21 RX ORDER — MORPHINE SULFATE 0.5 MG/ML
INJECTION, SOLUTION EPIDURAL; INTRATHECAL; INTRAVENOUS AS NEEDED
Status: DISCONTINUED | OUTPATIENT
Start: 2024-05-21 | End: 2024-05-21 | Stop reason: SURG

## 2024-05-21 RX ORDER — ONDANSETRON 2 MG/ML
4 INJECTION INTRAMUSCULAR; INTRAVENOUS ONCE
Status: COMPLETED | OUTPATIENT
Start: 2024-05-21 | End: 2024-05-21

## 2024-05-21 RX ORDER — OXYCODONE HYDROCHLORIDE 5 MG/1
5 TABLET ORAL EVERY 4 HOURS PRN
Status: DISCONTINUED | OUTPATIENT
Start: 2024-05-21 | End: 2024-05-23 | Stop reason: HOSPADM

## 2024-05-21 RX ORDER — CARBOPROST TROMETHAMINE 250 UG/ML
250 INJECTION, SOLUTION INTRAMUSCULAR AS NEEDED
Status: DISCONTINUED | OUTPATIENT
Start: 2024-05-21 | End: 2024-05-21 | Stop reason: HOSPADM

## 2024-05-21 RX ORDER — ONDANSETRON 2 MG/ML
4 INJECTION INTRAMUSCULAR; INTRAVENOUS EVERY 6 HOURS PRN
Status: DISCONTINUED | OUTPATIENT
Start: 2024-05-21 | End: 2024-05-23 | Stop reason: HOSPADM

## 2024-05-21 RX ORDER — PROMETHAZINE HYDROCHLORIDE 12.5 MG/1
12.5 TABLET ORAL EVERY 4 HOURS PRN
Status: DISCONTINUED | OUTPATIENT
Start: 2024-05-21 | End: 2024-05-23 | Stop reason: HOSPADM

## 2024-05-21 RX ORDER — FENTANYL CITRATE 50 UG/ML
INJECTION, SOLUTION INTRAMUSCULAR; INTRAVENOUS AS NEEDED
Status: DISCONTINUED | OUTPATIENT
Start: 2024-05-21 | End: 2024-05-21 | Stop reason: SURG

## 2024-05-21 RX ORDER — METHYLERGONOVINE MALEATE 0.2 MG/ML
200 INJECTION INTRAVENOUS ONCE AS NEEDED
Status: DISCONTINUED | OUTPATIENT
Start: 2024-05-21 | End: 2024-05-21 | Stop reason: HOSPADM

## 2024-05-21 RX ORDER — HYDROCORTISONE 25 MG/G
1 CREAM TOPICAL AS NEEDED
Status: DISCONTINUED | OUTPATIENT
Start: 2024-05-21 | End: 2024-05-23 | Stop reason: HOSPADM

## 2024-05-21 RX ORDER — OXYTOCIN/0.9 % SODIUM CHLORIDE 30/500 ML
125 PLASTIC BAG, INJECTION (ML) INTRAVENOUS ONCE AS NEEDED
Status: DISCONTINUED | OUTPATIENT
Start: 2024-05-21 | End: 2024-05-23 | Stop reason: HOSPADM

## 2024-05-21 RX ORDER — KETOROLAC TROMETHAMINE 15 MG/ML
15 INJECTION, SOLUTION INTRAMUSCULAR; INTRAVENOUS EVERY 6 HOURS
Status: COMPLETED | OUTPATIENT
Start: 2024-05-21 | End: 2024-05-22

## 2024-05-21 RX ORDER — FAMOTIDINE 10 MG/ML
INJECTION, SOLUTION INTRAVENOUS AS NEEDED
Status: DISCONTINUED | OUTPATIENT
Start: 2024-05-21 | End: 2024-05-21 | Stop reason: SURG

## 2024-05-21 RX ORDER — DIPHENHYDRAMINE HYDROCHLORIDE 50 MG/ML
25 INJECTION INTRAMUSCULAR; INTRAVENOUS EVERY 4 HOURS PRN
Status: DISCONTINUED | OUTPATIENT
Start: 2024-05-21 | End: 2024-05-23 | Stop reason: HOSPADM

## 2024-05-21 RX ORDER — MIDAZOLAM HYDROCHLORIDE 1 MG/ML
INJECTION INTRAMUSCULAR; INTRAVENOUS AS NEEDED
Status: DISCONTINUED | OUTPATIENT
Start: 2024-05-21 | End: 2024-05-21 | Stop reason: SURG

## 2024-05-21 RX ORDER — ACETAMINOPHEN 500 MG
1000 TABLET ORAL ONCE
Status: COMPLETED | OUTPATIENT
Start: 2024-05-21 | End: 2024-05-21

## 2024-05-21 RX ORDER — DOCUSATE SODIUM 100 MG/1
100 CAPSULE, LIQUID FILLED ORAL 2 TIMES DAILY PRN
Status: DISCONTINUED | OUTPATIENT
Start: 2024-05-21 | End: 2024-05-23 | Stop reason: HOSPADM

## 2024-05-21 RX ORDER — SIMETHICONE 80 MG
80 TABLET,CHEWABLE ORAL 4 TIMES DAILY PRN
Status: DISCONTINUED | OUTPATIENT
Start: 2024-05-21 | End: 2024-05-23 | Stop reason: HOSPADM

## 2024-05-21 RX ORDER — SODIUM CHLORIDE, SODIUM LACTATE, POTASSIUM CHLORIDE, CALCIUM CHLORIDE 600; 310; 30; 20 MG/100ML; MG/100ML; MG/100ML; MG/100ML
125 INJECTION, SOLUTION INTRAVENOUS CONTINUOUS
Status: DISCONTINUED | OUTPATIENT
Start: 2024-05-21 | End: 2024-05-23 | Stop reason: HOSPADM

## 2024-05-21 RX ORDER — OXYCODONE HYDROCHLORIDE 10 MG/1
10 TABLET ORAL EVERY 4 HOURS PRN
Status: DISCONTINUED | OUTPATIENT
Start: 2024-05-21 | End: 2024-05-23 | Stop reason: HOSPADM

## 2024-05-21 RX ORDER — SODIUM CHLORIDE 9 MG/ML
40 INJECTION, SOLUTION INTRAVENOUS AS NEEDED
Status: DISCONTINUED | OUTPATIENT
Start: 2024-05-21 | End: 2024-05-21 | Stop reason: HOSPADM

## 2024-05-21 RX ORDER — ACETAMINOPHEN 500 MG
1000 TABLET ORAL EVERY 6 HOURS
Status: COMPLETED | OUTPATIENT
Start: 2024-05-21 | End: 2024-05-22

## 2024-05-21 RX ORDER — OXYTOCIN/0.9 % SODIUM CHLORIDE 30/500 ML
650 PLASTIC BAG, INJECTION (ML) INTRAVENOUS ONCE
Qty: 500 ML | Refills: 0 | Status: DISCONTINUED | OUTPATIENT
Start: 2024-05-21 | End: 2024-05-21 | Stop reason: HOSPADM

## 2024-05-21 RX ORDER — OXYTOCIN/0.9 % SODIUM CHLORIDE 30/500 ML
85 PLASTIC BAG, INJECTION (ML) INTRAVENOUS ONCE
Qty: 500 ML | Refills: 0 | Status: DISCONTINUED | OUTPATIENT
Start: 2024-05-21 | End: 2024-05-21 | Stop reason: HOSPADM

## 2024-05-21 RX ORDER — ACETAMINOPHEN 500 MG
1000 TABLET ORAL EVERY 6 HOURS PRN
COMMUNITY

## 2024-05-21 RX ORDER — CARBOPROST TROMETHAMINE 250 UG/ML
250 INJECTION, SOLUTION INTRAMUSCULAR AS NEEDED
Status: DISCONTINUED | OUTPATIENT
Start: 2024-05-21 | End: 2024-05-23 | Stop reason: HOSPADM

## 2024-05-21 RX ORDER — SODIUM CHLORIDE 0.9 % (FLUSH) 0.9 %
10 SYRINGE (ML) INJECTION AS NEEDED
Status: DISCONTINUED | OUTPATIENT
Start: 2024-05-21 | End: 2024-05-21 | Stop reason: HOSPADM

## 2024-05-21 RX ORDER — IBUPROFEN 600 MG/1
600 TABLET ORAL EVERY 6 HOURS
Status: DISCONTINUED | OUTPATIENT
Start: 2024-05-22 | End: 2024-05-23 | Stop reason: HOSPADM

## 2024-05-21 RX ADMIN — BUPIVACAINE HYDROCHLORIDE IN DEXTROSE 1.3 ML: 7.5 INJECTION, SOLUTION SUBARACHNOID at 12:44

## 2024-05-21 RX ADMIN — ACETAMINOPHEN 1000 MG: 500 TABLET ORAL at 11:32

## 2024-05-21 RX ADMIN — MORPHINE SULFATE 150 MCG: 0.5 INJECTION, SOLUTION EPIDURAL; INTRATHECAL; INTRAVENOUS at 12:44

## 2024-05-21 RX ADMIN — SODIUM CHLORIDE, POTASSIUM CHLORIDE, SODIUM LACTATE AND CALCIUM CHLORIDE: 600; 310; 30; 20 INJECTION, SOLUTION INTRAVENOUS at 12:19

## 2024-05-21 RX ADMIN — MIDAZOLAM HYDROCHLORIDE 1 MG: 1 INJECTION, SOLUTION INTRAMUSCULAR; INTRAVENOUS at 12:22

## 2024-05-21 RX ADMIN — PROMETHAZINE HYDROCHLORIDE 12.5 MG: 25 INJECTION INTRAMUSCULAR; INTRAVENOUS at 16:16

## 2024-05-21 RX ADMIN — SODIUM CHLORIDE, POTASSIUM CHLORIDE, SODIUM LACTATE AND CALCIUM CHLORIDE: 600; 310; 30; 20 INJECTION, SOLUTION INTRAVENOUS at 12:37

## 2024-05-21 RX ADMIN — SODIUM CHLORIDE, POTASSIUM CHLORIDE, SODIUM LACTATE AND CALCIUM CHLORIDE: 600; 310; 30; 20 INJECTION, SOLUTION INTRAVENOUS at 13:38

## 2024-05-21 RX ADMIN — ONDANSETRON 4 MG: 2 INJECTION INTRAMUSCULAR; INTRAVENOUS at 14:45

## 2024-05-21 RX ADMIN — KETOROLAC TROMETHAMINE 30 MG: 30 INJECTION, SOLUTION INTRAMUSCULAR; INTRAVENOUS at 14:45

## 2024-05-21 RX ADMIN — SODIUM CHLORIDE 2 G: 900 INJECTION INTRAVENOUS at 12:17

## 2024-05-21 RX ADMIN — KETOROLAC TROMETHAMINE 15 MG: 15 INJECTION, SOLUTION INTRAMUSCULAR; INTRAVENOUS at 21:25

## 2024-05-21 RX ADMIN — Medication 200 MCG: at 12:54

## 2024-05-21 RX ADMIN — Medication 200 MCG: at 12:58

## 2024-05-21 RX ADMIN — SODIUM CHLORIDE, POTASSIUM CHLORIDE, SODIUM LACTATE AND CALCIUM CHLORIDE 1000 ML: 600; 310; 30; 20 INJECTION, SOLUTION INTRAVENOUS at 11:35

## 2024-05-21 RX ADMIN — Medication 30 ML: at 12:17

## 2024-05-21 RX ADMIN — Medication 100 MCG: at 12:50

## 2024-05-21 RX ADMIN — MIDAZOLAM HYDROCHLORIDE 1 MG: 1 INJECTION, SOLUTION INTRAMUSCULAR; INTRAVENOUS at 13:16

## 2024-05-21 RX ADMIN — ACETAMINOPHEN 1000 MG: 500 TABLET ORAL at 18:06

## 2024-05-21 RX ADMIN — FENTANYL CITRATE 20 MCG: 50 INJECTION, SOLUTION INTRAMUSCULAR; INTRAVENOUS at 12:44

## 2024-05-21 RX ADMIN — FAMOTIDINE 20 MG: 10 INJECTION, SOLUTION INTRAVENOUS at 12:22

## 2024-05-21 RX ADMIN — ONDANSETRON 4 MG: 2 INJECTION INTRAMUSCULAR; INTRAVENOUS at 12:22

## 2024-05-21 NOTE — ANESTHESIA PROCEDURE NOTES
Spinal Block      Patient reassessed immediately prior to procedure    Patient location during procedure: OR  Indication:at surgeon's request  Performed By  CRNA/CAA: Muna Bui CRNA  Preanesthetic Checklist  Completed: patient identified, IV checked, site marked, risks and benefits discussed, surgical consent, monitors and equipment checked, pre-op evaluation and timeout performed  Spinal Block Prep:  Patient Position:sitting  Sterile Tech:cap, gloves, mask and sterile barriers  Prep:Betadine  Patient Monitoring:blood pressure monitoring, continuous pulse oximetry and EKG    Spinal Block Procedure  Approach:midline  Guidance:palpation technique  Location:L2-L3  Needle Type:Kayla  Needle Gauge:25 G  Placement of Spinal needle event:cerebrospinal fluid aspirated  Paresthesia: no  Fluid Appearance:clear     Post Assessment  Patient Tolerance:patient tolerated the procedure well with no apparent complications  Complications no  Additional Notes  Multiple attempts with 25g kayla, +CSF X 2 but no aspiration.  CSE approach X 1 but no CSF with loss of resistance.  Dr. Garcia placed via CSE approach.

## 2024-05-21 NOTE — ANESTHESIA PREPROCEDURE EVALUATION
Anesthesia Evaluation     Patient summary reviewed and Nursing notes reviewed   NPO Solid Status: > 8 hours  NPO Liquid Status: > 2 hours           Airway   Mallampati: II  TM distance: >3 FB  Neck ROM: full  Dental      Pulmonary - negative pulmonary ROS   Cardiovascular - negative cardio ROS        Neuro/Psych- negative ROS  GI/Hepatic/Renal/Endo - negative ROS   (+) GERD    Musculoskeletal (-) negative ROS    Abdominal    Substance History - negative use     OB/GYN negative ob/gyn ROS   (+) Pregnant        Other - negative ROS                   Anesthesia Plan    ASA 2     ITN and spinal       Anesthetic plan, risks, benefits, and alternatives have been provided, discussed and informed consent has been obtained with: patient.    Use of blood products discussed with patient .    Plan discussed with attending.    CODE STATUS:

## 2024-05-21 NOTE — H&P
"History and Physical  [facility]    Chief Complaint   Patient presents with    Scheduled        Kristel Fernandez is a 23 y.o. year old  with an Estimated Date of Delivery: 24 currently at 39w3d presenting with no complaints, normal fetal movement, no contractions, no leaking, and no vaginal bleeding.    Prenatal care has been with Dr. Kurt Corea MD.  It has been significant for previous C/S - (desires repeat ).  The patient does not desire Bilateral Tubal Ligation.     No Additional Complaints Reported    The following portions of the patient's history were reviewed and updated as appropriate:vital signs, allergies, current medications, past medical history, past social history, past surgical history, and problem list.    Review of Systems  Pertinent items are noted in HPI.     Objective     Ht 149.9 cm (59\")   Wt 68.5 kg (151 lb)   LMP 2023   Breastfeeding Yes   BMI 30.50 kg/m²     Physical Exam    General:  well developed; well nourished  no acute distress           Abdomen: soft, non-tender; no masses  fundus firm and non-tender       FHT's: reactive and category 1   Cervix: Not examined   [place]: Contraction are rare     Lab Review   Labs: CBC   Lab Results (last 24 hours)       ** No results found for the last 24 hours. **            Lab Results   Component Value Date    HGB 11.2 (L) 2024    HEPBSAG Negative 2023    ABO O 2024    RH Positive 2024    ABSCRN Negative 2024    [id number] Non Reactive 2023    HEPCVIRUSABY Non Reactive 2023    URINECX Final report 2023         ASSESSMENT  IUP at 39w3d  Problem List Items Addressed This Visit       Previous  section complicating pregnancy    Overview     2022; primary  for breech presentation.  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  7 pounds 4 ounce.    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     Repeat   at " 12 noon.             Relevant Medications    sodium chloride 0.9 % flush 10 mL    sodium chloride 0.9 % flush 10 mL    sodium chloride 0.9 % infusion 40 mL    lidocaine PF 1% (XYLOCAINE) injection 0.5 mL    lactated ringers bolus 1,000 mL (Completed)    lactated ringers infusion    Sod Citrate-Citric Acid (BICITRA) oral solution 30 mL    acetaminophen (TYLENOL) tablet 1,000 mg (Completed)    ceFAZolin 2000 mg IVPB in 100 mL NS (MBP)   History of prior  for breech.  Desires repeat .    PLAN  Repeat LTCS         Kurt Corea MD  2:14 EDT

## 2024-05-21 NOTE — PAYOR COMM NOTE
"Kristel Fernandez (23 y.o. Female)     [facility] ID#[id number]     Delivery Information:  C Section 24 @ 13:08  Wt 3970 gms  Male  Apgars 7/9    From:Geraldine Valdez LPN, Utilization Review  Phone #[phone]  Fax #[phone]        Date of Birth   2000    Social Security Number       Address   [address]  [address] [place] [address]    Home Phone   [phone]    MRN   [id number]       Restorationism   None    Marital Status   Single                            Admission Date   24    Admission Type   Elective    Admitting Provider   Kurt Corea MD    Attending Provider   Kurt Corea MD    Department, Room/Bed   [facility] LABOR DELIVERY, [facility]       Discharge Date       Discharge Disposition       Discharge Destination                                 Attending Provider: Kurt Corea MD    Allergies: No Known Allergies    Isolation: None   Infection: None   Code Status: CPR    Ht: 149.9 cm (59\")   Wt: 68.5 kg (151 lb)    Admission Cmt: None   Principal Problem: Delivery by  section using transverse incision of lower segment of uterus [O82]                   Active Insurance as of 2024       Primary Coverage       Payor Plan Insurance Group Employer/Plan Group    [employer] [place] [employer] [place]        Payor Plan Address Payor Plan Phone Number Payor Plan Fax Number Effective Dates    [address] [address] [phone]  11/10/2021 - None Entered    [facility] [id number]         Subscriber Name Subscriber Birth Date Member ID       [id number] MIRACLE 2000 [id number]                     Emergency Contacts        (Rel.) Home Phone Work Phone Mobile Phone    LAUREN DARLING (Mother) [phone] -- [phone]              Insurance Information                  [facility]/WELLCARE MEDICAID Phone: [phone]    Subscriber: Kristel Fernandez Subscriber#: [id number]    Group#: -- Precert#: --             History & " "Physical        Kurt Corea MD at 24 1213          History and Physical  [facility]    Chief Complaint   Patient presents with    Scheduled        Kristel Fernandez is a 23 y.o. year old  with an Estimated Date of Delivery: 24 currently at 39w3d presenting with no complaints, normal fetal movement, no contractions, no leaking, and no vaginal bleeding.    Prenatal care has been with Dr. Kurt Corea MD.  It has been significant for previous C/S - (desires repeat ).  The patient does not desire Bilateral Tubal Ligation.     No Additional Complaints Reported    The following portions of the patient's history were reviewed and updated as appropriate:vital signs, allergies, current medications, past medical history, past social history, past surgical history, and problem list.    Review of Systems  Pertinent items are noted in HPI.     Objective    Ht 149.9 cm (59\")   Wt 68.5 kg (151 lb)   LMP 2023   Breastfeeding Yes   BMI 30.50 kg/m²     Physical Exam    General:  well developed; well nourished  no acute distress           Abdomen: soft, non-tender; no masses  fundus firm and non-tender       FHT's: reactive and category 1   Cervix: Not examined   [place]: Contraction are rare     Lab Review   Labs: CBC   Lab Results (last 24 hours)       ** No results found for the last 24 hours. **            Lab Results   Component Value Date    HGB 11.2 (L) 2024    HEPBSAG Negative 2023    ABO O 2024    RH Positive 2024    ABSCRN Negative 2024    [id number] Non Reactive 2023    HEPCVIRUSABY Non Reactive 2023    URINECX Final report 2023         ASSESSMENT  IUP at 39w3d  Problem List Items Addressed This Visit       Previous  section complicating pregnancy    Overview     2022; primary  for breech presentation.  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  7 pounds 4 " ounce.    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     Repeat   at 12 noon.             Relevant Medications    sodium chloride 0.9 % flush 10 mL    sodium chloride 0.9 % flush 10 mL    sodium chloride 0.9 % infusion 40 mL    lidocaine PF 1% (XYLOCAINE) injection 0.5 mL    lactated ringers bolus 1,000 mL (Completed)    lactated ringers infusion    Sod Citrate-Citric Acid (BICITRA) oral solution 30 mL    acetaminophen (TYLENOL) tablet 1,000 mg (Completed)    ceFAZolin 2000 mg IVPB in 100 mL NS (MBP)   History of prior  for breech.  Desires repeat .    PLAN  Repeat LTCS         Kurt Corea MD  412:14 EDT      Electronically signed by Kurt Corea MD at 24 1215       Facility-Administered Medications as of 2024   Medication Dose Route Frequency Provider Last Rate Last Admin    [COMPLETED] acetaminophen (TYLENOL) tablet 1,000 mg  1,000 mg Oral Once Kurt Corea MD   1,000 mg at 24 1132    carboprost (HEMABATE) injection 250 mcg  250 mcg Intramuscular PRN Kurt Corea MD        [COMPLETED] ceFAZolin 2000 mg IVPB in 100 mL NS (MBP)  2 g Intravenous Once Kurt Corea MD   2 g at 24 1217    diphenhydrAMINE (BENADRYL) capsule 25 mg  25 mg Oral Q4H PRN Muna Bui CRNA        Or    diphenhydrAMINE (BENADRYL) injection 25 mg  25 mg Intravenous Once PRN Muna Bui CRNA        Or    diphenhydrAMINE (BENADRYL) injection 25 mg  25 mg Intramuscular Q4H PRN Muna Bui CRNA        ketorolac (TORADOL) injection 30 mg  30 mg Intravenous Once Kurt Coera MD        [COMPLETED] lactated ringers bolus 1,000 mL  1,000 mL Intravenous Once Kurt Corea MD 4,000 mL/hr at 24 1135 1,000 mL at 24 1135    lactated ringers infusion  125 mL/hr Intravenous Continuous Kurt Corea MD   New Bag at 24 1338    lidocaine PF 1% (XYLOCAINE) injection 0.5 mL  0.5 mL  "Intradermal Once PRN Kurt Corea MD        methylergonovine (METHERGINE) injection 200 mcg  200 mcg Intramuscular Once PRN Kurt Corea MD        ondansetron (ZOFRAN) injection 4 mg  4 mg Intravenous Once Muna Bui CRNA        oxytocin (PITOCIN) 30 units in 0.9% sodium chloride 500 mL (premix)  650 mL/hr Intravenous Once Kurt Corea MD        Followed by    oxytocin (PITOCIN) 30 units in 0.9% sodium chloride 500 mL (premix)  85 mL/hr Intravenous Once Kurt Corea MD        [COMPLETED] Sod Citrate-Citric Acid (BICITRA) oral solution 30 mL  30 mL Oral Once Kurt Corea MD   30 mL at 05/21/24 1217    sodium chloride 0.9 % flush 10 mL  10 mL Intravenous Q12H Kurt Corea MD        sodium chloride 0.9 % flush 10 mL  10 mL Intravenous PRN Kurt Corea MD        sodium chloride 0.9 % infusion 40 mL  40 mL Intravenous PRN Kurt Corea MD         Orders (last 24 hrs)        Start     Ordered    05/21/24 1530  oxytocin (PITOCIN) 30 units in 0.9% sodium chloride 500 mL (premix)  Once        Placed in \"Followed by\" Linked Group    05/21/24 1427    05/21/24 1530  ondansetron (ZOFRAN) injection 4 mg  Once         05/21/24 1434    05/21/24 1500  Respirations  Every Hour      Comments: If respiratory rate is less than 10/min, notify the Anesthesiologist    05/21/24 1427    05/21/24 1500  oxytocin (PITOCIN) 30 units in 0.9% sodium chloride 500 mL (premix)  Once        Placed in \"Followed by\" Linked Group    05/21/24 1427    05/21/24 1500  ketorolac (TORADOL) injection 30 mg  Once         05/21/24 1427    05/21/24 1434  diphenhydrAMINE (BENADRYL) capsule 25 mg  Every 4 Hours PRN        Placed in \"Or\" Linked Group    05/21/24 1434    05/21/24 1434  diphenhydrAMINE (BENADRYL) injection 25 mg  Once As Needed        Placed in \"Or\" Linked Group    05/21/24 1434    05/21/24 1434  diphenhydrAMINE (BENADRYL) injection 25 mg  Every 4 Hours PRN        Placed " "in \"Or\" Linked Group    05/21/24 1434    05/21/24 1427  Vital Signs  Per Hospital Policy         05/21/24 1427    05/21/24 1427  If Respiratory Rate is Less Than 8/Min, See Narcan Order. Notify Anesthesiologist STAT.  Continuous         05/21/24 1427    05/21/24 1427  Blood Pressure and Pulse Every 4 Hours  Continuous         05/21/24 1427    05/21/24 1427  Activity & Removal of Farah Catheter, Per Obstetrician  Continuous         05/21/24 1427    05/21/24 1427  Notify Anesthesiologist for Any Questions / Problems  Continuous         05/21/24 1427    05/21/24 1427  Notify Anesthesia for Temp Over 101.4F  Continuous        Comments: May discharge from PACU with temperature at or above 95 degrees.    05/21/24 1427    05/21/24 1427  Ambu Bag at Bedside  Continuous         05/21/24 1427    05/21/24 1427  Notify Provider (Specified)  Until Discontinued         05/21/24 1427    05/21/24 1427  Vital Signs Per Hospital Policy  Per Hospital Policy         05/21/24 1427    05/21/24 1427  Strict Bed Rest  Until Discontinued         05/21/24 1427    05/21/24 1427  Fundal & Lochia Check  Every Shift       05/21/24 1427    05/21/24 1427  Diet: Regular/House; Fluid Consistency: Thin (IDDSI 0)  Diet Effective Now         05/21/24 1427    05/21/24 1427  methylergonovine (METHERGINE) injection 200 mcg  Once As Needed         05/21/24 1427    05/21/24 1427  carboprost (HEMABATE) injection 250 mcg  As Needed         05/21/24 1427    05/21/24 1200  Vital Signs q 4 while awake  Every 4 Hours      Comments: While the patient is awake.    05/21/24 1041    05/21/24 1130  sodium chloride 0.9 % flush 10 mL  Every 12 Hours Scheduled         05/21/24 1041    05/21/24 1130  lactated ringers bolus 1,000 mL  Once         05/21/24 1041    05/21/24 1130  lactated ringers infusion  Continuous         05/21/24 1041    05/21/24 1130  Sod Citrate-Citric Acid (BICITRA) oral solution 30 mL  Once         05/21/24 1041    05/21/24 1130  acetaminophen " "(TYLENOL) tablet 1,000 mg  Once         24 1041    24 1130  ceFAZolin 2000 mg IVPB in 100 mL NS (MBP)  Once         24 1041    24 1042  Admit To Obstetrics Inpatient  Once         24 1041    24 1042  Code Status and Medical Interventions:  Continuous         24 1041    24 1042  Vital Signs Per Hospital Policy  Per Hospital Policy         24 1041    24 1042  Continuous Fetal Monitoring With NST on Admission and Prior to Initiation of Oxytocin.  Per Order Details        Comments: Continuous Fetal Monitoring With NST on Admission    24 1041    24 1042  External Uterine Contraction Monitoring  Per Hospital Policy         24 1041    24 1042  Notify Provider (Specified)  Until Discontinued         24 1041    24 1042  Notify Provider of Tachysystole (Per Hospital Algorithm)  Until Discontinued         24 1041    24 1042  Notify Provider if Membranes Ruptured, Bleeding Greater Than 1 Pad Per Hour, Fetal Heart Tone Abnormality or Severe Pain  Until Discontinued         24 1041    24 1042  Initiate Group Beta Strep (GBS) Prophylaxis Protocol, If Criteria Met  Continuous        Comments: NO TREATMENT RECOMMENDED IF: 1) Maternal GBS Status Known Negative 2) Scheduled  Birth With Intact Membranes, Not in Labor 3) Maternal GBS Status Unknown, No Risk Factors  TREAT WITH ANTIBIOTICS IF:  1) Maternal GBS Status Known Positive 2) Maternal GBS Status Unknown With Risk Factors: a)  Previous Infant Affected By GBS Infection b) GBS Urinary Tract Infection (UTI) or Bacteriuria During Pregnancy c) Unexplained Maternal Fever (100.4F (38C) or Greater) During Labor d)  Prolonged Rupture of Membranes (18 or More Hours) e) Gestational Age Less Than 37 Weeks    24 1041    24 1042  Insert Indwelling Urinary Catheter  Once        Placed in \"And\" Linked Group    24 1041    24 1042  Assess Need for " "Indwelling Urinary Catheter - Follow Removal Protocol  Continuous        Comments: Indwelling Urinary Catheter Removal Criteria  Discontinue Indwelling Urinary Catheter Unless One of the Following is Present  Urinary Retention or Obstruction  Chronic Farah Catheter Use  End of Life  Critical Illness with Strict I/O   Tract or Abdominal Surgery  Stage 3/4 Sacral / Perineal Wound  Required Activity Restriction: Trauma  Required Activity Restriction: Spine Surgery  If Patient is Being Followed by Urology Contact Them PRIOR to Removal  Do Not Remove Indwelling Urinary Catheter Order is Present with a CLINICAL REASON to Maintain the Catheter. Provider is Required to Include a Clinical Reason to Maintain a Urinary Catheter    Chronic Farah Catheter Use (Present on Admission)  Assess for Continued Need & Document Medical Necessity  If Infection is Suspected, Contact the Provider       Placed in \"And\" Linked Group    05/21/24 1041    05/21/24 1042  Abdominal Prep With Clippers  Once         05/21/24 1041    05/21/24 1042  Chlorhexadine Skin Prep Unless Otherwise Indicated  Once         05/21/24 1041    05/21/24 1042  SCD (Sequential Compression Devices)  Once         05/21/24 1041    05/21/24 1042  POC Glucose Once  Once         05/21/24 1041    05/21/24 1042  Document Gatorade Consumption Prior to Admission (Yes or No)  Once         05/21/24 1041    05/21/24 1042  NPO Diet NPO Type: Ice Chips  Diet Effective Now,   Status:  Canceled         05/21/24 1041    05/21/24 1042  Inpatient Anesthesiology Consult  Once        Specialty:  Anesthesiology  Provider:  (Not yet assigned)    05/21/24 1041    05/21/24 1042  Insert Peripheral IV  Once         05/21/24 1041    05/21/24 1042  Saline Lock & Maintain IV Access  Continuous         05/21/24 1041    05/21/24 1041  sodium chloride 0.9 % flush 10 mL  As Needed         05/21/24 1041    05/21/24 1041  sodium chloride 0.9 % infusion 40 mL  As Needed         05/21/24 1041    05/21/24 " "1041  lidocaine PF 1% (XYLOCAINE) injection 0.5 mL  Once As Needed         05/21/24 1041    05/21/24 1041  Urinary Catheter Care  Every Shift      Placed in \"And\" Linked Group    05/21/24 1041    Unscheduled  IV Site Care  As Needed       05/21/24 1427    Unscheduled  Blood Gas, Arterial -With Co-Ox Panel: Yes  As Needed       05/21/24 1427    Unscheduled  Fundal & Lochia Check  As Needed      Comments: Every 15 Minutes x4, Then Every 30 Minutes x2, Then Every Shift    05/21/24 1427    --  acetaminophen (TYLENOL) 500 MG tablet  Every 6 Hours PRN         05/21/24 1109    Signed and Held  ondansetron (ZOFRAN) injection 4 mg  Once As Needed         Signed and Held    Signed and Held  naloxone (NARCAN) injection 0.4 mg  Every 1 Hour PRN         Signed and Held    Signed and Held  Transfer Patient  Once         Signed and Held    Signed and Held  Code Status and Medical Interventions:  Continuous         Signed and Held    Signed and Held  Vital Signs Per hospital policy  Per Hospital Policy         Signed and Held    Signed and Held  Notify Physician  Until Discontinued         Signed and Held    Signed and Held  Up with Assistance  As Needed         Signed and Held    Signed and Held  Ambulate Patient  2 Times Daily      Comments: After anesthesia wears off.    Signed and Held    Signed and Held  Patient May Shower  Once        Comments: After anesthesia wears off and with assistance    Signed and Held    Signed and Held  Diet: Regular/House; Fluid Consistency: Thin (IDDSI 0)  Diet Effective Now         Signed and Held    Signed and Held  Advance Diet As Tolerated -Regular  Until Discontinued         Signed and Held    Signed and Held  I/O  Every Shift       Signed and Held    Signed and Held  Fundal and Lochia Check  Per Hospital Policy        Comments: Q 30 min x 2, Q 1 hr x 4, Q 4 hrs x 24 hrs, then Q shift.    Signed and Held    Signed and Held  Continue Indwelling Urinary Catheter Already in Place  Once         " "Signed and Held    Signed and Held  Discontinue Indwelling Urinary Catheter in AM  Once         Signed and Held    Signed and Held  Bladder Scan if Patient Unable to Void 4-6 Hours After Catheter Removal  As Needed         Signed and Held    Signed and Held  Straight Cath Every 4-6 Hours As Needed If Patient is Unable to Void After 4-6 Hours, Bladder Scan Volume is Greater Than 500mL & Patient Has Symptoms of Bladder Discomfort / Distention  As Needed         Signed and Held    Signed and Held  Notify Provider if Bladder Distention Continues  Until Discontinued         Signed and Held    Signed and Held  Schedule / Prompt Voiding For Patients With Urinary Incontinence  As Needed         Signed and Held    Signed and Held  Urinary Catheter Care  Every Shift       Signed and Held    Signed and Held  Wound Care  As Needed        Comments: Postop day 1. Remove dressing and leave incision open to air.    Signed and Held    Signed and Held  Turn Cough Deep Breathe  Once         Signed and Held    Signed and Held  Incentive spirometry RT  Every Hour       Signed and Held    Signed and Held  Encourage early intake of PO fluids  Continuous         Signed and Held    Signed and Held  Ambulate Patient 3-5 times per day (with or without Farah)  Every Shift       Signed and Held    Signed and Held  Chewing Gum  As Needed         Signed and Held    Signed and Held  Apply Abdominal Binding Until Discontinued  Until Discontinued         Signed and Held    Signed and Held  \"If patient tolerates food and liquids after completion of second bag of Pitocin, saline lock IV and discontinue IV fluid infusions.  Once         Signed and Held    Signed and Held  Remove Abdominal Dressing  Once         Signed and Held    Signed and Held  Warm compress  As Needed         Signed and Held    Signed and Held  Breast pump to bed  Once         Signed and Held    Signed and Held  Apply ace wrap, tight bra, or binder  As Needed         Signed and Held " "   Signed and Held  Apply ice packs  As Needed         Signed and Held    Signed and Held  If indicated -- Please administer RH Immunoglobulin based on results of cord blood evaluation and fetal screen lab tests, pharmacy to dispense  Per Order Details        Comments: See Process Instructions For Reference Range Details.    Signed and Held    Signed and Held  CBC & Differential  Timed         Signed and Held    Signed and Held  oxytocin (PITOCIN) 30 units in 0.9% sodium chloride 500 mL (premix)  Once As Needed         Signed and Held    Signed and Held  acetaminophen (TYLENOL) tablet 1,000 mg  Every 6 Hours        Placed in \"Followed by\" Linked Group    Signed and Held    Signed and Held  acetaminophen (TYLENOL) tablet 650 mg  Every 6 Hours        Placed in \"Followed by\" Linked Group    Signed and Held    Signed and Held  ketorolac (TORADOL) injection 15 mg  Every 6 Hours        Placed in \"Followed by\" Linked Group    Signed and Held    Signed and Held  ibuprofen (ADVIL,MOTRIN) tablet 600 mg  Every 6 Hours        Placed in \"Followed by\" Linked Group    Signed and Held    Signed and Held  oxyCODONE (ROXICODONE) immediate release tablet 5 mg  Every 4 Hours PRN        Placed in \"Or\" Linked Group    Signed and Held    Signed and Held  oxyCODONE (ROXICODONE) immediate release tablet 10 mg  Every 4 Hours PRN        Placed in \"Or\" Linked Group    Signed and Held    Signed and Held  docusate sodium (COLACE) capsule 100 mg  2 Times Daily PRN         Signed and Held    Signed and Held  simethicone (MYLICON) chewable tablet 80 mg  4 Times Daily PRN         Signed and Held    Signed and Held  ondansetron (ZOFRAN) injection 4 mg  Every 6 Hours PRN         Signed and Held    Signed and Held  lanolin topical 1 Application  Every 1 Hour PRN         Signed and Held    Signed and Held  carboprost (HEMABATE) injection 250 mcg  As Needed         Signed and Held    Signed and Held  methylergonovine (METHERGINE) injection 200 mcg  Once " "As Needed         Signed and Held    Signed and Held  Hydrocortisone (Perianal) (ANUSOL-HC) 2.5 % rectal cream 1 Application  As Needed         Signed and Held    Signed and Held  prenatal vitamin tablet 1 tablet  Daily         Signed and Held    Signed and Held  aluminum-magnesium hydroxide-simethicone (MAALOX MAX) 400-400-40 MG/5ML suspension 15 mL  Every 4 Hours PRN        Placed in \"Or\" Linked Group    Signed and Held    Signed and Held  calcium carbonate (TUMS) chewable tablet 500 mg (200 mg elemental)  Every 4 Hours PRN        Placed in \"Or\" Linked Group    Signed and Held    Signed and Held  Place Sequential Compression Device  Once         Signed and Held    Signed and Held  Maintain Sequential Compression Device  Continuous         Signed and Held    Signed and Held  VTE Prophylaxis Not Indicated: Low Risk; No Risk Factors (0)  Once         Signed and Held    Signed and Held  diphenhydrAMINE (BENADRYL) capsule 25 mg  Every 4 Hours PRN         Signed and Held    Signed and Held  promethazine (PHENERGAN) tablet 12.5 mg  Every 4 Hours PRN         Signed and Held                     Operative/Procedure Notes (last 24 hours)        Kurt Corea MD at 24 1300           Section Procedure Note    Indications: H/o previous  section low transverse; breech presentation.       Pre-operative Diagnosis: 1: 39w3d.  2: History of prior  section.:  3: Persistent breech presentation.                Previous  section complicating pregnancy    Pregnancy    LGSIL on Pap smear of cervix    Iron deficiency anemia during pregnancy    Breech presentation                Post-operative Diagnosis: 1:  Same.  2: Delivery viable male infant.  3: Pelvic inadequacy in pregnancy..    Procedures:  Procedure(s):   SECTION REPEAT low-transverse.    Surgeons:  Surgeons and Role:     * Kurt Corea MD - Primary    Anesthesia:  Spinal    Estimated Blood Loss:   500 mL.    Infant:      " "      Gender: male  infant    Weight: 3970 g (8 lb 12 oz)     Apgars:   @ 1 minute /       @ 5 minutes    Cord gases: Venous:  No results found for: \"PHCVEN\", \"BECVEN\"   Arterial:  No results found for: \"PHCART\", \"BECART\"               Findings:       The infant was delivered from the Presentation/Position: Breech ;      footling breech presentation.The amnionic fluid was Clear .  Uterus tubes and ovaries appeared normal.  Pelvic inlet was noted to be narrow.    Drains:  Farah catheter to straight drainage.                 Specimens: None    Antibiotics:  cefazolin (Ancef)           Complications:  None; patient tolerated the procedure well.           Disposition: PACU - hemodynamically stable.           Condition: stable        Procedure Details   The patient was seen in the Holding Room. The risks, benefits, complications, treatment options, and expected outcomes were discussed with the patient.  The patient concurred with the proposed plan, giving informed consent.  The patient was taken to the Operating Room, identified as Kristel Fernandez and the procedure verified as  Delivery. A Time Out was held and the above information confirmed.  There was some difficulty in getting the spinal block placed.      After an adequate level of anesthesia was obtained, the patient was draped and prepped in the usual sterile manner. A Pfannenstiel incision was made with the scalpel.  The incision was carried down through the subcutaneous tissue to the fascia.  Subutaneous scar tissue was lysed with the Hall scissors and electrocautery.  Fascial incision was made transversely with the scalpel and and extended transversely in a curvilinear fashion with the Hall scissors. The fascia was  bluntly and sharply from the underlying rectus tissue superiorly and inferiorly. The rectus muscles were divided sharply along the midline. The peritoneum was identified and entered. Peritoneal incision was extended " longitudinally taking care not to injure the bladder and bowel.  The bladder flap was not needed.  A low transverse uterine incision was made with the scalpel.  The uterine cavity was entered sharply and the uterine incision was extended bluntly. The infant was delivered from Breech  presentation without difficulty.  The nose and oropharynx were bulb suctioned.  After milking the cord 4 times it was clamped and cut, and cord blood was obtained for evaluation. The placenta was expressed intact and appeared normal. The uterus was exteriorized and wrapped in a moist laparotomy sponge. The endometrial cavity was explored and wiped clean with a moist laparotomy sponge.  The uterine outline, tubes and ovaries appeared normal. The uterine incision was closed in a single layer using a running continuous locking suture of 0 Monocryl.   Hemostasis was noted to be good.   The uterus was returned to the peritoneal cavity.  Irrigation was performed and hemostasis obtained with electrocautery.    Hemostasis was confirmed. All sponge and instrument counts were correct. The peritoneum was closed with a running continuous suture of 2-0 Vicryl;  The rectus muscles reapproximated with interrupted sutures of 0 Vicryl. The fascia was then reapproximated with running continuous sutures of 0 Vicryl. The subcutaneous layer was irrigated, noted to be hemostatic, and closed with 3-0 plain Gut.  The skin was reapproximated with INSORB subcuticular staples and  Skin Glue was placed.    Instrument, sponge, and needle counts were correct prior the abdominal closure and at the conclusion of the case.  Minimal clots were expressed from the vaginal vault.  The patient went to the Recovery Room in stable condition with the calvert draining clear urine.     Kurt Corea MD      5/21/2024  13:52 EDT                  Electronically signed by Kurt Corea MD at 05/21/24 7264       Physician Progress Notes (last 24 hours)  Notes from  05/20/24 1443 through 05/21/24 1443   No notes of this type exist for this encounter.

## 2024-05-21 NOTE — ANESTHESIA POSTPROCEDURE EVALUATION
Patient: Kristel Fernandez    Procedure Summary       Date: 24 Room / Location: Counts include 234 beds at the [facility] LABOR DELIVERY   [facility] LABOR DELIVERY    Anesthesia Start: 1219 Anesthesia Stop: 1350    Procedure:  SECTION REPEAT (Abdomen) Diagnosis:     Surgeons: Kurt Corea MD Provider: Harman Garcia MD    Anesthesia Type: ITN, spinal ASA Status: 2            Anesthesia Type: ITN, spinal    Vitals  Vitals Value Taken Time   BP 97/48 24 1346   Temp 97.6    Pulse 105 24 1349   Resp 16    SpO2 94 % 24 1349   Vitals shown include unfiled device data.        Post Anesthesia Care and Evaluation    Patient location during evaluation: bedside  Patient participation: complete - patient participated  Level of consciousness: awake and alert  Pain management: adequate    Airway patency: patent  Anesthetic complications: No anesthetic complications    Cardiovascular status: acceptable  Respiratory status: acceptable  Hydration status: acceptable

## 2024-05-21 NOTE — OP NOTE
" Section Procedure Note    Indications: H/o previous  section low transverse; breech presentation.       Pre-operative Diagnosis: 1: 39w3d.  2: History of prior  section.:  3: Persistent breech presentation.                Previous  section complicating pregnancy    Pregnancy    LGSIL on Pap smear of cervix    Iron deficiency anemia during pregnancy    Breech presentation                Post-operative Diagnosis: 1:  Same.  2: Delivery viable male infant.  3: Pelvic inadequacy in pregnancy..    Procedures:  Procedure(s):   SECTION REPEAT low-transverse.    Surgeons:  Surgeons and Role:     * Kurt Corea MD - Primary    Anesthesia:  Spinal    Estimated Blood Loss:   500 mL.    Infant:            Gender: male  infant    Weight: 3970 g (8 lb 12 oz)     Apgars:   @ 1 minute /       @ 5 minutes    Cord gases: Venous:  No results found for: \"PHCVEN\", \"BECVEN\"   Arterial:  No results found for: \"PHCART\", \"BECART\"               Findings:       The infant was delivered from the Presentation/Position: Breech ;      footling breech presentation.The amnionic fluid was Clear .  Uterus tubes and ovaries appeared normal.  Pelvic inlet was noted to be narrow.    Drains:  Farah catheter to straight drainage.                 Specimens: None    Antibiotics:  cefazolin (Ancef)           Complications:  None; patient tolerated the procedure well.           Disposition: PACU - hemodynamically stable.           Condition: stable        Procedure Details   The patient was seen in the Holding Room. The risks, benefits, complications, treatment options, and expected outcomes were discussed with the patient.  The patient concurred with the proposed plan, giving informed consent.  The patient was taken to the Operating Room, identified as Kristel Fernandez and the procedure verified as  Delivery. A Time Out was held and the above information confirmed.  There was some difficulty in " getting the spinal block placed.      After an adequate level of anesthesia was obtained, the patient was draped and prepped in the usual sterile manner. A Pfannenstiel incision was made with the scalpel.  The incision was carried down through the subcutaneous tissue to the fascia.  Subutaneous scar tissue was lysed with the Hall scissors and electrocautery.  Fascial incision was made transversely with the scalpel and and extended transversely in a curvilinear fashion with the Hall scissors. The fascia was  bluntly and sharply from the underlying rectus tissue superiorly and inferiorly. The rectus muscles were divided sharply along the midline. The peritoneum was identified and entered. Peritoneal incision was extended longitudinally taking care not to injure the bladder and bowel.  The bladder flap was not needed.  A low transverse uterine incision was made with the scalpel.  The uterine cavity was entered sharply and the uterine incision was extended bluntly. The infant was delivered from Breech  presentation without difficulty.  The nose and oropharynx were bulb suctioned.  After milking the cord 4 times it was clamped and cut, and cord blood was obtained for evaluation. The placenta was expressed intact and appeared normal. The uterus was exteriorized and wrapped in a moist laparotomy sponge. The endometrial cavity was explored and wiped clean with a moist laparotomy sponge.  The uterine outline, tubes and ovaries appeared normal. The uterine incision was closed in a single layer using a running continuous locking suture of 0 Monocryl.   Hemostasis was noted to be good.   The uterus was returned to the peritoneal cavity.  Irrigation was performed and hemostasis obtained with electrocautery.    Hemostasis was confirmed. All sponge and instrument counts were correct. The peritoneum was closed with a running continuous suture of 2-0 Vicryl;  The rectus muscles reapproximated with interrupted sutures of 0  Vicryl. The fascia was then reapproximated with running continuous sutures of 0 Vicryl. The subcutaneous layer was irrigated, noted to be hemostatic, and closed with 3-0 plain Gut.  The skin was reapproximated with INSORB subcuticular staples and  Skin Glue was placed.    Instrument, sponge, and needle counts were correct prior the abdominal closure and at the conclusion of the case.  Minimal clots were expressed from the vaginal vault.  The patient went to the Recovery Room in stable condition with the calvert draining clear urine.     Kurt Corea MD      5/21/2024  13:52 EDT                 Diarrhea

## 2024-05-22 LAB
BASOPHILS # BLD AUTO: 0.01 10*3/MM3 (ref 0–0.2)
BASOPHILS NFR BLD AUTO: 0.1 % (ref 0–1.5)
DEPRECATED RDW RBC AUTO: 41.5 FL (ref 37–54)
EOSINOPHIL # BLD AUTO: 0.21 10*3/MM3 (ref 0–0.4)
EOSINOPHIL NFR BLD AUTO: 2.9 % (ref 0.3–6.2)
ERYTHROCYTE [DISTWIDTH] IN BLOOD BY AUTOMATED COUNT: 13.6 % (ref 12.3–15.4)
HCT VFR BLD AUTO: 28.5 % (ref 34–46.6)
HGB BLD-MCNC: 9.4 G/DL (ref 12–15.9)
IMM GRANULOCYTES # BLD AUTO: 0.06 10*3/MM3 (ref 0–0.05)
IMM GRANULOCYTES NFR BLD AUTO: 0.8 % (ref 0–0.5)
LYMPHOCYTES # BLD AUTO: 1.15 10*3/MM3 (ref 0.7–3.1)
LYMPHOCYTES NFR BLD AUTO: 16.1 % (ref 19.6–45.3)
MCH RBC QN AUTO: 28 PG (ref 26.6–33)
MCHC RBC AUTO-ENTMCNC: 33 G/DL (ref 31.5–35.7)
MCV RBC AUTO: 84.8 FL (ref 79–97)
MONOCYTES # BLD AUTO: 0.6 10*3/MM3 (ref 0.1–0.9)
MONOCYTES NFR BLD AUTO: 8.4 % (ref 5–12)
NEUTROPHILS NFR BLD AUTO: 5.11 10*3/MM3 (ref 1.7–7)
NEUTROPHILS NFR BLD AUTO: 71.7 % (ref 42.7–76)
NRBC BLD AUTO-RTO: 0 /100 WBC (ref 0–0.2)
PLATELET # BLD AUTO: 171 10*3/MM3 (ref 140–450)
PMV BLD AUTO: 10.3 FL (ref 6–12)
RBC # BLD AUTO: 3.36 10*6/MM3 (ref 3.77–5.28)
WBC NRBC COR # BLD AUTO: 7.14 10*3/MM3 (ref 3.4–10.8)

## 2024-05-22 PROCEDURE — 85025 COMPLETE CBC W/AUTO DIFF WBC: CPT | Performed by: OBSTETRICS & GYNECOLOGY

## 2024-05-22 PROCEDURE — 25010000002 KETOROLAC TROMETHAMINE PER 15 MG: Performed by: OBSTETRICS & GYNECOLOGY

## 2024-05-22 PROCEDURE — 0503F POSTPARTUM CARE VISIT: CPT | Performed by: NURSE PRACTITIONER

## 2024-05-22 RX ADMIN — OXYCODONE 5 MG: 5 TABLET ORAL at 17:20

## 2024-05-22 RX ADMIN — PRENATAL VITAMINS-IRON FUMARATE 27 MG IRON-FOLIC ACID 0.8 MG TABLET 1 TABLET: at 08:21

## 2024-05-22 RX ADMIN — KETOROLAC TROMETHAMINE 15 MG: 15 INJECTION, SOLUTION INTRAMUSCULAR; INTRAVENOUS at 15:15

## 2024-05-22 RX ADMIN — KETOROLAC TROMETHAMINE 15 MG: 15 INJECTION, SOLUTION INTRAMUSCULAR; INTRAVENOUS at 04:00

## 2024-05-22 RX ADMIN — ACETAMINOPHEN 1000 MG: 500 TABLET ORAL at 00:02

## 2024-05-22 RX ADMIN — ACETAMINOPHEN 1000 MG: 500 TABLET ORAL at 11:49

## 2024-05-22 RX ADMIN — IBUPROFEN 600 MG: 600 TABLET, FILM COATED ORAL at 20:15

## 2024-05-22 RX ADMIN — SIMETHICONE 80 MG: 80 TABLET, CHEWABLE ORAL at 08:21

## 2024-05-22 RX ADMIN — DOCUSATE SODIUM 100 MG: 100 CAPSULE, LIQUID FILLED ORAL at 22:31

## 2024-05-22 RX ADMIN — ACETAMINOPHEN 1000 MG: 500 TABLET ORAL at 06:41

## 2024-05-22 RX ADMIN — ACETAMINOPHEN 650 MG: 325 TABLET ORAL at 17:20

## 2024-05-22 RX ADMIN — SIMETHICONE 80 MG: 80 TABLET, CHEWABLE ORAL at 22:32

## 2024-05-22 RX ADMIN — ACETAMINOPHEN 650 MG: 325 TABLET ORAL at 23:41

## 2024-05-22 RX ADMIN — KETOROLAC TROMETHAMINE 15 MG: 15 INJECTION, SOLUTION INTRAMUSCULAR; INTRAVENOUS at 08:21

## 2024-05-22 RX ADMIN — DOCUSATE SODIUM 100 MG: 100 CAPSULE, LIQUID FILLED ORAL at 08:21

## 2024-05-22 NOTE — PROGRESS NOTES
Postpartum Progress Note    Patient name: Kristel Fernandez  YOB: 2000   MRN: [id number]  Referring Provider: Kurt Corea MD  Admission Date: 2024  Date of Service: 2024    ID: 24 y.o.     Diagnosis:   S/p  delivery 1 Day Post-Op     Delivery by  section using transverse incision of lower segment of uterus    Previous  section complicating pregnancy    Pregnancy    LGSIL on Pap smear of cervix    Iron deficiency anemia during pregnancy    Breech presentation       Subjective:      No complaints.  Moderate lochia.  Ambulating, voiding, tolerating diet.  Pain well controlled.  The patient is currently breastfeeding.   This baby is a male    Objective:      Vital signs:  Vital Signs Range for the last 24 hours  Temperature: Temp:  [97.6 °F (36.4 °C)-98.6 °F (37 °C)] 97.8 °F (36.6 °C)   Temp Source: Temp src: Oral   BP: BP: ()/(48-81) 120/77   Pulse: Heart Rate:  [] 84   Respirations: Resp:  [16-18] 18   Weight: 68.5 kg (151 lb)     General: Alert & oriented x4, in no apparent distress  Abdomen: soft, nontender  Uterus: firm, nontender  Incision: clean, dry, intact  Extremities: nontender; no edema      Labs:  Lab Results   Component Value Date    WBC 7.14 2024    HGB 9.4 (L) 2024    HCT 28.5 (L) 2024    MCV 84.8 2024     2024     Results from last 7 days   Lab Units 24  0832   ABO TYPING  O   RH TYPING  Positive     External Prenatal Results       Pregnancy Outside Results - Transcribed From Office Records - See Scanned Records For Details       Test Value Date Time    ABO  O  24 0832    Rh  Positive  24 0832    Antibody Screen  Negative  24 0832       Negative  24 1218       Negative  23 1502    Varicella IgG       Rubella  3.96 index 23 1502    Hgb  9.4 g/dL 24 0441       11.2 g/dL 24 0832       11.6 g/dL 24 1218       13.5 g/dL 23  1351       13.2 g/dL 23 1502    Hct  28.5 % 24 0441       34.3 % 24 0832       33.9 % 24 1218       38.6 % 23 1351       38.7 % 23 1502    Glucose Fasting GTT       Glucose Tolerance Test 1 hour       Glucose Tolerance Test 3 hour       Gonorrhea (discrete)  Negative  01/10/24        Negative  23        Negative  23 1502    Chlamydia (discrete)  Negative  01/10/24        Negative  23        Negative  23 1502    RPR  Non Reactive  24 1218       Non Reactive  23 1502    VDRL       Syphilis Antibody       HBsAg  Negative  23 1502    Herpes Simplex Virus PCR       Herpes Simplex VIrus Culture       HIV  Non Reactive  23 1502    Hep C RNA Quant PCR       Hep C Antibody  Non Reactive  23 1502    AFP  47.7 ng/mL 23 1511    Group B Strep  No Group B Streptococcus isolated  24 1815    GBS Susceptibility to Clindamycin       GBS Susceptibility to Erythromycin       Fetal Fibronectin       Genetic Testing, Maternal Blood                 Drug Screening       Test Value Date Time    NIPT        Urine Drug Screen       Amphetamine Screen  Negative ng/mL 23 1502    Barbiturate Screen  Negative ng/mL 23 1502    Benzodiazepine Screen  Negative ng/mL 23 1502    Methadone Screen  Negative ng/mL 23 1502    Phencyclidine Screen  Negative ng/mL 23 1502    Opiates Screen       THC Screen       Cocaine Screen       Propoxyphene Screen  Negative ng/mL 23 1502    Buprenorphine Screen       Methamphetamine Screen       Oxycodone Screen                 Legend    ^: Historical                            Assessment/Plan:      1 Day Post-Op s/p Procedure(s):   SECTION REPEAT  1. S/p  delivery: Continue postoperative care.  Doing well.  2. Infant feeding: Supportive care.  The patient is currently breastfeeding.  Male infant desires circumcision.

## 2024-05-22 NOTE — LACTATION NOTE
24   Maternal Information   Date of Referral 24   Person Making Referral lactation consultant  (courtesy visit, newly postpartum)   Maternal Reason for Referral breastfeeding currently   Infant Reason for Referral  infant   Milk Expression/Equipment   Breast Pump Type double electric, personal  (Medela PIS)   Breast Pumping   Breast Pumping Interventions early pumping promoted;frequent pumping encouraged     Courtesy visit to newly postpartum pt. She states she plans to exclusively pump. She got a Medela Pump in Style through her ins. She has it with her. I took her a wash basin, Delia soap, and sterilization bag. Discussed how to clean/sterilize pump parts in between pumping. Encouraged to pump Q3H around the clock to build optimal milk supply. Pt. Verbalized understanding. I reviewed and gave her information handouts on cleaning pump parts, pumping log, hand self expression. She verbalized understanding. Encouraged to call lactation with any questions/concerns. Encouraged to call outpatient clinic prn after discharge.

## 2024-05-22 NOTE — CASE MANAGEMENT/SOCIAL WORK
"Continued Stay Note  [facility]     Patient Name: Kristel Fernandez  MRN: [id number]  Today's Date: 5/22/2024    Admit Date: 5/21/2024    Plan: YUE consult--home   Discharge Plan       Row Name 05/22/24 1328       Plan    Plan SW consult--home    Plan Comments MSW met with pt. at bedside. Pt. had an [place] score of 14. Pt. reports that she is feeling \"tired\".  Pt. reports she is taking Zoloft. MSW provided resources and offered support. Pt. reports that she is aware of how she feels and will ask for help if she needs it.  No other needs or concerns at this time. MSW is available.    Final Discharge Disposition Code 01 - home or self-care                   Discharge Codes    No documentation.                       TORO Baker    "

## 2024-05-22 NOTE — ANESTHESIA POSTPROCEDURE EVALUATION
Patient: Kristel Fernandez    Procedure Summary       Date: 24 Room / Location: [facility] LABOR DELIVERY   [facility] LABOR DELIVERY    Anesthesia Start: 1219 Anesthesia Stop: 1350    Procedure:  SECTION REPEAT (Abdomen) Diagnosis:     Surgeons: Kurt Corea MD Provider: Harman Garcia MD    Anesthesia Type: ITN, spinal ASA Status: 2            Anesthesia Type: ITN, spinal    Vitals  Vitals Value Taken Time   /83 24 1100   Temp 97.7 °F (36.5 °C) 24 1100   Pulse 89 24 1100   Resp 18 24 1100   SpO2 98 % 24 1548   Vitals shown include unfiled device data.        Post Anesthesia Care and Evaluation    Patient location during evaluation: bedside  Patient participation: complete - patient participated  Level of consciousness: awake and alert  Pain management: adequate    Airway patency: patent  Anesthetic complications: No anesthetic complications    Cardiovascular status: acceptable  Respiratory status: acceptable  Hydration status: acceptable  Post Neuraxial Block status: Motor and sensory function returned to baseline and No signs or symptoms of PDPH

## 2024-05-23 VITALS
SYSTOLIC BLOOD PRESSURE: 120 MMHG | HEART RATE: 95 BPM | RESPIRATION RATE: 18 BRPM | DIASTOLIC BLOOD PRESSURE: 75 MMHG | WEIGHT: 151 LBS | TEMPERATURE: 98 F | OXYGEN SATURATION: 100 % | HEIGHT: 59 IN | BODY MASS INDEX: 30.44 KG/M2

## 2024-05-23 PROBLEM — A59.01 TRICHOMONAL VAGINITIS: Status: RESOLVED | Noted: 2021-12-09 | Resolved: 2024-05-23

## 2024-05-23 PROCEDURE — 0503F POSTPARTUM CARE VISIT: CPT

## 2024-05-23 RX ORDER — PSEUDOEPHEDRINE HCL 30 MG
100 TABLET ORAL 2 TIMES DAILY PRN
Qty: 60 CAPSULE | Refills: 0 | Status: SHIPPED | OUTPATIENT
Start: 2024-05-23

## 2024-05-23 RX ORDER — IBUPROFEN 600 MG/1
600 TABLET ORAL EVERY 6 HOURS PRN
Qty: 60 TABLET | Refills: 0 | Status: SHIPPED | OUTPATIENT
Start: 2024-05-23

## 2024-05-23 RX ADMIN — ACETAMINOPHEN 650 MG: 325 TABLET ORAL at 05:38

## 2024-05-23 RX ADMIN — IBUPROFEN 600 MG: 600 TABLET, FILM COATED ORAL at 02:41

## 2024-05-23 RX ADMIN — ACETAMINOPHEN 650 MG: 325 TABLET ORAL at 12:07

## 2024-05-23 RX ADMIN — IBUPROFEN 600 MG: 600 TABLET, FILM COATED ORAL at 09:20

## 2024-05-23 RX ADMIN — PRENATAL VITAMINS-IRON FUMARATE 27 MG IRON-FOLIC ACID 0.8 MG TABLET 1 TABLET: at 09:20

## 2024-05-23 NOTE — PLAN OF CARE
Problem: Adult Inpatient Plan of Care  Goal: Plan of Care Review  Outcome: Met  Goal: Patient-Specific Goal (Individualized)  Outcome: Met  Goal: Absence of Hospital-Acquired Illness or Injury  Outcome: Met  Intervention: Identify and Manage Fall Risk  Recent Flowsheet Documentation  Taken 2024 1012 by Penny Mac RN  Safety Promotion/Fall Prevention: safety round/check completed  Taken 2024 0753 by Penny Mac RN  Safety Promotion/Fall Prevention: safety round/check completed  Taken 2024 0705 by Penny Mac RN  Safety Promotion/Fall Prevention: safety round/check completed  Intervention: Prevent and Manage VTE (Venous Thromboembolism) Risk  Recent Flowsheet Documentation  Taken 2024 0753 by Penny Mac RN  Activity Management: up ad aman  Goal: Optimal Comfort and Wellbeing  Outcome: Met  Goal: Readiness for Transition of Care  Outcome: Met     Problem: Adjustment to Role Transition (Postpartum  Delivery)  Goal: Successful Maternal Role Transition  Outcome: Met     Problem: Bleeding (Postpartum  Delivery)  Goal: Hemostasis  Outcome: Met     Problem: Infection (Postpartum  Delivery)  Goal: Absence of Infection Signs and Symptoms  Outcome: Met     Problem: Pain (Postpartum  Delivery)  Goal: Acceptable Pain Control  Outcome: Met     Problem: Postoperative Nausea and Vomiting (Postpartum  Delivery)  Goal: Nausea and Vomiting Relief  Outcome: Met     Problem: Postoperative Urinary Retention (Postpartum  Delivery)  Goal: Effective Urinary Elimination  Outcome: Met   Goal Outcome Evaluation:

## 2024-05-23 NOTE — DISCHARGE SUMMARY
Discharge Summary    Date of Admission: 2024  Date of Discharge:  2024      Patient: Kristel Fernandez      MR#:[id number]    Delivery Provider: Kurt Corea     Attending Provider: Kurt Corea MD    Presenting Problem/History of Present Illness  Pregnancy [Z34.90]  Pregnancy [Z34.90]       Delivery by  section using transverse incision of lower segment of uterus    Previous  section complicating pregnancy    Pregnancy    LGSIL on Pap smear of cervix    Iron deficiency anemia during pregnancy    Breech presentation         Discharge Diagnosis: Vaginal delivery at 39w3d    Procedures:  , Low Transverse     2024    1:08 PM        Discharge Date: 2024;     Hospital Course  Patient is a 24 y.o. female  at 39w3d status post vaginal delivery without complication. Postpartum the patient did well. She remained afebrile, with vital signs stable. She was ready for discharge on postpartum day 2.     Infant:   male  fetus 3970 g (8 lb 12 oz)  with Apgar scores of 7 , 9  at five minutes.    Condition on Discharge:  Stable    Vital Signs  Temp:  [97.6 °F (36.4 °C)-98.3 °F (36.8 °C)] 98 °F (36.7 °C)  Heart Rate:  [] 95  Resp:  [18] 18  BP: (119-131)/(70-87) 120/75    Lab Results   Component Value Date    WBC 7.14 2024    HGB 9.4 (L) 2024    HCT 28.5 (L) 2024    MCV 84.8 2024     2024       Discharge Disposition  Home or Self Care    Discharge Medications     Discharge Medications        New Medications        Instructions Start Date   docusate sodium 100 MG capsule   100 mg, Oral, 2 Times Daily PRN      ibuprofen 600 MG tablet  Commonly known as: ADVIL,MOTRIN   600 mg, Oral, Every 6 Hours PRN             Continue These Medications        Instructions Start Date   acetaminophen 500 MG tablet  Commonly known as: TYLENOL   1,000 mg, Oral, Every 6 Hours PRN      ferrous sulfate 325 (65 FE) MG EC tablet   325 mg, Oral,  Daily With Breakfast      prenatal vitamins 29-1 MG tablet tablet   1 tablet, Oral, Daily               Discharge Diet:   Diet Instructions       Diet: Regular/House Diet; Regular (IDDSI 7); Thin (IDDSI 0)      Discharge Diet: Regular/House Diet    Texture: Regular (IDDSI 7)    Fluid Consistency: Thin (IDDSI 0)            Activity at Discharge:   Activity Instructions       Driving Restrictions      Type of Restriction:  Driving  Lifting  Bathing  Sex       Driving Restrictions: No Driving (Time Limited)    Length: 2 Weeks    Explain Sexual Activity Restrictions: 6 weeks    Bathing Restrictions: No Tub Bath    Lifting Restrictions: Lifting Restriction (Indicate Limit)    Weight Limit (Pounds): 20    Length of Lifting Restriction: 2 weeks    Pelvic Rest              Follow-up Appointments  No future appointments.  Additional Instructions for the Follow-ups that You Need to Schedule       Call MD With Problems / Concerns   As directed      Instructions: Discussed changes in postpartum mood-anxiety, depression, sadness to call office for evaluation as soon as symptoms arise even before follow up appointment. Monitor for excessive bleeding >1 pad/hr for several hours, dizziness, syncope, fever or changes in blood pressure.     Reviewed Pre-eclampsia signs/symptoms-Headache not relieved by tylenol or rest, chest pain, shortness of breath, right upper quadrant pain, blurry vision. Monitor blood pressure at home call provider if above 140/90 consistently.    Order Comments: Instructions: Discussed changes in postpartum mood-anxiety, depression, sadness to call office for evaluation as soon as symptoms arise even before follow up appointment. Monitor for excessive bleeding >1 pad/hr for several hours, dizziness, syncope, fever or changes in blood pressure.  Reviewed Pre-eclampsia signs/symptoms-Headache not relieved by tylenol or rest, chest pain, shortness of breath, right upper quadrant pain, blurry vision. Monitor blood  pressure at home call provider if above 140/90 consistently.         Discharge Follow-up with Specified Provider:  or SANGEETHA; 2 Weeks   As directed      To:  or SANGEETHA   Follow Up: 2 Weeks               to perform circ on infant prior to discharge.    Kurt Corea MD  05/23/24  09:45 EDT  Csd

## 2024-06-05 ENCOUNTER — POSTPARTUM VISIT (OUTPATIENT)
Dept: OBSTETRICS AND GYNECOLOGY | Facility: CLINIC | Age: 24
End: 2024-06-05
Payer: COMMERCIAL

## 2024-06-05 VITALS
DIASTOLIC BLOOD PRESSURE: 92 MMHG | HEIGHT: 59 IN | WEIGHT: 131 LBS | SYSTOLIC BLOOD PRESSURE: 118 MMHG | BODY MASS INDEX: 26.41 KG/M2

## 2024-06-05 DIAGNOSIS — Z12.4 SCREENING FOR CERVICAL CANCER: ICD-10-CM

## 2024-06-05 DIAGNOSIS — O99.019 IRON DEFICIENCY ANEMIA DURING PREGNANCY: ICD-10-CM

## 2024-06-05 DIAGNOSIS — O34.219 PREVIOUS CESAREAN SECTION COMPLICATING PREGNANCY: ICD-10-CM

## 2024-06-05 DIAGNOSIS — D50.9 IRON DEFICIENCY ANEMIA DURING PREGNANCY: ICD-10-CM

## 2024-06-05 DIAGNOSIS — R87.612 LGSIL ON PAP SMEAR OF CERVIX: ICD-10-CM

## 2024-06-05 RX ORDER — NORELGESTROMIN AND ETHINYL ESTRADIOL 35; 150 UG/MG; UG/MG
1 PATCH TRANSDERMAL WEEKLY
Qty: 4 PATCH | Refills: 11 | Status: SHIPPED | OUTPATIENT
Start: 2024-06-05 | End: 2025-06-05

## 2024-06-05 NOTE — PROGRESS NOTES
"      Chief Complaint   Patient presents with    Postpartum Care       Postpartum Visit         rKistel Fernandez is a 24 y.o.  who presents today for a 15 day(s) postpartum check.      C/S: breech     , Low Transverse    Information for the patient's :  Vasquez Mccall [[id number]]   2024   male   Vasquez Mccall   3970 g (8 lb 12 oz)   Gestational Age: 39w3d      Baby Discharged with Mom  Delivering MD: Kurt Corea MD.    Pregnancy complications: no known issues    Patient reports her incision is clean, dry, intact. Patient describes vaginal bleeding as light. She is bottle feeding. She would like to discuss the following complaints today: unsure if c/s wound is healing properly.    She desires to discuss  patch  for contraception.    Patient reports concerns for postpartum depression/anxiety. Patient denies  suicidal or homicidal ideation. Her postpartum depression screening questionnaire: 18.  Describes having had PPD with first pregnancy and medication zoloft made her feel \"not happy, I was just there\".  Has tried therapy as well but feels it is hard to talk to therapist.   Has \"best friend\" that she feels that she can talk to.      The additional following portions of the patient's history were reviewed and updated as appropriate: allergies, current medications, past family history, past medical history, past social history, past surgical history, and problem list.      Review of Systems   Constitutional:  Negative for chills and fever.   Respiratory: Negative.     Cardiovascular: Negative.    Gastrointestinal:  Negative for abdominal distention and abdominal pain.   Skin:  Positive for wound (c/s wound, itching).   Psychiatric/Behavioral:  Negative for dysphoric mood and depressed mood.         Post-partum depression   All other systems reviewed and are negative.      I have reviewed and agree with the HPI, ROS, and historical information as entered above.   Kurt Zeng " "MD [place]      Objective   /92   Ht 149.9 cm (59\")   Wt 59.4 kg (131 lb)   LMP 2023   Breastfeeding No   BMI 26.46 kg/m²     Physical Exam  Vitals and nursing note reviewed.   Constitutional:       Appearance: Normal appearance. She is well-developed.   HENT:      Head: Normocephalic and atraumatic.   Pulmonary:      Effort: Pulmonary effort is normal.   Abdominal:      General: A surgical scar is present.      Palpations: Abdomen is soft. Abdomen is not rigid.      Comments: Clean, Dry, and Intact.  No erythema.    Musculoskeletal:      Cervical back: Normal range of motion.   Neurological:      Mental Status: She is alert and oriented to person, place, and time.   Psychiatric:         Mood and Affect: Mood normal.         Behavior: Behavior normal.              Assessment and Plan    Problem List Items Addressed This Visit          Gynecologic and Obstetric Problems    Delivery by  section using transverse incision of lower segment of uterus - Primary    Iron deficiency anemia during pregnancy    Overview     Iron deficiency anemia.  Hematocrit 33% at 28 weeks.  Rx iron sulfate 325 mg every other day.         Relevant Medications    ferrous sulfate 325 (65 FE) MG EC tablet       Other    LGSIL on Pap smear of cervix    Overview     Pap smear 2023 with LGSIL.  Positive for trichomonas.and BV  Pregnant.  Colpo 1/10/2024 with biopsy at 12.  No ECC.  Biopsy with LGSIL.  Needs repeat Pap smear done 6 weeks postpartum.         Previous  section complicating pregnancy    Overview     2022; primary  for breech presentation.  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  7 pounds 4 ounce.    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     Repeat   at 12 noon.             Screening for cervical cancer    Overview     2023 Pap with HPV done.  Pap smear 2023 with LGSIL.  Positive for trichomonas.and BV     Colpo 1/10/2024 with biopsy at 12.  No ECC.  " Biopsy with LGSIL.  Repeat Pap smear 6 weeks postpartum.            S/p , 2 week(s) postpartum.  Doing well.    Continued pelvic rest with a return to driving and light physical activity.  Baby doing well.  Bottlefeeding going well.  No si/sx of postpartum depression  Contraception: contraceptive methods: Ortho-Evra patches weekly  LGSIL on colposcopy 1/10/2024.  Needs repeat Pap smear in 4 weeks.   Return in about 4 weeks (around 7/3/2024) for Follow-up Pap smear.    Kurt Corea MD  2024

## 2024-06-06 ENCOUNTER — MATERNAL SCREENING (OUTPATIENT)
Dept: CALL CENTER | Facility: HOSPITAL | Age: 24
End: 2024-06-06
Payer: COMMERCIAL

## 2024-06-06 NOTE — OUTREACH NOTE
Maternal Screening Survey      Flowsheet Row Responses   Facility patient discharged from? [facility]   Attempt successful? No   Unsuccessful attempts Attempt 2              Tiff PERSON - Registered Nurse

## 2024-06-06 NOTE — OUTREACH NOTE
Maternal Screening Survey      Flowsheet Row Responses   Facility [facility] discharged from? [facility]   Attempt successful? No   Unsuccessful attempts Attempt 1              Tiff PERSON - Registered Nurse

## 2024-06-07 ENCOUNTER — MATERNAL SCREENING (OUTPATIENT)
Dept: CALL CENTER | Facility: HOSPITAL | Age: 24
End: 2024-06-07
Payer: COMMERCIAL

## 2024-06-07 ENCOUNTER — TELEPHONE (OUTPATIENT)
Dept: OBSTETRICS AND GYNECOLOGY | Facility: CLINIC | Age: 24
End: 2024-06-07
Payer: COMMERCIAL

## 2024-06-07 NOTE — TELEPHONE ENCOUNTER
"Per postpartum visit 6/5/24: \"Describes having had PPD with first pregnancy and medication zoloft made her feel \"not happy, I was just there\".  Has tried therapy as well but feels it is hard to talk to therapist.   Has \"best friend\" that she feels that she can talk to\"    Left voicemail for patient to call back with any questions or desire to initiate any form of treatment. Per MD, she should try Celexa 20 mg instead of Zoloft  "

## 2024-06-07 NOTE — OUTREACH NOTE
Maternal Screening Survey      Flowsheet Row Responses   Facility patient discharged from? [place]   Attempt successful? Yes   Call start time 1037   Call end time 1043   EPD Scale: Able to Laugh 1-->not quite so much now   EPD Scale: Looked Forward 1-->rather less than she used to   EPD Scale: Blamed Self 2-->yes, some of the time   EPD Scale: Been Anxious 2-->yes, sometimes   EPD Scale: Felt Panicky 2-->yes, sometimes   EPD Scale: Things Getting on Top 2-->yes, sometimes hasn't been coping as well as usual   EPD Scale: Difficulty Sleeping 3-->yes, most of the time   EPD Scale: Sad or Miserable 2-->yes, quite often   EPD Scale: Crying 2-->yes, quite often   EPD Scale: Thought of Harming Self 0-->never   Friendly  Depression Score 17   OB Provider Notification Secure Message   Did any of your parents have problems with alcohol or drug use? No   Do any of your peers have problems with alcohol or drug use? No   Does your partner have problems with alcohol or drug use? No   Before you were pregnant did you have problems with alcohol or drug use? (past) No   In the past month, did you drink beer, wine, liquor or use any other drugs? (pregnancy) No   Maternal Screening call completed Yes   Wrap up additional comments Patient aware of high score (17), advised patient that OB would be notified, encouraged patient to reach out to OB office as well, patient verbalized understanding, advised patient to seek urgent care with worsening signs/symptoms, patient verbalized understanding, offered PPD resources through [facility] and patient was accepting, will send information and will notify OB of score.   Call end time 1043              Tiff PERSON - Registered Nurse

## 2024-06-07 NOTE — TELEPHONE ENCOUNTER
----- Message from Kurt Corea sent at 6/7/2024 12:11 PM EDT -----  Regarding: FW: Maternal Screening- High EPDS score  Can you check with Pt and see if wishes to start medication for PPD?  ----- Message -----  From: Tiff Aguilar RN  Sent: 6/7/2024  10:56 AM EDT  To: Kurt Corea MD  Subject: Maternal Screening- High EPDS score              Patient was called as part of our maternal screening call program.  Patient scored a 17 on her EPDS screen.  Patient denied any self harm thoughts.  Advised patient to reach out to OB office and she is aware that OB would be notified.  PPD resources/information was sent to patient via [employer].  Patient states she was on depression/anxiety medication but does not do a good job taking them.  We are required to notify OB of scores greater than 9.  Thanks.

## 2024-06-17 ENCOUNTER — TELEPHONE (OUTPATIENT)
Dept: OBSTETRICS AND GYNECOLOGY | Facility: CLINIC | Age: 24
End: 2024-06-17
Payer: COMMERCIAL

## 2024-06-17 NOTE — TELEPHONE ENCOUNTER
Provider: DR FERNANDEZ     Caller: REJI PEREZ     Relationship to Patient: SELF     Pharmacy: [facility]     Phone Number: [phone]    Reason for Call: PT DELIVERED 5/21/24 - PT STARTED THE BIRTH CONTROL PATCH AND SINCE THEN HAS BEEN BLEEDING VERY HEAVILY - ITS BEEN GOING ON FOR ABOUT 7 DAYS PT IS CHANGING A PAD EVERY 30 MINUTES AND WOULD LIKE TO BE ADVISED SHE IS STILL WEARING THE PATCH AND IS NOT SURE IF SHE NEEDS TO TAKE IT OFF PLEASE CALL

## 2024-06-17 NOTE — TELEPHONE ENCOUNTER
Pt states she only bled a little after her c/s, had stopped by the time she saw him for 2 wk visit. Started patch on 6/5, then started bleeding heavily on 6/11. She is in no pain but the bleeding is heavy, soaking a pad in 30-45 minutes, with large clots. They are quarter sized at most, usually ximena. She states it has been this heavy the entire time. Denies headaches, dizziness, shortness of air. Feels fine. Will speak to Dr. Corea and call pt back.     Per Dr. Corea, will stop the patch for one week and then restart. During this week she can also do ibuprofen 800mg every 8 hours (three times daily) for one week. Make sure to keep 6 wk postpartum appt on 7/3. Pt verbalizes understanding.

## 2024-07-03 ENCOUNTER — POSTPARTUM VISIT (OUTPATIENT)
Dept: OBSTETRICS AND GYNECOLOGY | Facility: CLINIC | Age: 24
End: 2024-07-03
Payer: COMMERCIAL

## 2024-07-03 VITALS
SYSTOLIC BLOOD PRESSURE: 124 MMHG | DIASTOLIC BLOOD PRESSURE: 80 MMHG | BODY MASS INDEX: 25.56 KG/M2 | WEIGHT: 126.8 LBS | HEIGHT: 59 IN

## 2024-07-03 DIAGNOSIS — Z30.011 VISIT FOR ORAL CONTRACEPTIVE PRESCRIPTION: ICD-10-CM

## 2024-07-03 DIAGNOSIS — R87.612 LGSIL ON PAP SMEAR OF CERVIX: ICD-10-CM

## 2024-07-03 DIAGNOSIS — Z11.8 SCREENING FOR CHLAMYDIAL DISEASE: ICD-10-CM

## 2024-07-03 DIAGNOSIS — N89.8 VAGINAL DISCHARGE: ICD-10-CM

## 2024-07-03 DIAGNOSIS — Z12.4 SCREENING FOR CERVICAL CANCER: ICD-10-CM

## 2024-07-03 PROBLEM — D50.9 IRON DEFICIENCY ANEMIA DURING PREGNANCY: Status: RESOLVED | Noted: 2024-03-07 | Resolved: 2024-07-03

## 2024-07-03 PROBLEM — O09.299 HISTORY OF FETAL ANOMALY IN PRIOR PREGNANCY, CURRENTLY PREGNANT: Status: RESOLVED | Noted: 2023-11-08 | Resolved: 2024-07-03

## 2024-07-03 PROBLEM — O99.019 IRON DEFICIENCY ANEMIA DURING PREGNANCY: Status: RESOLVED | Noted: 2024-03-07 | Resolved: 2024-07-03

## 2024-07-03 PROBLEM — O36.60X0 EXCESSIVE FETAL GROWTH AFFECTING MANAGEMENT OF PREGNANCY, ANTEPARTUM: Status: RESOLVED | Noted: 2024-04-24 | Resolved: 2024-07-03

## 2024-07-03 PROBLEM — O34.219 PREVIOUS CESAREAN SECTION COMPLICATING PREGNANCY: Status: RESOLVED | Noted: 2022-06-20 | Resolved: 2024-07-03

## 2024-07-03 PROBLEM — Z34.90 PREGNANCY: Status: RESOLVED | Noted: 2023-11-08 | Resolved: 2024-07-03

## 2024-07-03 RX ORDER — NORETHINDRONE ACETATE AND ETHINYL ESTRADIOL 1MG-20(21)
1 KIT ORAL DAILY
Qty: 28 TABLET | Refills: 12 | Status: SHIPPED | OUTPATIENT
Start: 2024-07-03 | End: 2025-07-03

## 2024-07-03 NOTE — PROGRESS NOTES
Chief Complaint   Patient presents with    Postpartum Care       Postpartum Visit         Kristel Fernandez is a 24 y.o.  who presents today for a 6 week(s) postpartum check.     C/S: repeat and breech     , Low Transverse    Information for the patient's :  Vasquez Mccall [[id number]]   2024   male   Vasquez Mccall   3970 g (8 lb 12 oz)   Gestational Age: 39w3d        Baby Discharged: Discharged with Mom  Delivering Physician: Kurt Corea MD    Her pregnancy was complicated by no known issues. The incision is healing well. Patient describes vaginal bleeding as absent.  Patient is bottle feeding.  She desires her options  for contraception.     She would like to discuss the following complaints today: Would like to discuss bc other than the patch that she has used in the past.  Is somewhat interested in the IUD.    .    Patient reports concerns for postpartum depression/anxiety. Patient reports suicidal or homicidal ideation. Her postpartum depression screening questionnaire: 20.  Reports difficulty with deaths of relatives prior to delivery of . I considering therapy.  Would prefer this over offering medication to help with symptoms.         Last Pap : 23. Results: LSIL. HPV: Not done  Last Completed Pap Smear            Ordered - PAP SMEAR (Every 3 Years) Ordered on 7/3/2024      2023  LIQUID-BASED PAP SMEAR WITH HPV GENOTYPING IF ASCUS (TESSIE,COR,MAD)                      The additional following portions of the patient's history were reviewed and updated as appropriate: allergies, current medications, past family history, past medical history, past social history, past surgical history, and problem list.    Review of Systems   Psychiatric/Behavioral:  Positive for depressed mood.    All other systems reviewed and are negative.    All other systems reviewed and are negative.     I have reviewed and agree with the HPI, ROS, and historical information as  "entered above.   Kurt Corea MD      /80   Ht 149.9 cm (59\")   Wt 57.5 kg (126 lb 12.8 oz)   LMP 2023   Breastfeeding No   BMI 25.61 kg/m²     Physical Exam  Vitals and nursing note reviewed. Exam conducted with a chaperone present.   Constitutional:       Appearance: Normal appearance. She is well-developed and normal weight.   HENT:      Head: Normocephalic and atraumatic.   Pulmonary:      Effort: Pulmonary effort is normal.   Abdominal:      General: A surgical scar is present.      Palpations: Abdomen is soft. Abdomen is not rigid.      Comments: Clean, Dry, and Intact.  No erythema.    Genitourinary:     General: Normal vulva.      Exam position: Lithotomy position.      Vagina: Vaginal discharge and bleeding present. No lesions or prolapsed vaginal walls.      Cervix: No lesion, erythema or cervical bleeding.      Uterus: Not tender and no uterine prolapse.       Adnexa:         Right: No mass, tenderness or fullness.          Left: No mass, tenderness or fullness.        Comments: Yellow discharge present.  Musculoskeletal:      Cervical back: Normal range of motion.   Neurological:      Mental Status: She is alert and oriented to person, place, and time.   Psychiatric:         Mood and Affect: Mood normal.         Behavior: Behavior normal.             Assessment and Plan    Problem List Items Addressed This Visit          Gynecologic and Obstetric Problems    Delivery by  section using transverse incision of lower segment of uterus - Primary    Vaginal discharge    Overview     24; Wet mount positive for BV; Many WBCs  New swab sent.             Other    LGSIL on Pap smear of cervix    Overview     Pap smear 2023 with LGSIL.  Positive for trichomonas.and BV  Pregnant.  Colpo 1/10/2024 with biopsy at 12.  No ECC.  Biopsy with LGSIL.  Needs repeat Pap smear done 6 weeks postpartum.    Pap smear with HPV done 7/3/2024.    Agus Pap in 6 months for stability.         " Relevant Orders    LIQUID-BASED PAP SMEAR WITH HPV GENOTYPING REGARDLESS OF INTERPRETATION (TESSIE,COR,MAD)    Screening for cervical cancer    Overview     2023 Pap with HPV done.  Pap smear 2023 with LGSIL.  Positive for trichomonas.and BV     Colpo 1/10/2024 with biopsy at 12.  No ECC.  Biopsy with LGSIL.  Repeat Pap smear 6 weeks postpartum.  Pap smear with HPV done 7/3/2024.    Agus Pap in 6 months for stability.         Visit for oral contraceptive prescription    Overview     7/3/2024.  Patient states that she did not like her birth control patch.  Rx for Loestrin  sent to pharmacy.  May be interested in Nexplanon or Mirena IUD.          Other Visit Diagnoses       Postpartum follow-up        Screening for chlamydial disease                S/p , 6 week(s) postpartum.  Doing well.    Return to normal physical activity.  No pelvic restrictions.   Pap smear with LGSIL 2023.  Colpo 1/10/2024 with LGSIL.  Repeat Pap smear done today.  Repeat Pap smear in 6 months for spontaneous resolution.  Baby doing well.  Bottlefeeding going well.  No si/sx of postpartum depression  Contraception: contraceptive methods: Currently on Ortho Evra patch.  Does not like patch.  Wishes to switch to oral OCP.  Rx for Junel  sent to pharmacy.  Information given on Mirena IUD and Nexplanon.  Vaginal discharge.  DNA testing sent on Pap smear for BV, trichomonas, GC and chlamydia.  Return in about 6 months (around 1/3/2025) for Follow-up Pap smear.     Kurt Corea MD  2024

## 2024-07-11 ENCOUNTER — TELEPHONE (OUTPATIENT)
Dept: OBSTETRICS AND GYNECOLOGY | Facility: CLINIC | Age: 24
End: 2024-07-11
Payer: COMMERCIAL

## 2024-07-11 LAB — REF LAB TEST METHOD: NORMAL

## 2024-07-11 RX ORDER — FLUCONAZOLE 150 MG/1
150 TABLET ORAL AS NEEDED
Qty: 2 TABLET | Refills: 0 | Status: SHIPPED | OUTPATIENT
Start: 2024-07-11

## 2024-07-11 RX ORDER — METRONIDAZOLE 500 MG/1
500 TABLET ORAL 2 TIMES DAILY
Qty: 14 TABLET | Refills: 0 | Status: SHIPPED | OUTPATIENT
Start: 2024-07-11 | End: 2024-07-18

## 2024-07-11 NOTE — TELEPHONE ENCOUNTER
"Returned patient's call.   Informed her of provider's pap result note: \"ASCUS HPV pool positive. Repeat pap in one year. Also positive for BV and trichomonas. Will send flagyl and as needed diflucan. Partner needs treated and test of cure 1 month.\"  Discussed ASCUS & HPV. Advised not to drink ETOH while taking Flagyl; no intercourse until self and partner have completed treatment; no unprotected intercourse until after visit for PRIYANKA. She v/u and agreed. Follow up visit scheduled.   "

## 2024-07-23 ENCOUNTER — TELEPHONE (OUTPATIENT)
Dept: OBSTETRICS AND GYNECOLOGY | Facility: CLINIC | Age: 24
End: 2024-07-23
Payer: COMMERCIAL

## 2024-07-23 PROBLEM — R87.810 CERVICAL HIGH RISK HPV (HUMAN PAPILLOMAVIRUS) TEST POSITIVE: Status: ACTIVE | Noted: 2024-07-23

## 2024-07-23 NOTE — TELEPHONE ENCOUNTER
GC chlamydia screen negative.  BV test was positive.  Rx for clindamycin vaginal cream 1 applicator PV nightly x 7 nights.  Rx sent to pharmacy.   needed assist

## 2024-07-24 ENCOUNTER — TELEPHONE (OUTPATIENT)
Dept: OBSTETRICS AND GYNECOLOGY | Facility: CLINIC | Age: 24
End: 2024-07-24
Payer: COMMERCIAL

## 2024-07-24 NOTE — TELEPHONE ENCOUNTER
Prior Auth received from [facility] for Clindamycin 2% Vaginal cream. Approved today. The authorization is effective for a maximum of 1 fills from 07/24/2024 to 08/23/2024, as long as the member is enrolled in their current health plan. A written notification letter will follow with additional details.  Authorization Expiration Date: 8/22/2024

## 2024-07-30 ENCOUNTER — TELEPHONE (OUTPATIENT)
Dept: OBSTETRICS AND GYNECOLOGY | Facility: CLINIC | Age: 24
End: 2024-07-30
Payer: COMMERCIAL

## 2024-07-30 NOTE — TELEPHONE ENCOUNTER
The Dr. Cristobal Stevens, Pediatric Urology [phone], her daughters apt is tomorrow at 12:30 [address]. I told her the babies last name is [place] under the apt not [place] and the phone number is different than what we have on file.

## 2024-07-30 NOTE — TELEPHONE ENCOUNTER
Pt called wanting to know the name of dr/ for her baby's conditions/ I looked thru the notes I did not see the dr's name that dr tay suggested

## 2024-09-06 ENCOUNTER — OFFICE VISIT (OUTPATIENT)
Dept: OBSTETRICS AND GYNECOLOGY | Facility: CLINIC | Age: 24
End: 2024-09-06
Payer: COMMERCIAL

## 2024-09-06 VITALS
WEIGHT: 123.6 LBS | BODY MASS INDEX: 24.92 KG/M2 | DIASTOLIC BLOOD PRESSURE: 80 MMHG | HEIGHT: 59 IN | SYSTOLIC BLOOD PRESSURE: 110 MMHG

## 2024-09-06 DIAGNOSIS — Z30.011 VISIT FOR ORAL CONTRACEPTIVE PRESCRIPTION: ICD-10-CM

## 2024-09-06 DIAGNOSIS — N76.0 BACTERIAL VAGINOSIS: ICD-10-CM

## 2024-09-06 DIAGNOSIS — N76.0 BACTERIAL VAGINITIS: ICD-10-CM

## 2024-09-06 DIAGNOSIS — Z86.19 HISTORY OF TRICHOMONIASIS: Primary | ICD-10-CM

## 2024-09-06 DIAGNOSIS — B96.89 BACTERIAL VAGINITIS: ICD-10-CM

## 2024-09-06 DIAGNOSIS — B96.89 BACTERIAL VAGINOSIS: ICD-10-CM

## 2024-09-06 DIAGNOSIS — R87.612 LGSIL ON PAP SMEAR OF CERVIX: ICD-10-CM

## 2024-09-06 PROCEDURE — 99213 OFFICE O/P EST LOW 20 MIN: CPT | Performed by: OBSTETRICS & GYNECOLOGY

## 2024-09-06 RX ORDER — IBUPROFEN 200 MG
200 TABLET ORAL EVERY 6 HOURS PRN
COMMUNITY

## 2024-09-06 NOTE — PROGRESS NOTES
"            Chief Complaint   Patient presents with    Follow-up     Test of cure         Subjective   HPI  Kristel Fernandez is a 24 y.o. female, , who presents for test of cure trichomonas and BV.     Patient's last menstrual period was 2024 (exact date). At the patient's 7/3 postpartum visit, she had a pap smear that resulted positive for trichomonas and BV. She completed her antibiotics \"a few weeks ago\". The patient reports vaginal discharge.  She describes this as brown . These symptoms have been intermittent since her delivery; informed patient that menstrual bleeding can take up to 6 months before regulating, post-delivery. The patient reports a past history of: gonorrhea, trichomonas, and BV.      Additional OB/GYN History   Last Pap :   Last Completed Pap Smear            PAP SMEAR (Every 6 Months) Next due on 1/3/2025      2024  LIQUID-BASED PAP SMEAR WITH HPV GENOTYPING REGARDLESS OF INTERPRETATION (TESSIE,COR,MAD)    2023  LIQUID-BASED PAP SMEAR WITH HPV GENOTYPING IF ASCUS (TESSIE,COR,MAD)                  History of abnormal Pap smear: yes - per MD, 6 month repeat pap smear  OB History          2    Para   2    Term   2       0    AB   0    Living   2         SAB   0    IAB   0    Ectopic   0    Molar   0    Multiple   0    Live Births   2          Obstetric Comments   Fob#1 pregnancy #1  Fob#2 pregnancy#2    G1: previous C/S at 39 weeks, breech, 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  Rx amoxicillin.   Went to urgent treatment center and treated with Macrobid and 10 on 2021. "  Can discontinue Macrodantin    Anxiety     Depression     Excessive fetal growth affecting management of pregnancy, antepartum 2024; PDC ultrasound normal with presentation.  EFW 72%.   AC 84%.  2024; Breech presentation. 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  U/s 22; right dysplastic kidney measures 8/x5x6 cm. LOLI 7.3  Repeat BPP weekly. Continue oral hydration.     GERD without esophagitis 2022    Rx Pepcid 20 mg po bid.     HPV (human papilloma virus) infection     LGSIL on Pap smear of cervix     Post partum depression         Past Surgical History:   Procedure Laterality Date    APPENDECTOMY       SECTION N/A 2022    Procedure:  SECTION PRIMARY BREECH;  Surgeon: Kurt Corea MD;  Location:  [facility]IV[facility];  Service: Obstetrics/Gynecology;  Laterality: N/A;     SECTION N/A 2024    Procedure:  SECTION REPEAT;  Surgeon: Kurt Corea MD;  Location:  [facility]IV[facility];  Service: Obstetrics/Gynecology;  Laterality: N/A;    COLPOSCOPY      EAR TUBES         The additional following portions of the patient's history were reviewed and updated as appropriate: allergies, current medications, past family history, past medical history, past social history, past surgical history, and problem list.    Review of Systems   Constitutional: Negative.    HENT: Negative.     Eyes: Negative.    Respiratory: Negative.     Cardiovascular: Negative.    Gastrointestinal: Negative.    Endocrine: Negative.    Genitourinary:  Positive for menstrual  "problem.   Musculoskeletal: Negative.    Skin: Negative.    Allergic/Immunologic: Negative.    Neurological: Negative.    Hematological: Negative.    Psychiatric/Behavioral: Negative.       All other systems reviewed and are negative.     I have reviewed and agree with the HPI, ROS, and historical information as entered above.   Kurt Corea MD      Objective   /80 (BP Location: Right arm, Patient Position: Sitting, Cuff Size: Adult)   Ht 149.9 cm (59\")   Wt 56.1 kg (123 lb 9.6 oz)   LMP 08/19/2024 (Exact Date)   BMI 24.96 kg/m²     Physical Exam  Vitals and nursing note reviewed. Exam conducted with a chaperone present.   Constitutional:       Appearance: Normal appearance.   HENT:      Head: Normocephalic and atraumatic.   Pulmonary:      Effort: Pulmonary effort is normal.   Genitourinary:     General: Normal vulva.      Exam position: Lithotomy position.      Vagina: No foreign body. Bleeding present.      Comments: Dark brown menstrual vaginal discharge present.  Neurological:      Mental Status: She is alert and oriented to person, place, and time.   Psychiatric:         Behavior: Behavior normal.           Assessment & Plan     Assessment and Plan    Problem List Items Addressed This Visit          Gynecologic and Obstetric Problems    Bacterial vaginitis    Overview     1/10/2024 vaginal discharge was consistent with bacterial vaginosis.  May be typical BV and resistant to Flagyl.  Rx clindamycin vaginal cream 1 applicator PV nightly for 7 nights.    GC, chlamydia, and trichomonas were negative.    Pap smear 7/3/2024 with Atypical cells; HPV high risk Pool positive.  HPV 16/18/45 negative.  History of LGSIL.  Stable to improved.  Can repeat in 6 months.     GC chlamydia screen negative.  BV test was positive.  Trichomonas positive.  Rx for clindamycin vaginal cream 1 applicator PV nightly x 7 nights.    9/6/2024 comes in for test of cure for trichomonas and BV.            Other    LGSIL on " Pap smear of cervix    Overview     Pap smear 11/8/2023 with LGSIL.  Positive for trichomonas.and BV  Pregnant.  Colpo 1/10/2024 with biopsy at 12.  No ECC.  Biopsy with LGSIL.  Needs repeat Pap smear done 6 weeks postpartum.    Pap smear 7/3/2024 with Atypical cells; HPV high risk Pool positive.  HPV 16/18/45 negative.  History of LGSIL.  Stable to improved.    Repeat Pap in 6 months for stability.         Visit for oral contraceptive prescription    Overview     7/3/2024.  Patient states that she did not like her birth control patch.  Rx for Loestrin 1/20 sent to pharmacy.  May be interested in Nexplanon or Mirena IUD.          Other Visit Diagnoses       History of trichomoniasis    -  Primary    Bacterial vaginosis                Test of cure for BV and trichomonas.  Breakthrough bleeding on second cycle of Junel 1/20.  Call if does not improve to switch to stronger OCP.  Could try Marcella 1.5/30.  Pap smear 11/8/2023 with LGSIL.  Following with 6-month Pap smears for resolution.  Pap smear 7/3/2024 with atypical cells.  HPV high risk Pool positive.  HPV 16/18/45 negative.  Has 6-month repeat Pap smear scheduled.  Continue with 6-month Pap smears until 2 negative Pap smears.        Kurt Corea MD  09/06/2024

## 2024-09-10 LAB
A VAGINAE DNA VAG QL NAA+PROBE: NORMAL SCORE
BVAB2 DNA VAG QL NAA+PROBE: NORMAL SCORE
C TRACH RRNA SPEC QL NAA+PROBE: NEGATIVE
MEGA1 DNA VAG QL NAA+PROBE: NORMAL SCORE
N GONORRHOEA RRNA SPEC QL NAA+PROBE: NEGATIVE
T VAGINALIS RRNA SPEC QL NAA+PROBE: NEGATIVE

## 2024-09-23 ENCOUNTER — TELEPHONE (OUTPATIENT)
Dept: OBSTETRICS AND GYNECOLOGY | Facility: CLINIC | Age: 24
End: 2024-09-23
Payer: COMMERCIAL

## 2024-12-06 ENCOUNTER — OFFICE VISIT (OUTPATIENT)
Dept: OBSTETRICS AND GYNECOLOGY | Facility: CLINIC | Age: 24
End: 2024-12-06
Payer: COMMERCIAL

## 2024-12-06 VITALS
SYSTOLIC BLOOD PRESSURE: 118 MMHG | HEIGHT: 59 IN | WEIGHT: 128 LBS | BODY MASS INDEX: 25.8 KG/M2 | DIASTOLIC BLOOD PRESSURE: 70 MMHG

## 2024-12-06 DIAGNOSIS — Z01.419 PAP TEST, AS PART OF ROUTINE GYNECOLOGICAL EXAMINATION: Primary | ICD-10-CM

## 2024-12-06 DIAGNOSIS — R87.612 LGSIL ON PAP SMEAR OF CERVIX: ICD-10-CM

## 2024-12-06 DIAGNOSIS — Z30.011 VISIT FOR ORAL CONTRACEPTIVE PRESCRIPTION: ICD-10-CM

## 2024-12-06 DIAGNOSIS — R87.810 CERVICAL HIGH RISK HPV (HUMAN PAPILLOMAVIRUS) TEST POSITIVE: ICD-10-CM

## 2024-12-06 DIAGNOSIS — Z12.4 SCREENING FOR CERVICAL CANCER: ICD-10-CM

## 2024-12-06 NOTE — PROGRESS NOTES
Gynecologic Annual Exam Note        Gynecologic Exam        Subjective     HPI  Kristel Fernandez is a 24 y.o.  female who presents for annual well woman exam as a established patient. There were no changes to her medical or surgical history since her last visit.. Patient's last menstrual period was 2024 (approximate). Her periods occur every month, lasting 5-7 days.  The flow is heavy. She reports dysmenorrhea is severe occurring first 1-2 days of flow. Marital Status: single.  She is not currently sexually active. STD testing recommendations have been explained to the patient and she does desire STD testing.    The patient would like to discuss the following complaints today: Pt states that she saw kidney disease on her medical hx for one of her pregnancy and was unaware of this diagnosis. Reports BTB this past cycle and cramping. States that she did not miss any doses of her OCP.     Additional OB/GYN History   contraceptive methods: OCP (estrogen/progesterone)  Desires to: continue contraception  Thromboembolic Disease: none  History of migraines: yes without aura      History of STD: yes  trichomonas    Last Pap : 24. Results: ASCUS. HPV: HPV pool +.   Last Completed Pap Smear            Ordered - PAP SMEAR (Every 6 Months) Ordered on 2024  LIQUID-BASED PAP SMEAR WITH HPV GENOTYPING REGARDLESS OF INTERPRETATION ([facility])    2023  LIQUID-BASED PAP SMEAR WITH HPV GENOTYPING IF ASCUS ([facility])                     History of abnormal Pap smear: yes - hx colpo  Gardasil status:completed  Family history of uterine, colon, breast, or ovarian cancer: no  Performs monthly Self-Breast Exam: no  Exercises Regularly:no  Feelings of Anxiety or Depression: yes - anxiety  Tobacco Usage?: No       Current Outpatient Medications:     ibuprofen (ADVIL,MOTRIN) 200 MG tablet, Take 1 tablet by mouth Every 6 (Six) Hours As Needed for Mild Pain., Disp: , Rfl:      norethindrone-ethinyl estradiol FE (Junel FE 1/20) 1-20 MG-MCG per tablet, Take 1 tablet by mouth Daily., Disp: 28 tablet, Rfl: 12     Patient is requesting refills of OCP.    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  Rx amoxicillin.   Went to urgent treatment center and treated with Macrobid and 10 on 2021.  Can discontinue Macrodantin    Anxiety     Depression     Excessive fetal growth affecting management of pregnancy, antepartum 2024; [facility] ultrasound normal with presentation.  EFW 72%.   AC 84%.  2024; Breech presentation. 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  U/s 22; right dysplastic kidney measures 8/x5x6 cm. LOLI 7.3   "Repeat BPP weekly. Continue oral hydration.     GERD without esophagitis 2022    Rx Pepcid 20 mg po bid.     HPV (human papilloma virus) infection     LGSIL on Pap smear of cervix     Post partum depression         Past Surgical History:   Procedure Laterality Date    APPENDECTOMY       SECTION N/A 2022    Procedure:  SECTION PRIMARY BREECH;  Surgeon: Kurt Corea MD;  [facility];  Service: Obstetrics/Gynecology;  Laterality: N/A;     SECTION N/A 2024    Procedure:  SECTION REPEAT;  Surgeon: Kurt Corea MD;  [facility];  Service: Obstetrics/Gynecology;  Laterality: N/A;    COLPOSCOPY      EAR TUBES         The additional following portions of the patient's history were reviewed and updated as appropriate: allergies, current medications, past family history, past medical history, past social history, past surgical history, and problem list.    Review of Systems   Constitutional: Negative.    HENT: Negative.     Eyes: Negative.    Respiratory: Negative.     Cardiovascular: Negative.    Gastrointestinal: Negative.    Endocrine: Negative.    Genitourinary: Negative.    Musculoskeletal: Negative.    Skin: Negative.    Allergic/Immunologic: Negative.    Neurological: Negative.    Hematological: Negative.    Psychiatric/Behavioral: Negative.           I have reviewed and agree with the HPI, ROS, and historical information as entered above.   Kurt Corea MD          Objective   /70   Ht 149.9 cm (59\")   Wt 58.1 kg (128 lb)   LMP 2024 (Approximate)   Breastfeeding No   BMI 25.85 kg/m²     Physical Exam  Vitals and nursing note reviewed. Exam conducted with a chaperone present.   Constitutional:       Appearance: Normal appearance. She is well-developed and normal weight.   HENT:      Head: Normocephalic and atraumatic.   Neck:      Thyroid: No thyroid mass or thyromegaly.   Cardiovascular: "      Heart sounds: Murmur heard.   Pulmonary:      Effort: Pulmonary effort is normal. No retractions.   Chest:      Chest wall: No mass.   Breasts:     Right: Normal. No mass, nipple discharge, skin change or tenderness.      Left: Normal. No mass, nipple discharge, skin change or tenderness.   Abdominal:      General: Bowel sounds are normal.      Palpations: Abdomen is soft. Abdomen is not rigid. There is no mass.      Tenderness: There is no abdominal tenderness. There is no guarding.      Hernia: No hernia is present. There is no hernia in the left inguinal area.   Genitourinary:     General: Normal vulva.      Exam position: Lithotomy position.      Labia:         Right: No rash, tenderness or lesion.         Left: No rash, tenderness or lesion.       Vagina: Normal. No vaginal discharge, bleeding or lesions.      Cervix: No cervical motion tenderness, discharge, lesion or cervical bleeding.      Uterus: Normal. Not enlarged, not fixed and not tender.       Adnexa:         Right: No mass or tenderness.          Left: No mass or tenderness.        Rectum: No external hemorrhoid.   Musculoskeletal:      Cervical back: Normal range of motion. No muscular tenderness.   Neurological:      Mental Status: She is alert and oriented to person, place, and time.   Psychiatric:         Behavior: Behavior normal.            Assessment and Plan    Problem List Items Addressed This Visit       Cervical high risk HPV (human papillomavirus) test positive    Overview     Pap smear 7/3/2024 with Atypical cells; HPV high risk Pool positive.  HPV 16/18/45 negative.           LGSIL on Pap smear of cervix    Overview     Pap smear 11/8/2023 with LGSIL.  Positive for trichomonas.and BV  Pregnant.  Colpo 1/10/2024 with biopsy at 12.  No ECC.  Biopsy with LGSIL.  Needs repeat Pap smear done 6 weeks postpartum.    Pap smear 7/3/2024 with Atypical cells; HPV high risk Pool positive.  HPV 16/18/45 negative.  History of LGSIL.  Stable to  improved.  Repeat Pap in 6 months for stability.    Pap smear 12/6/2024 with HPV screen         Screening for cervical cancer    Overview     11/8/2023 Pap with HPV done.  Pap smear 11/8/2023 with LGSIL.  Positive for trichomonas.and BV     Colpo 1/10/2024 with biopsy at 12.  No ECC.  Biopsy with LGSIL.  Repeat Pap smear 6 weeks postpartum.  Pap smear with HPV done 7/3/2024 ASCUS; high risk HPV pool positive.  HPV 16/18/45 negative.  Plan to repeat Pap in 6 months for stability.    Pap smear 12/6/2024 with HPV screen  Repeat Pap in 6 months.         Visit for oral contraceptive prescription    Overview     7/3/2024.  Patient states that she did not like her birth control patch.  Rx for Loestrin 1/20 sent to pharmacy.            Other Visit Diagnoses       Pap test, as part of routine gynecological examination    -  Primary    Relevant Orders    LIQUID-BASED PAP SMEAR WITH HPV GENOTYPING REGARDLESS OF INTERPRETATION ([facility])            GYN annual well woman exam.  24 years of age.  Tolerating Junel 1/20 well.  Menses are regular.  Mild dysplasia on colposcopic biopsy 1/10/2024.  Following with Pap smears for spontaneous resolution.  Last Pap smear 7/3/2024 with atypical cells and HPV high risk Pool positive.  HPV 16/18/45 negative.  Reviewed pap guidelines.  Continue with Pap smears every 6 months until 2 negative Pap smears.  OCP's/Vaginal Ring - Discussed side effects of nausea, BTB, headaches, breast tenderness and slight weight gain in the first three cycles.  Understands risks of blood clots, stroke, and theoretical risk of breast cancer.  Denies family history of blood clots.  Reviewed monthly self breast exams.  Instructed to call with lumps, pain, or breast discharge.    Reviewed exercise as a preventative health measures.   Reccommended Flu Vaccine in Fall of each year.  Return in about 6 months (around 6/6/2025) for Annual physical, Follow-up Pap smear.    Kurt Corea MD  12/06/2024

## 2024-12-12 LAB — REF LAB TEST METHOD: NORMAL

## 2024-12-13 ENCOUNTER — TELEPHONE (OUTPATIENT)
Dept: OBSTETRICS AND GYNECOLOGY | Facility: CLINIC | Age: 24
End: 2024-12-13
Payer: COMMERCIAL

## 2024-12-13 RX ORDER — FLUCONAZOLE 150 MG/1
150 TABLET ORAL ONCE
Qty: 1 TABLET | Refills: 0 | Status: SHIPPED | OUTPATIENT
Start: 2024-12-13 | End: 2024-12-13

## 2024-12-13 NOTE — TELEPHONE ENCOUNTER
Pap smear 12/6/2024 was negative.  HPV high risk Pool negative.  Repeat Pap smear in 6 months as planned.    Yeast seen on Pap smear.  Rx for Diflucan 150 mg sent to pharmacy.

## (undated) DEVICE — SUT GUT CHRM 1 CTX 36IN 905H

## (undated) DEVICE — PK C/SECT 10

## (undated) DEVICE — APPL CHLORAPREP TINTED 26ML TEAL

## (undated) DEVICE — CLTH CLENS READYCLEANSE PERI CARE PK/5

## (undated) DEVICE — MAT PREVALON MOBL TRANSFR AIR W/PAD REPROC 39X81IN

## (undated) DEVICE — SOL IRR NACL 0.9PCT BT 1000ML

## (undated) DEVICE — SUT VIC 2/0 CT1 27IN J339H BX/36

## (undated) DEVICE — STPLR SKIN SUBCUTICULAR INSORB 2030

## (undated) DEVICE — PATIENT RETURN ELECTRODE, SINGLE-USE, CONTACT QUALITY MONITORING, ADULT, WITH 9FT CORD, FOR PATIENTS WEIGING OVER 33LBS. (15KG): Brand: MEGADYNE

## (undated) DEVICE — GLV SURG BIOGEL LTX PF 7 1/2

## (undated) DEVICE — TBG PENCL TELESCP MEGADYNE SMOKE EVAC 10FT

## (undated) DEVICE — PENCL SMOKE/EVAC MEGADYNE TELESCP 10FT

## (undated) DEVICE — COATED VICRYL  (POLYGLACTIN 910) SUTURE, VIOLET BRAIDED, STERILE, SYNTHETIC ABSORBABLE SUTURE: Brand: COATED VICRYL

## (undated) DEVICE — BOWL UTIL STRL 32OZ

## (undated) DEVICE — TRY SPINE BLCK WHITACRE 25G 3X5IN

## (undated) DEVICE — SUT PLAIN  3/0 CT1 27IN 842H

## (undated) DEVICE — TRAP FLD MINIVAC MEGADYNE 100ML

## (undated) DEVICE — PROXIMATE RH ROTATING HEAD SKIN STAPLERS (35 WIDE) CONTAINS 35 STAINLESS STEEL STAPLES: Brand: PROXIMATE

## (undated) DEVICE — 4-PORT MANIFOLD: Brand: NEPTUNE 2

## (undated) DEVICE — SOL IRR H2O BTL 1000ML STRL